# Patient Record
Sex: FEMALE | Race: WHITE | NOT HISPANIC OR LATINO | Employment: OTHER | URBAN - METROPOLITAN AREA
[De-identification: names, ages, dates, MRNs, and addresses within clinical notes are randomized per-mention and may not be internally consistent; named-entity substitution may affect disease eponyms.]

---

## 2017-06-07 ENCOUNTER — APPOINTMENT (EMERGENCY)
Dept: CT IMAGING | Facility: HOSPITAL | Age: 37
End: 2017-06-07
Payer: COMMERCIAL

## 2017-06-07 ENCOUNTER — HOSPITAL ENCOUNTER (EMERGENCY)
Facility: HOSPITAL | Age: 37
Discharge: HOME | End: 2017-06-07
Attending: EMERGENCY MEDICINE | Admitting: EMERGENCY MEDICINE
Payer: COMMERCIAL

## 2017-06-07 VITALS
DIASTOLIC BLOOD PRESSURE: 71 MMHG | HEART RATE: 55 BPM | SYSTOLIC BLOOD PRESSURE: 112 MMHG | TEMPERATURE: 97.8 F | WEIGHT: 143.08 LBS | RESPIRATION RATE: 20 BRPM | OXYGEN SATURATION: 100 %

## 2017-06-07 DIAGNOSIS — K52.9 GASTROENTERITIS: ICD-10-CM

## 2017-06-07 DIAGNOSIS — R10.9 NONSPECIFIC ABDOMINAL PAIN: Primary | ICD-10-CM

## 2017-06-07 LAB
ALBUMIN SERPL BCP-MCNC: 4 G/DL (ref 3.5–5)
ALP SERPL-CCNC: 74 U/L (ref 46–116)
ALT SERPL W P-5'-P-CCNC: 15 U/L (ref 12–78)
ANION GAP SERPL CALCULATED.3IONS-SCNC: 7 MMOL/L (ref 4–13)
AST SERPL W P-5'-P-CCNC: 16 U/L (ref 5–45)
BASOPHILS # BLD AUTO: 0.02 THOUSANDS/ΜL (ref 0–0.1)
BASOPHILS NFR BLD AUTO: 0 % (ref 0–1)
BILIRUB DIRECT SERPL-MCNC: 0.08 MG/DL (ref 0–0.2)
BILIRUB SERPL-MCNC: 0.4 MG/DL (ref 0.2–1)
BILIRUB UR QL STRIP: NEGATIVE
BUN SERPL-MCNC: 10 MG/DL (ref 5–25)
CALCIUM SERPL-MCNC: 8.8 MG/DL (ref 8.3–10.1)
CHLORIDE SERPL-SCNC: 106 MMOL/L (ref 100–108)
CLARITY UR: CLEAR
CO2 SERPL-SCNC: 28 MMOL/L (ref 21–32)
COLOR UR: YELLOW
CREAT SERPL-MCNC: 0.66 MG/DL (ref 0.6–1.3)
EOSINOPHIL # BLD AUTO: 0.05 THOUSAND/ΜL (ref 0–0.61)
EOSINOPHIL NFR BLD AUTO: 1 % (ref 0–6)
ERYTHROCYTE [DISTWIDTH] IN BLOOD BY AUTOMATED COUNT: 12.8 % (ref 11.6–15.1)
GFR SERPL CREATININE-BSD FRML MDRD: >60 ML/MIN/1.73SQ M
GLUCOSE SERPL-MCNC: 99 MG/DL (ref 65–140)
GLUCOSE UR STRIP-MCNC: NEGATIVE MG/DL
HCT VFR BLD AUTO: 41.3 % (ref 34.8–46.1)
HGB BLD-MCNC: 13.9 G/DL (ref 11.5–15.4)
HGB UR QL STRIP.AUTO: NEGATIVE
KETONES UR STRIP-MCNC: NEGATIVE MG/DL
LEUKOCYTE ESTERASE UR QL STRIP: NEGATIVE
LIPASE SERPL-CCNC: 129 U/L (ref 73–393)
LYMPHOCYTES # BLD AUTO: 1.3 THOUSANDS/ΜL (ref 0.6–4.47)
LYMPHOCYTES NFR BLD AUTO: 14 % (ref 14–44)
MCH RBC QN AUTO: 30 PG (ref 26.8–34.3)
MCHC RBC AUTO-ENTMCNC: 33.7 G/DL (ref 31.4–37.4)
MCV RBC AUTO: 89 FL (ref 82–98)
MONOCYTES # BLD AUTO: 0.35 THOUSAND/ΜL (ref 0.17–1.22)
MONOCYTES NFR BLD AUTO: 4 % (ref 4–12)
NEUTROPHILS # BLD AUTO: 7.58 THOUSANDS/ΜL (ref 1.85–7.62)
NEUTS SEG NFR BLD AUTO: 81 % (ref 43–75)
NITRITE UR QL STRIP: NEGATIVE
PH UR STRIP.AUTO: 7.5 [PH] (ref 4.5–8)
PLATELET # BLD AUTO: 286 THOUSANDS/UL (ref 149–390)
PMV BLD AUTO: 9.1 FL (ref 8.9–12.7)
POTASSIUM SERPL-SCNC: 3.8 MMOL/L (ref 3.5–5.3)
PROT SERPL-MCNC: 7.5 G/DL (ref 6.4–8.2)
PROT UR STRIP-MCNC: NEGATIVE MG/DL
RBC # BLD AUTO: 4.63 MILLION/UL (ref 3.81–5.12)
SODIUM SERPL-SCNC: 141 MMOL/L (ref 136–145)
SP GR UR STRIP.AUTO: 1.01 (ref 1–1.03)
UROBILINOGEN UR QL STRIP.AUTO: 0.2 E.U./DL
WBC # BLD AUTO: 9.3 THOUSAND/UL (ref 4.31–10.16)

## 2017-06-07 PROCEDURE — 96375 TX/PRO/DX INJ NEW DRUG ADDON: CPT

## 2017-06-07 PROCEDURE — 99284 EMERGENCY DEPT VISIT MOD MDM: CPT

## 2017-06-07 PROCEDURE — 80076 HEPATIC FUNCTION PANEL: CPT | Performed by: PHYSICIAN ASSISTANT

## 2017-06-07 PROCEDURE — 96361 HYDRATE IV INFUSION ADD-ON: CPT

## 2017-06-07 PROCEDURE — 83690 ASSAY OF LIPASE: CPT | Performed by: PHYSICIAN ASSISTANT

## 2017-06-07 PROCEDURE — 36415 COLL VENOUS BLD VENIPUNCTURE: CPT | Performed by: PHYSICIAN ASSISTANT

## 2017-06-07 PROCEDURE — 80048 BASIC METABOLIC PNL TOTAL CA: CPT | Performed by: PHYSICIAN ASSISTANT

## 2017-06-07 PROCEDURE — A9270 NON-COVERED ITEM OR SERVICE: HCPCS | Performed by: PHYSICIAN ASSISTANT

## 2017-06-07 PROCEDURE — 96374 THER/PROPH/DIAG INJ IV PUSH: CPT

## 2017-06-07 PROCEDURE — 74177 CT ABD & PELVIS W/CONTRAST: CPT

## 2017-06-07 PROCEDURE — 85025 COMPLETE CBC W/AUTO DIFF WBC: CPT | Performed by: PHYSICIAN ASSISTANT

## 2017-06-07 PROCEDURE — 81003 URINALYSIS AUTO W/O SCOPE: CPT | Performed by: PHYSICIAN ASSISTANT

## 2017-06-07 RX ORDER — DICYCLOMINE HCL 20 MG
20 TABLET ORAL EVERY 6 HOURS
Qty: 20 TABLET | Refills: 0 | Status: SHIPPED | OUTPATIENT
Start: 2017-06-07 | End: 2018-08-07 | Stop reason: ALTCHOICE

## 2017-06-07 RX ORDER — DICYCLOMINE HCL 20 MG
20 TABLET ORAL ONCE
Status: COMPLETED | OUTPATIENT
Start: 2017-06-07 | End: 2017-06-07

## 2017-06-07 RX ORDER — MORPHINE SULFATE 2 MG/ML
2 INJECTION, SOLUTION INTRAMUSCULAR; INTRAVENOUS ONCE
Status: COMPLETED | OUTPATIENT
Start: 2017-06-07 | End: 2017-06-07

## 2017-06-07 RX ORDER — ONDANSETRON 2 MG/ML
4 INJECTION INTRAMUSCULAR; INTRAVENOUS ONCE
Status: COMPLETED | OUTPATIENT
Start: 2017-06-07 | End: 2017-06-07

## 2017-06-07 RX ORDER — ONDANSETRON 4 MG/1
4 TABLET, ORALLY DISINTEGRATING ORAL EVERY 8 HOURS PRN
Qty: 15 TABLET | Refills: 0 | Status: SHIPPED | OUTPATIENT
Start: 2017-06-07 | End: 2018-07-13 | Stop reason: ALTCHOICE

## 2017-06-07 RX ORDER — SUCRALFATE ORAL 1 G/10ML
1000 SUSPENSION ORAL ONCE
Status: COMPLETED | OUTPATIENT
Start: 2017-06-07 | End: 2017-06-07

## 2017-06-07 RX ADMIN — SUCRALFATE 1000 MG: 1 SUSPENSION ORAL at 12:53

## 2017-06-07 RX ADMIN — ONDANSETRON 4 MG: 2 INJECTION INTRAMUSCULAR; INTRAVENOUS at 12:24

## 2017-06-07 RX ADMIN — MORPHINE SULFATE 2 MG: 2 INJECTION, SOLUTION INTRAMUSCULAR; INTRAVENOUS at 14:03

## 2017-06-07 RX ADMIN — IOHEXOL 100 ML: 350 INJECTION, SOLUTION INTRAVENOUS at 13:05

## 2017-06-07 RX ADMIN — LIDOCAINE HYDROCHLORIDE 15 ML: 20 SOLUTION ORAL; TOPICAL at 12:54

## 2017-06-07 RX ADMIN — SODIUM CHLORIDE 1000 ML: 0.9 INJECTION, SOLUTION INTRAVENOUS at 12:17

## 2017-06-07 RX ADMIN — DICYCLOMINE HYDROCHLORIDE 20 MG: 20 TABLET ORAL at 12:24

## 2018-01-24 DIAGNOSIS — F41.9 ANXIETY: Primary | ICD-10-CM

## 2018-01-24 RX ORDER — ALBUTEROL SULFATE 90 UG/1
AEROSOL, METERED RESPIRATORY (INHALATION)
COMMUNITY
Start: 2014-04-07 | End: 2019-08-27 | Stop reason: ALTCHOICE

## 2018-01-24 RX ORDER — CITALOPRAM 40 MG/1
TABLET ORAL
COMMUNITY
Start: 2017-03-05 | End: 2018-08-01 | Stop reason: ALTCHOICE

## 2018-01-24 RX ORDER — ALPRAZOLAM 0.5 MG/1
TABLET ORAL
COMMUNITY
Start: 2017-05-08 | End: 2018-01-24 | Stop reason: SDUPTHER

## 2018-01-24 RX ORDER — MELOXICAM 15 MG/1
TABLET ORAL
COMMUNITY
Start: 2015-07-16 | End: 2018-07-13 | Stop reason: ALTCHOICE

## 2018-01-24 RX ORDER — CYCLOBENZAPRINE HCL 10 MG
TABLET ORAL
COMMUNITY
Start: 2017-04-24 | End: 2018-04-02 | Stop reason: SDUPTHER

## 2018-01-24 RX ORDER — MULTIVITAMINS WITH FLUORIDE 0.25 MG
TABLET,CHEWABLE ORAL
COMMUNITY
End: 2018-12-10 | Stop reason: ALTCHOICE

## 2018-01-25 RX ORDER — ALPRAZOLAM 0.5 MG/1
0.5 TABLET ORAL 4 TIMES DAILY PRN
Qty: 120 TABLET | Refills: 1 | OUTPATIENT
Start: 2018-01-25 | End: 2018-02-22 | Stop reason: SDUPTHER

## 2018-02-19 ENCOUNTER — TELEPHONE (OUTPATIENT)
Dept: FAMILY MEDICINE CLINIC | Facility: CLINIC | Age: 38
End: 2018-02-19

## 2018-02-19 NOTE — TELEPHONE ENCOUNTER
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

## 2018-02-19 NOTE — TELEPHONE ENCOUNTER
1) No insurance till march 1 can't afford appt     Sinus pressure and headaches  sudafed + 1 wk works put only last 4 hours     Zyrtec didn't help  marva and allegra D make her heart race  PLEASE GIVE ADVISE  Can she continue taking sudafed daily? And every 4 hours? Pt made appt for 3/6/18 when her ins is effective  2) pt also needs refill on xanax   5mg 4 x daily  Sent to [address]  Pt is seeing thereapist and once she starts ins they are going to try and ween of meds and try something else

## 2018-02-22 DIAGNOSIS — F41.9 ANXIETY: ICD-10-CM

## 2018-02-22 RX ORDER — ALPRAZOLAM 0.5 MG/1
0.5 TABLET ORAL 4 TIMES DAILY PRN
Qty: 120 TABLET | Refills: 0 | OUTPATIENT
Start: 2018-02-22 | End: 2018-03-16 | Stop reason: SDUPTHER

## 2018-03-08 ENCOUNTER — TELEPHONE (OUTPATIENT)
Dept: FAMILY MEDICINE CLINIC | Facility: CLINIC | Age: 38
End: 2018-03-08

## 2018-03-08 NOTE — TELEPHONE ENCOUNTER
SHE DOES NOT HAVE INSURANCE RIGHT NOW AND SHE THINKS SHE BROKE HER LAST TOE  HER NURSE FRIEND TOLD HER TO TAPE IT AND WEAR A BOOT SHE GOT FOR HER       IS THERE ANYTHING ELSE SHE CAN REALLY DO FOR IT?     PLEASE ADVISE

## 2018-03-16 DIAGNOSIS — F41.9 ANXIETY: ICD-10-CM

## 2018-03-16 RX ORDER — ALPRAZOLAM 0.5 MG/1
0.5 TABLET ORAL 4 TIMES DAILY PRN
Qty: 120 TABLET | Refills: 0 | OUTPATIENT
Start: 2018-03-16 | End: 2018-04-03 | Stop reason: SDUPTHER

## 2018-04-02 DIAGNOSIS — M62.838 MUSCLE SPASMS OF NECK: Primary | ICD-10-CM

## 2018-04-03 DIAGNOSIS — F41.9 ANXIETY: ICD-10-CM

## 2018-04-03 RX ORDER — ALPRAZOLAM 0.5 MG/1
0.5 TABLET ORAL 4 TIMES DAILY PRN
Qty: 120 TABLET | Refills: 0 | OUTPATIENT
Start: 2018-04-03 | End: 2018-05-11 | Stop reason: SDUPTHER

## 2018-04-03 RX ORDER — CYCLOBENZAPRINE HCL 10 MG
10 TABLET ORAL
Qty: 30 TABLET | Refills: 0 | Status: SHIPPED | OUTPATIENT
Start: 2018-04-03 | End: 2018-04-29 | Stop reason: SDUPTHER

## 2018-04-17 DIAGNOSIS — Z30.41 ORAL CONTRACEPTIVE USE: Primary | ICD-10-CM

## 2018-04-18 ENCOUNTER — TELEPHONE (OUTPATIENT)
Dept: FAMILY MEDICINE CLINIC | Facility: CLINIC | Age: 38
End: 2018-04-18

## 2018-04-18 NOTE — TELEPHONE ENCOUNTER
Patient called stating she received a call from [employer] stating the medication Menest is coming off the market  Patient is wondering if she should start to slowly come off of this medication since she has been on it for 12 years now  Patient states she will come in for an appointment if she needs to but she is still self pay     Patient can be reached at

## 2018-04-19 DIAGNOSIS — Z79.890 HORMONE REPLACEMENT THERAPY: Primary | ICD-10-CM

## 2018-04-19 NOTE — TELEPHONE ENCOUNTER
Pt would like premarin sent to pharmacy, can pt just start this medication or does she need to do any type of taper from [facility]

## 2018-04-27 ENCOUNTER — TELEPHONE (OUTPATIENT)
Dept: FAMILY MEDICINE CLINIC | Facility: CLINIC | Age: 38
End: 2018-04-27

## 2018-04-29 DIAGNOSIS — M62.838 MUSCLE SPASMS OF NECK: ICD-10-CM

## 2018-04-29 RX ORDER — CYCLOBENZAPRINE HCL 10 MG
10 TABLET ORAL
Qty: 30 TABLET | Refills: 0 | Status: SHIPPED | OUTPATIENT
Start: 2018-04-29 | End: 2018-05-27 | Stop reason: SDUPTHER

## 2018-05-11 DIAGNOSIS — F41.9 ANXIETY: ICD-10-CM

## 2018-05-11 RX ORDER — ALPRAZOLAM 0.5 MG/1
0.5 TABLET ORAL 4 TIMES DAILY PRN
Qty: 120 TABLET | Refills: 0 | Status: SHIPPED | OUTPATIENT
Start: 2018-05-11 | End: 2018-06-06 | Stop reason: SDUPTHER

## 2018-05-27 DIAGNOSIS — M62.838 MUSCLE SPASMS OF NECK: ICD-10-CM

## 2018-05-29 RX ORDER — CYCLOBENZAPRINE HCL 10 MG
10 TABLET ORAL
Qty: 30 TABLET | Refills: 0 | Status: SHIPPED | OUTPATIENT
Start: 2018-05-29 | End: 2018-07-18 | Stop reason: SDUPTHER

## 2018-06-06 DIAGNOSIS — F41.9 ANXIETY: ICD-10-CM

## 2018-06-06 RX ORDER — ALPRAZOLAM 0.5 MG/1
0.5 TABLET ORAL 4 TIMES DAILY PRN
Qty: 120 TABLET | Refills: 0 | Status: SHIPPED | OUTPATIENT
Start: 2018-06-06 | End: 2018-07-13 | Stop reason: SDUPTHER

## 2018-07-13 ENCOUNTER — OFFICE VISIT (OUTPATIENT)
Dept: FAMILY MEDICINE CLINIC | Facility: CLINIC | Age: 38
End: 2018-07-13
Payer: COMMERCIAL

## 2018-07-13 ENCOUNTER — TELEPHONE (OUTPATIENT)
Dept: FAMILY MEDICINE CLINIC | Facility: CLINIC | Age: 38
End: 2018-07-13

## 2018-07-13 ENCOUNTER — HOSPITAL ENCOUNTER (OUTPATIENT)
Dept: ULTRASOUND IMAGING | Facility: HOSPITAL | Age: 38
Discharge: HOME | End: 2018-07-13

## 2018-07-13 VITALS
DIASTOLIC BLOOD PRESSURE: 72 MMHG | WEIGHT: 147 LBS | SYSTOLIC BLOOD PRESSURE: 114 MMHG | HEIGHT: 61 IN | BODY MASS INDEX: 27.75 KG/M2 | HEART RATE: 60 BPM | RESPIRATION RATE: 14 BRPM | TEMPERATURE: 97.6 F

## 2018-07-13 DIAGNOSIS — F41.9 ANXIETY: ICD-10-CM

## 2018-07-13 DIAGNOSIS — T63.301A ALLERGIC REACTION TO SPIDER BITE, ACCIDENTAL OR UNINTENTIONAL, INITIAL ENCOUNTER: ICD-10-CM

## 2018-07-13 DIAGNOSIS — M79.661 PAIN IN RIGHT LOWER LEG: Primary | ICD-10-CM

## 2018-07-13 DIAGNOSIS — M79.661 PAIN IN RIGHT LOWER LEG: ICD-10-CM

## 2018-07-13 PROCEDURE — 93971 EXTREMITY STUDY: CPT

## 2018-07-13 PROCEDURE — 99214 OFFICE O/P EST MOD 30 MIN: CPT | Performed by: NURSE PRACTITIONER

## 2018-07-13 RX ORDER — SULFAMETHOXAZOLE AND TRIMETHOPRIM 800; 160 MG/1; MG/1
1 TABLET ORAL EVERY 12 HOURS SCHEDULED
COMMUNITY
End: 2018-08-01 | Stop reason: ALTCHOICE

## 2018-07-13 RX ORDER — ALPRAZOLAM 0.5 MG/1
0.5 TABLET ORAL 4 TIMES DAILY PRN
Qty: 120 TABLET | Refills: 0 | Status: SHIPPED | OUTPATIENT
Start: 2018-07-13 | End: 2018-08-13 | Stop reason: SDUPTHER

## 2018-07-13 RX ORDER — CEPHALEXIN 250 MG/1
500 CAPSULE ORAL 4 TIMES DAILY
COMMUNITY
End: 2018-08-01 | Stop reason: ALTCHOICE
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
## 2018-07-14 PROCEDURE — 93971 EXTREMITY STUDY: CPT | Performed by: SURGERY

## 2018-07-18 DIAGNOSIS — M62.838 MUSCLE SPASMS OF NECK: ICD-10-CM

## 2018-07-18 RX ORDER — CITALOPRAM 40 MG/1
40 TABLET ORAL DAILY
Qty: 90 TABLET | Refills: 0 | Status: SHIPPED | OUTPATIENT
Start: 2018-07-18 | End: 2018-10-13 | Stop reason: SDUPTHER

## 2018-07-18 RX ORDER — CYCLOBENZAPRINE HCL 10 MG
10 TABLET ORAL
Qty: 90 TABLET | Refills: 0 | Status: SHIPPED | OUTPATIENT
Start: 2018-07-18 | End: 2019-08-27 | Stop reason: ALTCHOICE

## 2018-08-01 ENCOUNTER — HOSPITAL ENCOUNTER (INPATIENT)
Facility: HOSPITAL | Age: 38
LOS: 1 days | Discharge: HOME | DRG: 868 | End: 2018-08-03
Attending: EMERGENCY MEDICINE | Admitting: GENERAL PRACTICE
Payer: COMMERCIAL

## 2018-08-01 ENCOUNTER — TELEPHONE (OUTPATIENT)
Dept: FAMILY MEDICINE CLINIC | Facility: CLINIC | Age: 38
End: 2018-08-01

## 2018-08-01 ENCOUNTER — OFFICE VISIT (OUTPATIENT)
Dept: FAMILY MEDICINE CLINIC | Facility: CLINIC | Age: 38
End: 2018-08-01
Payer: COMMERCIAL

## 2018-08-01 VITALS
WEIGHT: 154.6 LBS | SYSTOLIC BLOOD PRESSURE: 114 MMHG | TEMPERATURE: 98.2 F | RESPIRATION RATE: 14 BRPM | HEIGHT: 61 IN | HEART RATE: 68 BPM | BODY MASS INDEX: 29.19 KG/M2 | DIASTOLIC BLOOD PRESSURE: 64 MMHG

## 2018-08-01 DIAGNOSIS — L53.8 MACULAR ERYTHEMATOUS RASH: Primary | ICD-10-CM

## 2018-08-01 DIAGNOSIS — L03.90 CELLULITIS: Primary | ICD-10-CM

## 2018-08-01 DIAGNOSIS — F41.9 ANXIETY: ICD-10-CM

## 2018-08-01 DIAGNOSIS — R53.81 MALAISE AND FATIGUE: ICD-10-CM
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
## 2018-08-01 DIAGNOSIS — K58.9 IBS (IRRITABLE BOWEL SYNDROME): ICD-10-CM

## 2018-08-01 DIAGNOSIS — R53.83 MALAISE AND FATIGUE: ICD-10-CM

## 2018-08-01 DIAGNOSIS — A69.20 LYME DISEASE: ICD-10-CM

## 2018-08-01 PROBLEM — S70.362A: Status: ACTIVE | Noted: 2018-08-01

## 2018-08-01 PROBLEM — L08.9: Status: ACTIVE | Noted: 2018-08-01

## 2018-08-01 LAB
ALBUMIN SERPL BCP-MCNC: 3.8 G/DL (ref 3.5–5)
ALP SERPL-CCNC: 102 U/L (ref 46–116)
ALT SERPL W P-5'-P-CCNC: 35 U/L (ref 12–78)
ANION GAP SERPL CALCULATED.3IONS-SCNC: 4 MMOL/L (ref 4–13)
AST SERPL W P-5'-P-CCNC: 14 U/L (ref 5–45)
BASOPHILS # BLD AUTO: 0.04 THOUSANDS/ΜL (ref 0–0.1)
BASOPHILS NFR BLD AUTO: 1 % (ref 0–1)
BILIRUB SERPL-MCNC: 0.28 MG/DL (ref 0.2–1)
BILIRUB UR QL STRIP: NEGATIVE
BUN SERPL-MCNC: 10 MG/DL (ref 5–25)
CALCIUM SERPL-MCNC: 9.2 MG/DL (ref 8.3–10.1)
CHLORIDE SERPL-SCNC: 104 MMOL/L (ref 100–108)
CLARITY UR: CLEAR
CO2 SERPL-SCNC: 31 MMOL/L (ref 21–32)
COLOR UR: YELLOW
CREAT SERPL-MCNC: 0.74 MG/DL (ref 0.6–1.3)
EOSINOPHIL # BLD AUTO: 0.12 THOUSAND/ΜL (ref 0–0.61)
EOSINOPHIL NFR BLD AUTO: 2 % (ref 0–6)
ERYTHROCYTE [DISTWIDTH] IN BLOOD BY AUTOMATED COUNT: 13.2 % (ref 11.6–15.1)
EXT PREG TEST URINE: NORMAL
GFR SERPL CREATININE-BSD FRML MDRD: 105 ML/MIN/1.73SQ M
GLUCOSE SERPL-MCNC: 84 MG/DL (ref 65–140)
GLUCOSE UR STRIP-MCNC: NEGATIVE MG/DL
HCT VFR BLD AUTO: 39.7 % (ref 34.8–46.1)
HGB BLD-MCNC: 12.6 G/DL (ref 11.5–15.4)
HGB UR QL STRIP.AUTO: NEGATIVE
IMM GRANULOCYTES # BLD AUTO: 0.03 THOUSAND/UL (ref 0–0.2)
IMM GRANULOCYTES NFR BLD AUTO: 0 % (ref 0–2)
KETONES UR STRIP-MCNC: NEGATIVE MG/DL
LEUKOCYTE ESTERASE UR QL STRIP: NEGATIVE
LIPASE SERPL-CCNC: 188 U/L (ref 73–393)
LYMPHOCYTES # BLD AUTO: 1.59 THOUSANDS/ΜL (ref 0.6–4.47)
LYMPHOCYTES NFR BLD AUTO: 23 % (ref 14–44)
MCH RBC QN AUTO: 29.2 PG (ref 26.8–34.3)
MCHC RBC AUTO-ENTMCNC: 31.7 G/DL (ref 31.4–37.4)
MCV RBC AUTO: 92 FL (ref 82–98)
MONOCYTES # BLD AUTO: 0.55 THOUSAND/ΜL (ref 0.17–1.22)
MONOCYTES NFR BLD AUTO: 8 % (ref 4–12)
NEUTROPHILS # BLD AUTO: 4.56 THOUSANDS/ΜL (ref 1.85–7.62)
NEUTS SEG NFR BLD AUTO: 66 % (ref 43–75)
NITRITE UR QL STRIP: NEGATIVE
NRBC BLD AUTO-RTO: 0 /100 WBCS
PH UR STRIP.AUTO: 7.5 [PH] (ref 4.5–8)
PLATELET # BLD AUTO: 279 THOUSANDS/UL (ref 149–390)
PMV BLD AUTO: 9.4 FL (ref 8.9–12.7)
POTASSIUM SERPL-SCNC: 3.6 MMOL/L (ref 3.5–5.3)
PROT SERPL-MCNC: 7.4 G/DL (ref 6.4–8.2)
PROT UR STRIP-MCNC: NEGATIVE MG/DL
RBC # BLD AUTO: 4.31 MILLION/UL (ref 3.81–5.12)
SODIUM SERPL-SCNC: 139 MMOL/L (ref 136–145)
SP GR UR STRIP.AUTO: 1.01 (ref 1–1.03)
UROBILINOGEN UR QL STRIP.AUTO: 0.2 E.U./DL
WBC # BLD AUTO: 6.89 THOUSAND/UL (ref 4.31–10.16)

## 2018-08-01 PROCEDURE — 96365 THER/PROPH/DIAG IV INF INIT: CPT

## 2018-08-01 PROCEDURE — 86618 LYME DISEASE ANTIBODY: CPT | Performed by: INTERNAL MEDICINE

## 2018-08-01 PROCEDURE — 80053 COMPREHEN METABOLIC PANEL: CPT | Performed by: EMERGENCY MEDICINE

## 2018-08-01 PROCEDURE — 86617 LYME DISEASE ANTIBODY: CPT | Performed by: INTERNAL MEDICINE

## 2018-08-01 PROCEDURE — 87081 CULTURE SCREEN ONLY: CPT | Performed by: INTERNAL MEDICINE

## 2018-08-01 PROCEDURE — 99220 PR INITIAL OBSERVATION CARE/DAY 70 MINUTES: CPT | Performed by: INTERNAL MEDICINE

## 2018-08-01 PROCEDURE — 93005 ELECTROCARDIOGRAM TRACING: CPT

## 2018-08-01 PROCEDURE — 81003 URINALYSIS AUTO W/O SCOPE: CPT

## 2018-08-01 PROCEDURE — 36415 COLL VENOUS BLD VENIPUNCTURE: CPT | Performed by: EMERGENCY MEDICINE

## 2018-08-01 PROCEDURE — 85025 COMPLETE CBC W/AUTO DIFF WBC: CPT | Performed by: EMERGENCY MEDICINE

## 2018-08-01 PROCEDURE — 99284 EMERGENCY DEPT VISIT MOD MDM: CPT

## 2018-08-01 PROCEDURE — 99212 OFFICE O/P EST SF 10 MIN: CPT | Performed by: FAMILY MEDICINE

## 2018-08-01 PROCEDURE — 83690 ASSAY OF LIPASE: CPT | Performed by: EMERGENCY MEDICINE

## 2018-08-01 PROCEDURE — 1111F DSCHRG MED/CURRENT MED MERGE: CPT | Performed by: FAMILY MEDICINE

## 2018-08-01 PROCEDURE — 81025 URINE PREGNANCY TEST: CPT | Performed by: EMERGENCY MEDICINE

## 2018-08-01 RX ORDER — ALBUTEROL SULFATE 90 UG/1
2 AEROSOL, METERED RESPIRATORY (INHALATION) EVERY 6 HOURS PRN
Status: DISCONTINUED | OUTPATIENT
Start: 2018-08-01 | End: 2018-08-03 | Stop reason: HOSPADM

## 2018-08-01 RX ORDER — MAGNESIUM HYDROXIDE/ALUMINUM HYDROXICE/SIMETHICONE 120; 1200; 1200 MG/30ML; MG/30ML; MG/30ML
15 SUSPENSION ORAL EVERY 6 HOURS PRN
Status: DISCONTINUED | OUTPATIENT
Start: 2018-08-01 | End: 2018-08-02

## 2018-08-01 RX ORDER — CLINDAMYCIN PHOSPHATE 600 MG/50ML
600 INJECTION INTRAVENOUS EVERY 8 HOURS
Status: DISCONTINUED | OUTPATIENT
Start: 2018-08-01 | End: 2018-08-02

## 2018-08-01 RX ORDER — CYCLOBENZAPRINE HCL 10 MG
10 TABLET ORAL
Status: DISCONTINUED | OUTPATIENT
Start: 2018-08-01 | End: 2018-08-03 | Stop reason: HOSPADM

## 2018-08-01 RX ORDER — CITALOPRAM 20 MG/1
40 TABLET ORAL DAILY
Status: DISCONTINUED | OUTPATIENT
Start: 2018-08-02 | End: 2018-08-03 | Stop reason: HOSPADM

## 2018-08-01 RX ORDER — DICYCLOMINE HCL 20 MG
20 TABLET ORAL EVERY 6 HOURS
Status: DISCONTINUED | OUTPATIENT
Start: 2018-08-01 | End: 2018-08-03 | Stop reason: HOSPADM

## 2018-08-01 RX ORDER — ONDANSETRON 2 MG/ML
4 INJECTION INTRAMUSCULAR; INTRAVENOUS EVERY 6 HOURS PRN
Status: DISCONTINUED | OUTPATIENT
Start: 2018-08-01 | End: 2018-08-03 | Stop reason: HOSPADM

## 2018-08-01 RX ORDER — DOCUSATE SODIUM 100 MG/1
100 CAPSULE, LIQUID FILLED ORAL 2 TIMES DAILY PRN
Status: DISCONTINUED | OUTPATIENT
Start: 2018-08-01 | End: 2018-08-03 | Stop reason: HOSPADM

## 2018-08-01 RX ORDER — ACETAMINOPHEN 325 MG/1
650 TABLET ORAL EVERY 6 HOURS PRN
Status: DISCONTINUED | OUTPATIENT
Start: 2018-08-01 | End: 2018-08-02

## 2018-08-01 RX ORDER — ALPRAZOLAM 0.5 MG/1
0.5 TABLET ORAL 4 TIMES DAILY PRN
Status: DISCONTINUED | OUTPATIENT
Start: 2018-08-01 | End: 2018-08-03 | Stop reason: HOSPADM

## 2018-08-01 RX ORDER — CLINDAMYCIN PHOSPHATE 600 MG/50ML
600 INJECTION INTRAVENOUS ONCE
Status: COMPLETED | OUTPATIENT
Start: 2018-08-01 | End: 2018-08-02

## 2018-08-01 RX ADMIN — CYCLOBENZAPRINE HYDROCHLORIDE 10 MG: 10 TABLET, FILM COATED ORAL at 22:35

## 2018-08-01 RX ADMIN — ALPRAZOLAM 0.5 MG: 0.5 TABLET ORAL at 22:35

## 2018-08-01 RX ADMIN — ACETAMINOPHEN 650 MG: 325 TABLET, FILM COATED ORAL at 22:41

## 2018-08-01 RX ADMIN — CLINDAMYCIN PHOSPHATE 600 MG: 12 INJECTION, SOLUTION INTRAMUSCULAR; INTRAVENOUS at 20:37

## 2018-08-02 PROBLEM — R53.81 MALAISE AND FATIGUE: Status: ACTIVE | Noted: 2018-08-02

## 2018-08-02 PROBLEM — R53.83 MALAISE AND FATIGUE: Status: ACTIVE | Noted: 2018-08-02

## 2018-08-02 LAB
ANION GAP SERPL CALCULATED.3IONS-SCNC: 5 MMOL/L (ref 4–13)
ATRIAL RATE: 65 BPM
BUN SERPL-MCNC: 12 MG/DL (ref 5–25)
CALCIUM SERPL-MCNC: 8.7 MG/DL (ref 8.3–10.1)
CHLORIDE SERPL-SCNC: 105 MMOL/L (ref 100–108)
CO2 SERPL-SCNC: 29 MMOL/L (ref 21–32)
CREAT SERPL-MCNC: 0.67 MG/DL (ref 0.6–1.3)
CRP SERPL QL: 12 MG/L
ERYTHROCYTE [DISTWIDTH] IN BLOOD BY AUTOMATED COUNT: 13.2 % (ref 11.6–15.1)
ERYTHROCYTE [SEDIMENTATION RATE] IN BLOOD: 21 MM/HOUR (ref 0–20)
GFR SERPL CREATININE-BSD FRML MDRD: 113 ML/MIN/1.73SQ M
GLUCOSE SERPL-MCNC: 98 MG/DL (ref 65–140)
HCT VFR BLD AUTO: 38 % (ref 34.8–46.1)
HGB BLD-MCNC: 12.4 G/DL (ref 11.5–15.4)
MAGNESIUM SERPL-MCNC: 2.3 MG/DL (ref 1.6–2.6)
MCH RBC QN AUTO: 29.9 PG (ref 26.8–34.3)
MCHC RBC AUTO-ENTMCNC: 32.6 G/DL (ref 31.4–37.4)
MCV RBC AUTO: 92 FL (ref 82–98)
P AXIS: 56 DEGREES
PHOSPHATE SERPL-MCNC: 3.5 MG/DL (ref 2.7–4.5)
PLATELET # BLD AUTO: 247 THOUSANDS/UL (ref 149–390)
PMV BLD AUTO: 9.2 FL (ref 8.9–12.7)
POTASSIUM SERPL-SCNC: 3.5 MMOL/L (ref 3.5–5.3)
PR INTERVAL: 136 MS
QRS AXIS: 36 DEGREES
QRSD INTERVAL: 74 MS
QT INTERVAL: 392 MS
QTC INTERVAL: 407 MS
RBC # BLD AUTO: 4.15 MILLION/UL (ref 3.81–5.12)
SODIUM SERPL-SCNC: 139 MMOL/L (ref 136–145)
T WAVE AXIS: 41 DEGREES
VENTRICULAR RATE: 65 BPM
WBC # BLD AUTO: 7 THOUSAND/UL (ref 4.31–10.16)

## 2018-08-02 PROCEDURE — 86666 EHRLICHIA ANTIBODY: CPT | Performed by: PHYSICIAN ASSISTANT

## 2018-08-02 PROCEDURE — 84100 ASSAY OF PHOSPHORUS: CPT | Performed by: INTERNAL MEDICINE

## 2018-08-02 PROCEDURE — 80048 BASIC METABOLIC PNL TOTAL CA: CPT | Performed by: INTERNAL MEDICINE

## 2018-08-02 PROCEDURE — 87798 DETECT AGENT NOS DNA AMP: CPT | Performed by: PHYSICIAN ASSISTANT

## 2018-08-02 PROCEDURE — 93010 ELECTROCARDIOGRAM REPORT: CPT | Performed by: INTERNAL MEDICINE

## 2018-08-02 PROCEDURE — 99232 SBSQ HOSP IP/OBS MODERATE 35: CPT | Performed by: PHYSICIAN ASSISTANT

## 2018-08-02 PROCEDURE — 86140 C-REACTIVE PROTEIN: CPT | Performed by: PHYSICIAN ASSISTANT

## 2018-08-02 PROCEDURE — 85027 COMPLETE CBC AUTOMATED: CPT | Performed by: INTERNAL MEDICINE

## 2018-08-02 PROCEDURE — 85652 RBC SED RATE AUTOMATED: CPT | Performed by: INTERNAL MEDICINE

## 2018-08-02 PROCEDURE — 99254 IP/OBS CNSLTJ NEW/EST MOD 60: CPT | Performed by: INTERNAL MEDICINE

## 2018-08-02 PROCEDURE — 86753 PROTOZOA ANTIBODY NOS: CPT | Performed by: PHYSICIAN ASSISTANT

## 2018-08-02 PROCEDURE — 83735 ASSAY OF MAGNESIUM: CPT | Performed by: INTERNAL MEDICINE

## 2018-08-02 RX ORDER — ACETAMINOPHEN 325 MG/1
650 TABLET ORAL EVERY 6 HOURS PRN
Status: DISCONTINUED | OUTPATIENT
Start: 2018-08-02 | End: 2018-08-03 | Stop reason: HOSPADM

## 2018-08-02 RX ORDER — DOXYCYCLINE HYCLATE 100 MG/1
100 CAPSULE ORAL EVERY 12 HOURS SCHEDULED
Status: DISCONTINUED | OUTPATIENT
Start: 2018-08-02 | End: 2018-08-03 | Stop reason: HOSPADM

## 2018-08-02 RX ADMIN — CLINDAMYCIN PHOSPHATE 600 MG: 12 INJECTION, SOLUTION INTRAMUSCULAR; INTRAVENOUS at 03:33

## 2018-08-02 RX ADMIN — ALPRAZOLAM 0.5 MG: 0.5 TABLET ORAL at 23:30

## 2018-08-02 RX ADMIN — ACETAMINOPHEN 650 MG: 325 TABLET, FILM COATED ORAL at 07:38

## 2018-08-02 RX ADMIN — DICYCLOMINE HYDROCHLORIDE 20 MG: 20 TABLET ORAL at 03:33

## 2018-08-02 RX ADMIN — DICYCLOMINE HYDROCHLORIDE 20 MG: 20 TABLET ORAL at 11:19

## 2018-08-02 RX ADMIN — ACETAMINOPHEN 650 MG: 325 TABLET, FILM COATED ORAL at 23:25

## 2018-08-02 RX ADMIN — DICYCLOMINE HYDROCHLORIDE 20 MG: 20 TABLET ORAL at 15:08

## 2018-08-02 RX ADMIN — DOXYCYCLINE 100 MG: 100 CAPSULE ORAL at 10:05

## 2018-08-02 RX ADMIN — CYCLOBENZAPRINE HYDROCHLORIDE 10 MG: 10 TABLET, FILM COATED ORAL at 21:38

## 2018-08-02 RX ADMIN — ALPRAZOLAM 0.5 MG: 0.5 TABLET ORAL at 10:05

## 2018-08-02 RX ADMIN — ACETAMINOPHEN 650 MG: 325 TABLET, FILM COATED ORAL at 15:08

## 2018-08-02 RX ADMIN — ZINC 1 TABLET: TAB ORAL at 13:04

## 2018-08-02 RX ADMIN — DICYCLOMINE HYDROCHLORIDE 20 MG: 20 TABLET ORAL at 21:38

## 2018-08-02 RX ADMIN — CITALOPRAM HYDROBROMIDE 40 MG: 20 TABLET ORAL at 11:19

## 2018-08-02 RX ADMIN — ALPRAZOLAM 0.5 MG: 0.5 TABLET ORAL at 18:04

## 2018-08-02 RX ADMIN — DOXYCYCLINE 100 MG: 100 CAPSULE ORAL at 21:38
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
## 2018-08-03 VITALS
RESPIRATION RATE: 18 BRPM | TEMPERATURE: 98.1 F | HEIGHT: 61 IN | OXYGEN SATURATION: 96 % | WEIGHT: 151 LBS | SYSTOLIC BLOOD PRESSURE: 99 MMHG | DIASTOLIC BLOOD PRESSURE: 74 MMHG | BODY MASS INDEX: 28.51 KG/M2 | HEART RATE: 60 BPM

## 2018-08-03 LAB
B BURGDOR IGG SER IA-ACNC: 0.43
B BURGDOR IGM SER IA-ACNC: 14.27
MRSA NOSE QL CULT: NORMAL

## 2018-08-03 PROCEDURE — 99239 HOSP IP/OBS DSCHRG MGMT >30: CPT | Performed by: PHYSICIAN ASSISTANT

## 2018-08-03 RX ORDER — ALPRAZOLAM 0.5 MG/1
0.5 TABLET ORAL 3 TIMES DAILY PRN
Qty: 10 TABLET | Refills: 0 | Status: SHIPPED | OUTPATIENT
Start: 2018-08-03 | End: 2018-08-07 | Stop reason: ALTCHOICE

## 2018-08-03 RX ORDER — DOXYCYCLINE HYCLATE 100 MG/1
100 CAPSULE ORAL EVERY 12 HOURS SCHEDULED
Qty: 25 CAPSULE | Refills: 0 | Status: SHIPPED | OUTPATIENT
Start: 2018-08-03 | End: 2018-08-07 | Stop reason: SDUPTHER

## 2018-08-03 RX ORDER — DICYCLOMINE HCL 20 MG
20 TABLET ORAL EVERY 6 HOURS
Qty: 30 TABLET | Refills: 0 | Status: SHIPPED | OUTPATIENT
Start: 2018-08-03 | End: 2018-09-06 | Stop reason: SDUPTHER

## 2018-08-03 RX ORDER — IBUPROFEN 600 MG/1
600 TABLET ORAL EVERY 6 HOURS PRN
Qty: 12 TABLET | Refills: 0 | Status: SHIPPED | OUTPATIENT
Start: 2018-08-03 | End: 2018-12-10 | Stop reason: ALTCHOICE

## 2018-08-03 RX ADMIN — ACETAMINOPHEN 650 MG: 325 TABLET, FILM COATED ORAL at 08:04

## 2018-08-03 RX ADMIN — ZINC 1 TABLET: TAB ORAL at 08:04

## 2018-08-03 RX ADMIN — DOXYCYCLINE 100 MG: 100 CAPSULE ORAL at 08:04

## 2018-08-03 RX ADMIN — CITALOPRAM HYDROBROMIDE 40 MG: 20 TABLET ORAL at 08:04

## 2018-08-03 RX ADMIN — DICYCLOMINE HYDROCHLORIDE 20 MG: 20 TABLET ORAL at 03:41

## 2018-08-03 RX ADMIN — DICYCLOMINE HYDROCHLORIDE 20 MG: 20 TABLET ORAL at 09:38

## 2018-08-05 LAB
B BURGDOR IGG PATRN SER IB-IMP: NEGATIVE
B BURGDOR IGM PATRN SER IB-IMP: POSITIVE
B BURGDOR18KD IGG SER QL IB: ABNORMAL
B BURGDOR23KD IGG SER QL IB: ABNORMAL
B BURGDOR23KD IGM SER QL IB: PRESENT
B BURGDOR28KD IGG SER QL IB: ABNORMAL
B BURGDOR30KD IGG SER QL IB: ABNORMAL
B BURGDOR39KD IGG SER QL IB: ABNORMAL
B BURGDOR39KD IGM SER QL IB: ABNORMAL
B BURGDOR41KD IGG SER QL IB: PRESENT
B BURGDOR41KD IGM SER QL IB: PRESENT
B BURGDOR45KD IGG SER QL IB: ABNORMAL
B BURGDOR58KD IGG SER QL IB: ABNORMAL
B BURGDOR66KD IGG SER QL IB: ABNORMAL
B BURGDOR93KD IGG SER QL IB: ABNORMAL

## 2018-08-06 ENCOUNTER — TRANSITIONAL CARE MANAGEMENT (OUTPATIENT)
Dept: FAMILY MEDICINE CLINIC | Facility: CLINIC | Age: 38
End: 2018-08-06

## 2018-08-06 ENCOUNTER — TELEPHONE (OUTPATIENT)
Dept: FAMILY MEDICINE CLINIC | Facility: CLINIC | Age: 38
End: 2018-08-06

## 2018-08-06 LAB — A PHAGOCYTOPH DNA BLD QL NAA+PROBE: NEGATIVE

## 2018-08-06 NOTE — TELEPHONE ENCOUNTER
Left message on designated phone that MRSA culture negative and will discuss other lab results at appointment tomorrow

## 2018-08-06 NOTE — TELEPHONE ENCOUNTER
Patient had an appointment with Naida Romeo today for a hosp f/u  Patient is really worried about her MRSA lab results  Can we please call the patient back regarding her MRSA results? She did make an appointment with Rajinder Henderson because she truly didn't want to wait until next week to speak to a doctor   Patient can be reached at [phone]

## 2018-08-07 ENCOUNTER — OFFICE VISIT (OUTPATIENT)
Dept: FAMILY MEDICINE CLINIC | Facility: CLINIC | Age: 38
End: 2018-08-07
Payer: COMMERCIAL

## 2018-08-07 VITALS
HEART RATE: 64 BPM | SYSTOLIC BLOOD PRESSURE: 106 MMHG | WEIGHT: 152.6 LBS | TEMPERATURE: 98.5 F | HEIGHT: 61 IN | BODY MASS INDEX: 28.81 KG/M2 | RESPIRATION RATE: 16 BRPM | DIASTOLIC BLOOD PRESSURE: 76 MMHG

## 2018-08-07 DIAGNOSIS — A69.20 LYME DISEASE: ICD-10-CM

## 2018-08-07 DIAGNOSIS — M62.838 MUSCLE SPASMS OF NECK: ICD-10-CM

## 2018-08-07 LAB
B MICROTI IGG TITR SER: NORMAL {TITER}
B MICROTI IGM TITR SER: NORMAL {TITER}

## 2018-08-07 PROCEDURE — 99496 TRANSJ CARE MGMT HIGH F2F 7D: CPT | Performed by: NURSE PRACTITIONER

## 2018-08-07 RX ORDER — DOXYCYCLINE HYCLATE 100 MG/1
100 CAPSULE ORAL EVERY 12 HOURS SCHEDULED
Qty: 28 CAPSULE | Refills: 0 | Status: SHIPPED | OUTPATIENT
Start: 2018-08-07 | End: 2018-08-21

## 2018-08-07 RX ORDER — AMOXICILLIN 500 MG/1
500 TABLET, FILM COATED ORAL 3 TIMES DAILY
Qty: 84 TABLET | Refills: 0 | Status: CANCELLED | OUTPATIENT
Start: 2018-08-07 | End: 2018-09-04

## 2018-08-07 NOTE — LETTER
August 7, 2018     Patient: Estefani Sevilla   YOB: 1980   Date of Visit: 8/7/2018       To Whom it May Concern:    Grant Stout was seen in my clinic on 8/7/2018  She may return to work for half days starting on 08/16/18    If you have any questions or concerns, please don't hesitate to call           Sincerely,          REBECA Rodriguez        CC: No Recipients
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
## 2018-08-11 LAB
E CHAFFEENSIS IGG TITR SER IF: NORMAL {TITER}
E CHAFFEENSIS IGM TITR SER IF: NORMAL {TITER}

## 2018-08-13 DIAGNOSIS — F41.9 ANXIETY: ICD-10-CM

## 2018-08-13 RX ORDER — ALPRAZOLAM 0.5 MG/1
0.5 TABLET ORAL 4 TIMES DAILY PRN
Qty: 120 TABLET | Refills: 0 | Status: SHIPPED | OUTPATIENT
Start: 2018-08-13 | End: 2018-09-11 | Stop reason: SDUPTHER

## 2018-08-14 ENCOUNTER — OFFICE VISIT (OUTPATIENT)
Dept: FAMILY MEDICINE CLINIC | Facility: CLINIC | Age: 38
End: 2018-08-14
Payer: COMMERCIAL

## 2018-08-14 ENCOUNTER — TELEPHONE (OUTPATIENT)
Dept: FAMILY MEDICINE CLINIC | Facility: CLINIC | Age: 38
End: 2018-08-14

## 2018-08-14 VITALS
HEIGHT: 61 IN | WEIGHT: 152.8 LBS | DIASTOLIC BLOOD PRESSURE: 82 MMHG | HEART RATE: 80 BPM | SYSTOLIC BLOOD PRESSURE: 124 MMHG | BODY MASS INDEX: 28.85 KG/M2 | TEMPERATURE: 96.3 F

## 2018-08-14 DIAGNOSIS — J06.9 UPPER RESPIRATORY TRACT INFECTION, UNSPECIFIED TYPE: Primary | ICD-10-CM

## 2018-08-14 DIAGNOSIS — M25.50 ARTHRALGIA, UNSPECIFIED JOINT: ICD-10-CM

## 2018-08-14 DIAGNOSIS — R19.7 DIARRHEA, UNSPECIFIED TYPE: ICD-10-CM

## 2018-08-14 PROCEDURE — 99214 OFFICE O/P EST MOD 30 MIN: CPT | Performed by: FAMILY MEDICINE

## 2018-08-14 RX ORDER — DIPHENOXYLATE HYDROCHLORIDE AND ATROPINE SULFATE 2.5; .025 MG/1; MG/1
1 TABLET ORAL 4 TIMES DAILY PRN
Qty: 40 TABLET | Refills: 2 | Status: SHIPPED | OUTPATIENT
Start: 2018-08-14 | End: 2018-12-10 | Stop reason: ALTCHOICE

## 2018-08-14 RX ORDER — AZITHROMYCIN 250 MG/1
TABLET, FILM COATED ORAL
Qty: 6 TABLET | Refills: 0 | Status: SHIPPED | OUTPATIENT
Start: 2018-08-14 | End: 2018-08-18

## 2018-08-14 RX ORDER — MELOXICAM 15 MG/1
15 TABLET ORAL DAILY
Qty: 30 TABLET | Refills: 3 | Status: SHIPPED | OUTPATIENT
Start: 2018-08-14 | End: 2019-08-27 | Stop reason: ALTCHOICE

## 2018-08-14 NOTE — LETTER
August 14, 2018     Patient: Diana Ruiz   YOB: 1980   Date of Visit: 8/14/2018       To Whom it May Concern:    Yuko Castro is under my professional care  She was seen in my office on 8/14/2018  She may return to work on 8/20/18  She is currently being treated for active Lyme disease and experiencing complications due to this disease process  If you have any questions or concerns, please don't hesitate to call           Sincerely,          Kathleen Zurita DO        CC: No Recipients

## 2018-08-14 NOTE — TELEPHONE ENCOUNTER
She was dx with lymes, she still having weakness, sorethr, body tingles, is this part of the lymes or do you need to see again? Pl adv
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
## 2018-08-28 ENCOUNTER — OFFICE VISIT (OUTPATIENT)
Dept: FAMILY MEDICINE CLINIC | Facility: CLINIC | Age: 38
End: 2018-08-28
Payer: COMMERCIAL

## 2018-08-28 ENCOUNTER — TELEPHONE (OUTPATIENT)
Dept: FAMILY MEDICINE CLINIC | Facility: CLINIC | Age: 38
End: 2018-08-28

## 2018-08-28 VITALS
WEIGHT: 155.8 LBS | TEMPERATURE: 95.6 F | DIASTOLIC BLOOD PRESSURE: 80 MMHG | BODY MASS INDEX: 29.42 KG/M2 | SYSTOLIC BLOOD PRESSURE: 110 MMHG | HEART RATE: 64 BPM | HEIGHT: 61 IN

## 2018-08-28 DIAGNOSIS — R11.0 NAUSEA: Primary | ICD-10-CM

## 2018-08-28 DIAGNOSIS — H66.90 ACUTE OTITIS MEDIA, UNSPECIFIED OTITIS MEDIA TYPE: ICD-10-CM

## 2018-08-28 PROCEDURE — 3008F BODY MASS INDEX DOCD: CPT | Performed by: NURSE PRACTITIONER

## 2018-08-28 PROCEDURE — 99213 OFFICE O/P EST LOW 20 MIN: CPT | Performed by: NURSE PRACTITIONER

## 2018-08-28 RX ORDER — ONDANSETRON 4 MG/1
4 TABLET, FILM COATED ORAL EVERY 8 HOURS PRN
Qty: 30 TABLET | Refills: 0 | Status: SHIPPED | OUTPATIENT
Start: 2018-08-28 | End: 2018-12-10 | Stop reason: ALTCHOICE

## 2018-08-28 RX ORDER — AMOXICILLIN 875 MG/1
875 TABLET, COATED ORAL 2 TIMES DAILY
Qty: 20 TABLET | Refills: 0 | Status: SHIPPED | OUTPATIENT
Start: 2018-08-28 | End: 2018-09-07
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
## 2018-08-28 NOTE — LETTER
August 28, 2018     Patient: Ayaz Jack   YOB: 1980   Date of Visit: 8/28/2018       To Whom it May Concern:    Ngozi Haider was seen in my clinic on 8/28/2018  She may return to work on 08/30/19  If you have any questions or concerns, please don't hesitate to call           Sincerely,          REBECA Murcia        CC: No Recipients

## 2018-08-28 NOTE — TELEPHONE ENCOUNTER
Patient called stating she is still not feeling well  She really does not know what to do at this point  Patient states she still has bad pain in her stomach and is still getting pain in her legs when she walks  Patient stated she only has two doses left of the lyme antibiotic  Would you want to see the patient today or should she start a different medication? Patient will need a doctors excuse if you do not want to see her for today and yesterday  [facility]

## 2018-08-31 ENCOUNTER — TELEPHONE (OUTPATIENT)
Dept: FAMILY MEDICINE CLINIC | Facility: CLINIC | Age: 38
End: 2018-08-31

## 2018-08-31 DIAGNOSIS — H66.90 ACUTE OTITIS MEDIA, UNSPECIFIED OTITIS MEDIA TYPE: Primary | ICD-10-CM

## 2018-08-31 RX ORDER — PREDNISONE 10 MG/1
TABLET ORAL
Qty: 26 TABLET | Refills: 0 | Status: SHIPPED | OUTPATIENT
Start: 2018-08-31 | End: 2018-12-10 | Stop reason: ALTCHOICE

## 2018-08-31 RX ORDER — MECLIZINE HYDROCHLORIDE 25 MG/1
25 TABLET ORAL EVERY 8 HOURS PRN
Qty: 30 TABLET | Refills: 0 | Status: SHIPPED | OUTPATIENT
Start: 2018-08-31 | End: 2018-12-10 | Stop reason: ALTCHOICE

## 2018-08-31 NOTE — TELEPHONE ENCOUNTER
Let pt know I want her to continue with amoxil , and I called in pred and a pill for dizziness to go with antibiotic

## 2018-08-31 NOTE — TELEPHONE ENCOUNTER
Spoke with patient  Patient is aware of recommendations  States she will call if not feeling any better on Tuesday

## 2018-08-31 NOTE — TELEPHONE ENCOUNTER
Patient called stating she was seen this week with Yayo Jaquez and was told she had a double ear infection  Patient has been taking amoxicillin  Patient states she was seen on Tuesday, did not get out of bed on Wednesday, Thursday she tried getting up and going to work and was super dizzy  Today she is super dizzy still  She doesn't know if something else could be called in for her or do you have to see her again? Also patient states she can not remember anything at all  Her memory is really bad  Work excuse is needed       [facility]

## 2018-09-05 NOTE — TELEPHONE ENCOUNTER
Patient does not believe she will be able to make it today, her child is sick   She did schedule an appointment for tomorrow with you @ [phone]

## 2018-09-05 NOTE — TELEPHONE ENCOUNTER
Patient is calling back  , Patient states she is still not feeling well  She believes the prednisone is making her head feel like a hard pressure, she believes she is having major migranes  Patient is just upset with everything and doesn't know what to do  Should she come in to be seen?

## 2018-09-06 ENCOUNTER — TELEPHONE (OUTPATIENT)
Dept: FAMILY MEDICINE CLINIC | Facility: CLINIC | Age: 38
End: 2018-09-06

## 2018-09-06 DIAGNOSIS — K58.2 IRRITABLE BOWEL SYNDROME WITH BOTH CONSTIPATION AND DIARRHEA: ICD-10-CM

## 2018-09-06 DIAGNOSIS — A69.20 LYME DISEASE: Primary | ICD-10-CM

## 2018-09-06 RX ORDER — DOXYCYCLINE HYCLATE 100 MG/1
100 CAPSULE ORAL EVERY 12 HOURS SCHEDULED
Qty: 42 CAPSULE | Refills: 0 | Status: SHIPPED | OUTPATIENT
Start: 2018-09-06 | End: 2018-09-24 | Stop reason: SDUPTHER

## 2018-09-06 RX ORDER — DICYCLOMINE HCL 20 MG
20 TABLET ORAL EVERY 6 HOURS
Qty: 40 TABLET | Refills: 3 | Status: SHIPPED | OUTPATIENT
Start: 2018-09-06 | End: 2018-12-10 | Stop reason: ALTCHOICE

## 2018-09-06 NOTE — TELEPHONE ENCOUNTER
Spoke with pt and she would like the med sent to [address] and will need a note for today returning Mon the 10th

## 2018-09-06 NOTE — TELEPHONE ENCOUNTER
Patient called stating she can not come in today now because she financially can't afford to miss work again  She did reschedule for tomorrow morning but was wondering if Dr Jes Becerra could call in the dicyclomine 20mg again? She believes she needs to take this again, she is having jaw pain again and a sore throat as well  Patient didn't know if you could call something in today or should she just wait until you see her tomorrow?

## 2018-09-06 NOTE — TELEPHONE ENCOUNTER
Pt called and stated that bentyl was sent to pharmacy, she thought you were sending something for lyme disease - doxy

## 2018-09-11 ENCOUNTER — TELEPHONE (OUTPATIENT)
Dept: FAMILY MEDICINE CLINIC | Facility: CLINIC | Age: 38
End: 2018-09-11

## 2018-09-11 DIAGNOSIS — A69.20 LYME BORRELIOSIS: Primary | ICD-10-CM

## 2018-09-11 DIAGNOSIS — F41.9 ANXIETY: ICD-10-CM

## 2018-09-11 RX ORDER — ALPRAZOLAM 0.5 MG/1
0.5 TABLET ORAL 4 TIMES DAILY PRN
Qty: 120 TABLET | Refills: 0 | Status: SHIPPED | OUTPATIENT
Start: 2018-09-11 | End: 2018-10-09 | Stop reason: SDUPTHER

## 2018-09-11 NOTE — TELEPHONE ENCOUNTER
Patient is also asking if we can due a work excuse for the week because she is really not feeling well

## 2018-09-17 ENCOUNTER — TELEPHONE (OUTPATIENT)
Dept: FAMILY MEDICINE CLINIC | Facility: CLINIC | Age: 38
End: 2018-09-17

## 2018-09-17 NOTE — TELEPHONE ENCOUNTER
Has a note to return today, she doesn't feel she can go bk, she still in pain and vertigo,  She has kerry with Infect  Disease on Oct 2,  Can you give note to be off work until then,  Will call later to ck if ready

## 2018-09-24 DIAGNOSIS — A69.20 LYME DISEASE: ICD-10-CM

## 2018-09-24 RX ORDER — DOXYCYCLINE HYCLATE 100 MG/1
100 CAPSULE ORAL EVERY 12 HOURS SCHEDULED
Qty: 42 CAPSULE | Refills: 0 | Status: SHIPPED | OUTPATIENT
Start: 2018-09-24 | End: 2018-10-11 | Stop reason: ALTCHOICE

## 2018-09-26 ENCOUNTER — TELEPHONE (OUTPATIENT)
Dept: FAMILY MEDICINE CLINIC | Facility: CLINIC | Age: 38
End: 2018-09-26

## 2018-09-26 NOTE — TELEPHONE ENCOUNTER
Can you extend work note to 10/15/18 because her appt with infectious disease is not until 10/10 and she has joint pain, laryngitis, vertigo  Her mom will , please call

## 2018-10-09 DIAGNOSIS — F41.9 ANXIETY: ICD-10-CM

## 2018-10-09 RX ORDER — ALPRAZOLAM 0.5 MG/1
0.5 TABLET ORAL 4 TIMES DAILY PRN
Qty: 120 TABLET | Refills: 1 | Status: SHIPPED | OUTPATIENT
Start: 2018-10-09 | End: 2018-12-06 | Stop reason: SDUPTHER

## 2018-10-10 ENCOUNTER — OFFICE VISIT (OUTPATIENT)
Dept: INFECTIOUS DISEASES | Facility: CLINIC | Age: 38
End: 2018-10-10
Payer: COMMERCIAL

## 2018-10-10 VITALS
SYSTOLIC BLOOD PRESSURE: 105 MMHG | RESPIRATION RATE: 16 BRPM | HEIGHT: 61 IN | WEIGHT: 153.8 LBS | BODY MASS INDEX: 29.04 KG/M2 | TEMPERATURE: 97.6 F | DIASTOLIC BLOOD PRESSURE: 78 MMHG | HEART RATE: 70 BPM

## 2018-10-10 DIAGNOSIS — R53.83 MALAISE AND FATIGUE: ICD-10-CM

## 2018-10-10 DIAGNOSIS — M25.50 ARTHRALGIA, UNSPECIFIED JOINT: ICD-10-CM

## 2018-10-10 DIAGNOSIS — R53.81 MALAISE AND FATIGUE: ICD-10-CM

## 2018-10-10 DIAGNOSIS — A69.20 LYME DISEASE: Primary | ICD-10-CM

## 2018-10-10 PROCEDURE — 99214 OFFICE O/P EST MOD 30 MIN: CPT | Performed by: INTERNAL MEDICINE

## 2018-10-10 RX ORDER — CLINDAMYCIN HYDROCHLORIDE 150 MG/1
CAPSULE ORAL
Refills: 0 | COMMUNITY
Start: 2018-09-21 | End: 2018-12-10 | Stop reason: ALTCHOICE

## 2018-10-10 NOTE — PROGRESS NOTES
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 which when study can persist in patients for up to a year    -discussed in detail with the patient about this likely being post-lyme syndrome  -recommended her stop doxycyline and reviewed that at this point antibiotics will not be of any help and can potentially cause more harm  -recommended avoidance of the prolonged sun exposure and sunscreen for a least a month after stopping the doxy as the side effects can present even after stopping the med    -discussed working towards symptoms management with evaluation by ENT and potentially vestibular rehab  Also discussed seeing PT or a chiropractor to improve her mobility and function  -referred the patient to the [address] [place] [address] and also European guidelines for lyme disease  And her treatment would have been adequate for the manifestations she presented with and even if there was though that she has neurologic sequela  2  On going arthralgias and fatigue: 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    -discussed again as above symptom management   -also discussed seeking out psychology/psychiatry given the on going stressors she has with her job as a way to discuss her feelings of ongoing depression  3  Vertigo and ear fullness: may be related to her prior Lyme exposure, unclear    -recommended formal evaluation by ENT and potential trial of therapies for her reported vertigo  Discussed potential for vestibular rehab if she is diagnosed with ongoing primary vertigo  The above was reviewed with the patient, he  and mother       Patient can be seen in follow up as needed  Antibiotics:      Subjective:  Briefly the patient is a 46-year-old female with no significant past medical history he was seen by the ID service on 08/02 for left thigh rash  After evaluation she was found to have multifocal erythema migrans  She was started on doxycycline 100 mg every 12 hr for 14 days  Additional antibiotics were stopped at that time and it was discussed with her in detail about the persistence of symptoms and ultimately that not indicating treatment failure  Since that time the patient has followed up with her primary care physician  Various notes mention ongoing symptoms of arthralgias nausea and vomiting  On chart review of the patient's medication it appears that she has been getting multiple prescriptions for doxycycline since her discharge  She presents at this time she would like an evaluation given that she is having continued symptoms  Her studies in the hospital for Ehrlichia, babesia and anaplasma were all negative  Lyme testing was positive from her admission  He has no other new labs since August      The patient notes that since her event in August she has ongoing vertigo that hinders her from work  She has fatigue where she finds herself unable to get out of bed and take care of her daughter  She also notes having throbbing pains in her legs that worsen by the end of the day  She finds these pains prevent her from even doing simple errands like going to the grocery store  She reports remaining on doxy since august  She had tried a period being off and felt she got worse  She also continues to have poor appetite and diarrhea while on the doxy  She also notes ongoing fullness in her ears  She was treated at one time with amoxicillin for a double ear infection and was given steroids at that time but it did not resolve her symptoms and she felt it made them progressively worse   She reports ongoing depression from her symptoms and that this has caused strain at work that now she is breaking a part from her   Her  is present and reports that this issue existed prior to the lyme disease and august and likely this just exacerbated the situation  She does not believe she has had tick re-exposure  She denies any travel or new food exposure  She denies any history of STIs and was last tested with her last pregnancy  ROS: A complete 12 point ROS is negative other than that noted in the HPI    Followup portions patient history reviewed and updated as: Allergies, current medications, past medical history, past social history, past surgical history, and the problem list    Objective:  Vitals:  Vitals:    10/10/18 1413   BP: 105/78   Pulse: 70   Resp: 16   Temp: 97 6 °F (36 4 °C)   Weight: 69 8 kg (153 lb 12 8 oz)   Height: 5' 1" (1 549 m)       Physical Exam:   General Appearance:  Alert, interactive, nontoxic, no acute distress  Throat: Oropharynx moist without lesions  Lungs:   Clear to auscultation bilaterally; no audible wheezes, rhonchi or rales; respirations unlabored   Heart:  RRR; no murmur, rub or gallop   Abdomen:   Soft, non-tender, non-distended, positive bowel sounds  Extremities: No clubbing, cyanosis or edema   Skin: No new rashes or lesions  No new draining wounds         Labs, Imaging, & Other studies:   All pertinent labs and imaging studies were personally reviewed    Lab Results   Component Value Date     08/02/2018    K 3 5 08/02/2018     08/02/2018    CO2 29 08/02/2018    ANIONGAP 7 11/19/2015    BUN 12 08/02/2018    CREATININE 0 67 08/02/2018    GLUCOSE 90 11/19/2015    CALCIUM 8 7 08/02/2018    AST 14 08/01/2018    ALT 35 08/01/2018    ALKPHOS 102 08/01/2018    PROT 7 5 11/19/2015    BILITOT 0 50 11/19/2015    EGFR 113 08/02/2018     Lab Results   Component Value Date    WBC 7 00 08/02/2018    HGB 12 4 08/02/2018    HCT 38 0 08/02/2018    MCV 92 08/02/2018     08/02/2018   No results found for: Griselda Blue

## 2018-10-13 DIAGNOSIS — M62.838 MUSCLE SPASMS OF NECK: ICD-10-CM

## 2018-10-15 RX ORDER — CITALOPRAM 40 MG/1
TABLET ORAL
Qty: 90 TABLET | Refills: 0 | Status: SHIPPED | OUTPATIENT
Start: 2018-10-15 | End: 2019-01-20 | Stop reason: SDUPTHER

## 2018-11-12 ENCOUNTER — TELEPHONE (OUTPATIENT)
Dept: FAMILY MEDICINE CLINIC | Facility: CLINIC | Age: 38
End: 2018-11-12

## 2018-11-12 NOTE — TELEPHONE ENCOUNTER
Patient called stating about a week ago she started experiencing head and neck shaking  She states like mini seizures  Which last for about a minute each time  They started about a week ago now and it mostly happens around bedtime she has been experiencing it once or two every night

## 2018-11-13 DIAGNOSIS — R40.4 ALTERED CONSCIOUSNESS: Primary | ICD-10-CM

## 2018-12-03 ENCOUNTER — TELEPHONE (OUTPATIENT)
Dept: FAMILY MEDICINE CLINIC | Facility: CLINIC | Age: 38
End: 2018-12-03

## 2018-12-03 NOTE — TELEPHONE ENCOUNTER
Spoke with patient  She could not do the 530 appointment   She made an appointment with Millie Napoles on Wednesday @ 177

## 2018-12-03 NOTE — TELEPHONE ENCOUNTER
Patient called stating she is having extreme anxiety  Her  was discharged from the hospital and she states she feels like her heart is going to pound out of her chest and she feels like someone is crushing her chest all at the same time  She feels the xanax is not working anymore and is wondering if there is something stronger?      [facility]

## 2018-12-03 NOTE — TELEPHONE ENCOUNTER
Offer 215 tomorrow; I am not comfortable prescribing anything stronger wihtout seeing her and I am fully booked today

## 2018-12-03 NOTE — TELEPHONE ENCOUNTER
You can give her the 530 and 545 time slot tues night or offer with Chastity if this time does not suffice

## 2018-12-03 NOTE — TELEPHONE ENCOUNTER
She has tried taking 2 in the am and 2 pm and it is still not cutting  She tried this herself over the weekend since she didn't know what else to do since her anxiety was so bad      She sounded very anxious and panic stricken on the phone while I was speaking with her
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
## 2018-12-03 NOTE — TELEPHONE ENCOUNTER
Have her try 1 1/2 tabs of the xanax and if that doesn't work, she can go up to 2 tabs at a time  If that is not effective, I am going to need to see her in office

## 2018-12-04 ENCOUNTER — TELEPHONE (OUTPATIENT)
Dept: OTHER | Facility: OTHER | Age: 38
End: 2018-12-04

## 2018-12-06 DIAGNOSIS — F41.9 ANXIETY: ICD-10-CM

## 2018-12-06 RX ORDER — ALPRAZOLAM 0.5 MG/1
0.5 TABLET ORAL 4 TIMES DAILY PRN
Qty: 120 TABLET | Refills: 0 | Status: SHIPPED | OUTPATIENT
Start: 2018-12-06 | End: 2018-12-10 | Stop reason: SDUPTHER

## 2018-12-10 ENCOUNTER — OFFICE VISIT (OUTPATIENT)
Dept: FAMILY MEDICINE CLINIC | Facility: CLINIC | Age: 38
End: 2018-12-10
Payer: COMMERCIAL

## 2018-12-10 VITALS
SYSTOLIC BLOOD PRESSURE: 100 MMHG | TEMPERATURE: 97.5 F | HEART RATE: 74 BPM | BODY MASS INDEX: 28.89 KG/M2 | WEIGHT: 153 LBS | HEIGHT: 61 IN | DIASTOLIC BLOOD PRESSURE: 80 MMHG

## 2018-12-10 DIAGNOSIS — J20.9 ACUTE BRONCHITIS, UNSPECIFIED ORGANISM: Primary | ICD-10-CM

## 2018-12-10 DIAGNOSIS — F41.9 ANXIETY: ICD-10-CM

## 2018-12-10 DIAGNOSIS — R51.9 NONINTRACTABLE HEADACHE, UNSPECIFIED CHRONICITY PATTERN, UNSPECIFIED HEADACHE TYPE: ICD-10-CM

## 2018-12-10 PROCEDURE — 3008F BODY MASS INDEX DOCD: CPT | Performed by: FAMILY MEDICINE

## 2018-12-10 PROCEDURE — 99214 OFFICE O/P EST MOD 30 MIN: CPT | Performed by: FAMILY MEDICINE

## 2018-12-10 RX ORDER — KETOROLAC TROMETHAMINE 30 MG/ML
60 INJECTION, SOLUTION INTRAMUSCULAR; INTRAVENOUS ONCE
Status: COMPLETED | OUTPATIENT
Start: 2018-12-10 | End: 2018-12-10

## 2018-12-10 RX ORDER — PREDNISONE 10 MG/1
TABLET ORAL
Qty: 26 TABLET | Refills: 0 | Status: SHIPPED | OUTPATIENT
Start: 2018-12-10 | End: 2019-05-29

## 2018-12-10 RX ORDER — ALPRAZOLAM 0.5 MG/1
0.5 TABLET ORAL 4 TIMES DAILY PRN
Qty: 120 TABLET | Refills: 0 | Status: SHIPPED | OUTPATIENT
Start: 2018-12-10 | End: 2019-03-28 | Stop reason: SDUPTHER

## 2018-12-10 RX ORDER — BROMPHENIRAMINE MALEATE, PSEUDOEPHEDRINE HYDROCHLORIDE, AND DEXTROMETHORPHAN HYDROBROMIDE 2; 30; 10 MG/5ML; MG/5ML; MG/5ML
10 SYRUP ORAL 4 TIMES DAILY PRN
Qty: 180 ML | Refills: 0 | Status: SHIPPED | OUTPATIENT
Start: 2018-12-10 | End: 2019-05-29

## 2018-12-10 RX ORDER — AZITHROMYCIN 250 MG/1
TABLET, FILM COATED ORAL
Qty: 11 TABLET | Refills: 0 | Status: SHIPPED | OUTPATIENT
Start: 2018-12-10 | End: 2018-12-14

## 2018-12-10 RX ADMIN — KETOROLAC TROMETHAMINE 60 MG: 30 INJECTION, SOLUTION INTRAMUSCULAR; INTRAVENOUS at 11:29
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
## 2018-12-10 NOTE — LETTER
December 10, 2018     Patient: Jaylyn Ellis   YOB: 1980   Date of Visit: 12/10/2018       To Whom it May Concern:    Ismael Nash is under my professional care  She was seen in my office on 12/10/2018  She may return to work on 12/13/18  If you have any questions or concerns, please don't hesitate to call           Sincerely,          Joyce Ross DO        CC: No Recipients

## 2018-12-17 ENCOUNTER — TELEPHONE (OUTPATIENT)
Dept: FAMILY MEDICINE CLINIC | Facility: CLINIC | Age: 38
End: 2018-12-17

## 2018-12-17 NOTE — TELEPHONE ENCOUNTER
Insurance will not pay for xanax for another 30 days and her pharmacy most likely will not even fill it if she was willing to pay cash  I can substiute atarax as needed for anxiety , if she wishes  2- why does she need an antibiotic? ?

## 2018-12-17 NOTE — TELEPHONE ENCOUNTER
Patient called with two different questions  1- Patient lost her Xanax bottle, she believes she threw it out in the medication bag  She is wondering if she could get another refill called in or if there is something else she can take in the mean time? 2- Patient states she is still really sick after the round of Prednisone  She states the prednisone actually made some of her lyme disease issues act up so she was wondering if there was another antibiotic that could be called in?      [facility]

## 2018-12-18 ENCOUNTER — HOSPITAL ENCOUNTER (OUTPATIENT)
Dept: NEUROLOGY | Facility: AMBULATORY SURGERY CENTER | Age: 38
Discharge: HOME | End: 2018-12-18
Payer: COMMERCIAL

## 2018-12-18 DIAGNOSIS — R40.4 ALTERED CONSCIOUSNESS: ICD-10-CM

## 2018-12-18 DIAGNOSIS — J20.9 ACUTE BRONCHITIS, UNSPECIFIED ORGANISM: Primary | ICD-10-CM

## 2018-12-18 DIAGNOSIS — F41.9 ANXIETY: ICD-10-CM

## 2018-12-18 PROCEDURE — 95813 EEG EXTND MNTR 61-119 MIN: CPT | Performed by: PSYCHIATRY & NEUROLOGY

## 2018-12-18 PROCEDURE — 95813 EEG EXTND MNTR 61-119 MIN: CPT

## 2018-12-18 RX ORDER — HYDROXYZINE HYDROCHLORIDE 25 MG/1
25 TABLET, FILM COATED ORAL EVERY 6 HOURS PRN
Qty: 120 TABLET | Refills: 0 | Status: SHIPPED | OUTPATIENT
Start: 2018-12-18 | End: 2019-05-29

## 2018-12-18 RX ORDER — LEVOFLOXACIN 500 MG/1
500 TABLET, FILM COATED ORAL EVERY 24 HOURS
Qty: 10 TABLET | Refills: 0 | Status: SHIPPED | OUTPATIENT
Start: 2018-12-18 | End: 2018-12-28

## 2018-12-18 NOTE — TELEPHONE ENCOUNTER
Pt would like the atarax called into the pharmacy    Also, she stated she was diagnosed with bronchitis last week and still not feeling well, complaining of cough, congestion, right and left ear pain, sore throat, chills/sweats  Wants to know if an antibiotic can be called in or does she need to be seen by you?      [facility]

## 2019-01-20 DIAGNOSIS — M62.838 MUSCLE SPASMS OF NECK: ICD-10-CM

## 2019-01-21 RX ORDER — CITALOPRAM 40 MG/1
TABLET ORAL
Qty: 90 TABLET | Refills: 0 | Status: SHIPPED | OUTPATIENT
Start: 2019-01-21 | End: 2019-05-29

## 2019-01-24 ENCOUNTER — TELEPHONE (OUTPATIENT)
Dept: FAMILY MEDICINE CLINIC | Facility: CLINIC | Age: 39
End: 2019-01-24

## 2019-01-24 NOTE — TELEPHONE ENCOUNTER
Patient called stating she has diarrhea, gas, stomach pain, burping, nausea, throw up burps, stomach is on fire, fever @ 99 6  Does she need to be seen or should something be called in?      [facility]

## 2019-01-24 NOTE — TELEPHONE ENCOUNTER
It sounds like she has a GI virus    Have her  over the counter famotidine (pepcid) and take 1 bid for the next 5 days, clear liquids and advance as tolerated

## 2019-03-11 ENCOUNTER — TELEPHONE (OUTPATIENT)
Dept: FAMILY MEDICINE CLINIC | Facility: CLINIC | Age: 39
End: 2019-03-11

## 2019-03-11 DIAGNOSIS — J11.1 INFLUENZA: Primary | ICD-10-CM

## 2019-03-11 RX ORDER — OSELTAMIVIR PHOSPHATE 75 MG/1
75 CAPSULE ORAL DAILY
Qty: 10 CAPSULE | Refills: 0 | Status: SHIPPED | OUTPATIENT
Start: 2019-03-11 | End: 2019-03-21

## 2019-03-11 NOTE — TELEPHONE ENCOUNTER
dtr was dx with the flu,    Pt has sorethr, no fever, can you call tamiflu    [facility]   Will ck with phamr if no cb

## 2019-03-28 ENCOUNTER — TELEPHONE (OUTPATIENT)
Dept: FAMILY MEDICINE CLINIC | Facility: CLINIC | Age: 39
End: 2019-03-28

## 2019-03-28 DIAGNOSIS — F41.9 ANXIETY: ICD-10-CM

## 2019-03-28 DIAGNOSIS — H10.33 ACUTE BACTERIAL CONJUNCTIVITIS OF BOTH EYES: Primary | ICD-10-CM

## 2019-03-28 RX ORDER — ALPRAZOLAM 0.5 MG/1
0.5 TABLET ORAL 4 TIMES DAILY PRN
Qty: 120 TABLET | Refills: 1 | Status: SHIPPED | OUTPATIENT
Start: 2019-03-28 | End: 2019-03-28 | Stop reason: SDUPTHER

## 2019-03-28 RX ORDER — TOBRAMYCIN 3 MG/ML
1 SOLUTION/ DROPS OPHTHALMIC 4 TIMES DAILY
Qty: 5 ML | Refills: 0 | Status: SHIPPED | OUTPATIENT
Start: 2019-03-28 | End: 2019-05-29

## 2019-03-28 RX ORDER — SULFACETAMIDE SODIUM 100 MG/ML
1 SOLUTION/ DROPS OPHTHALMIC 4 TIMES DAILY
Qty: 5 ML | Refills: 0 | Status: SHIPPED | OUTPATIENT
Start: 2019-03-28 | End: 2019-04-07

## 2019-03-28 RX ORDER — ALPRAZOLAM 0.5 MG/1
0.5 TABLET ORAL 4 TIMES DAILY PRN
Qty: 120 TABLET | Refills: 1 | Status: SHIPPED | OUTPATIENT
Start: 2019-03-28 | End: 2019-08-27 | Stop reason: ALTCHOICE

## 2019-03-28 NOTE — TELEPHONE ENCOUNTER
Patient called back stating the medication Tobramycin is on back order   She is asking if something else can be called in?

## 2019-03-28 NOTE — TELEPHONE ENCOUNTER
Her dtr was dx with pink eye    She woke up this morning with red,itchy,goopy eye,  [facility]  Will ck with pharm if no cb

## 2019-03-28 NOTE — TELEPHONE ENCOUNTER
Left message on script line to refill:    Alprazolam  50 4 times daily     [facility]

## 2019-03-29 DIAGNOSIS — J01.90 ACUTE SINUSITIS, RECURRENCE NOT SPECIFIED, UNSPECIFIED LOCATION: Primary | ICD-10-CM

## 2019-03-29 RX ORDER — BROMPHENIRAMINE MALEATE, PSEUDOEPHEDRINE HYDROCHLORIDE, AND DEXTROMETHORPHAN HYDROBROMIDE 2; 30; 10 MG/5ML; MG/5ML; MG/5ML
10 SYRUP ORAL 4 TIMES DAILY PRN
Qty: 180 ML | Refills: 0 | Status: SHIPPED | OUTPATIENT
Start: 2019-03-29 | End: 2019-05-29

## 2019-03-29 RX ORDER — AZITHROMYCIN 250 MG/1
TABLET, FILM COATED ORAL
Qty: 6 TABLET | Refills: 0 | Status: SHIPPED | OUTPATIENT
Start: 2019-03-29 | End: 2019-04-02

## 2019-05-29 ENCOUNTER — APPOINTMENT (OUTPATIENT)
Dept: LAB | Facility: CLINIC | Age: 39
End: 2019-05-29
Payer: COMMERCIAL

## 2019-05-29 ENCOUNTER — OFFICE VISIT (OUTPATIENT)
Dept: FAMILY MEDICINE CLINIC | Facility: CLINIC | Age: 39
End: 2019-05-29
Payer: COMMERCIAL

## 2019-05-29 VITALS
HEIGHT: 61 IN | BODY MASS INDEX: 27.56 KG/M2 | SYSTOLIC BLOOD PRESSURE: 100 MMHG | DIASTOLIC BLOOD PRESSURE: 64 MMHG | OXYGEN SATURATION: 98 % | RESPIRATION RATE: 16 BRPM | TEMPERATURE: 97.4 F | HEART RATE: 65 BPM | WEIGHT: 146 LBS

## 2019-05-29 DIAGNOSIS — A69.20 LYME DISEASE: ICD-10-CM

## 2019-05-29 DIAGNOSIS — M79.10 MYALGIA: ICD-10-CM

## 2019-05-29 DIAGNOSIS — F41.9 ANXIETY: ICD-10-CM

## 2019-05-29 DIAGNOSIS — M25.50 ARTHRALGIA, UNSPECIFIED JOINT: ICD-10-CM

## 2019-05-29 DIAGNOSIS — M54.16 LUMBAR RADICULOPATHY: ICD-10-CM

## 2019-05-29 DIAGNOSIS — A69.20 LYME DISEASE: Primary | ICD-10-CM

## 2019-05-29 DIAGNOSIS — F33.2 SEVERE EPISODE OF RECURRENT MAJOR DEPRESSIVE DISORDER, WITHOUT PSYCHOTIC FEATURES (HCC): ICD-10-CM

## 2019-05-29 DIAGNOSIS — J01.90 ACUTE SINUSITIS, RECURRENCE NOT SPECIFIED, UNSPECIFIED LOCATION: ICD-10-CM

## 2019-05-29 LAB
CK MB SERPL-MCNC: 1.1 % (ref 0–2.5)
CK MB SERPL-MCNC: 1.7 NG/ML (ref 0–5)
CK SERPL-CCNC: 160 U/L (ref 26–192)
CRP SERPL QL: <3 MG/L
ERYTHROCYTE [SEDIMENTATION RATE] IN BLOOD: 7 MM/HOUR (ref 0–20)
LDH SERPL-CCNC: 176 U/L (ref 81–234)

## 2019-05-29 PROCEDURE — 82553 CREATINE MB FRACTION: CPT

## 2019-05-29 PROCEDURE — 36415 COLL VENOUS BLD VENIPUNCTURE: CPT

## 2019-05-29 PROCEDURE — 83615 LACTATE (LD) (LDH) ENZYME: CPT

## 2019-05-29 PROCEDURE — 3008F BODY MASS INDEX DOCD: CPT | Performed by: FAMILY MEDICINE

## 2019-05-29 PROCEDURE — 82550 ASSAY OF CK (CPK): CPT

## 2019-05-29 PROCEDURE — 86140 C-REACTIVE PROTEIN: CPT

## 2019-05-29 PROCEDURE — 86617 LYME DISEASE ANTIBODY: CPT

## 2019-05-29 PROCEDURE — 86618 LYME DISEASE ANTIBODY: CPT

## 2019-05-29 PROCEDURE — 99215 OFFICE O/P EST HI 40 MIN: CPT | Performed by: FAMILY MEDICINE

## 2019-05-29 PROCEDURE — 85652 RBC SED RATE AUTOMATED: CPT

## 2019-05-29 RX ORDER — PREDNISONE 10 MG/1
TABLET ORAL
Qty: 26 TABLET | Refills: 0 | Status: SHIPPED | OUTPATIENT
Start: 2019-05-29 | End: 2019-08-27 | Stop reason: ALTCHOICE

## 2019-05-29 RX ORDER — AMOXICILLIN AND CLAVULANATE POTASSIUM 875; 125 MG/1; MG/1
1 TABLET, FILM COATED ORAL EVERY 12 HOURS SCHEDULED
Qty: 20 TABLET | Refills: 0 | Status: SHIPPED | OUTPATIENT
Start: 2019-05-29 | End: 2019-06-08

## 2019-05-30 LAB
B BURGDOR IGG SER IA-ACNC: 0.43
B BURGDOR IGM SER IA-ACNC: 3.89

## 2019-06-19 ENCOUNTER — TELEPHONE (OUTPATIENT)
Dept: FAMILY MEDICINE CLINIC | Facility: CLINIC | Age: 39
End: 2019-06-19

## 2019-06-19 DIAGNOSIS — J01.90 ACUTE SINUSITIS, RECURRENCE NOT SPECIFIED, UNSPECIFIED LOCATION: Primary | ICD-10-CM

## 2019-06-19 RX ORDER — AZITHROMYCIN 250 MG/1
TABLET, FILM COATED ORAL
Qty: 6 TABLET | Refills: 0 | Status: SHIPPED | OUTPATIENT
Start: 2019-06-19 | End: 2019-06-23

## 2019-06-19 RX ORDER — BROMPHENIRAMINE MALEATE, PSEUDOEPHEDRINE HYDROCHLORIDE, AND DEXTROMETHORPHAN HYDROBROMIDE 2; 30; 10 MG/5ML; MG/5ML; MG/5ML
10 SYRUP ORAL 4 TIMES DAILY PRN
Qty: 180 ML | Refills: 0 | Status: SHIPPED | OUTPATIENT
Start: 2019-06-19 | End: 2019-08-27 | Stop reason: ALTCHOICE

## 2019-08-27 ENCOUNTER — OFFICE VISIT (OUTPATIENT)
Dept: FAMILY MEDICINE CLINIC | Facility: CLINIC | Age: 39
End: 2019-08-27
Payer: COMMERCIAL

## 2019-08-27 VITALS
DIASTOLIC BLOOD PRESSURE: 70 MMHG | SYSTOLIC BLOOD PRESSURE: 112 MMHG | WEIGHT: 137 LBS | TEMPERATURE: 98.4 F | HEIGHT: 61 IN | HEART RATE: 74 BPM | BODY MASS INDEX: 25.86 KG/M2

## 2019-08-27 DIAGNOSIS — F33.9 RECURRENT MAJOR DEPRESSIVE DISORDER, REMISSION STATUS UNSPECIFIED (HCC): ICD-10-CM

## 2019-08-27 DIAGNOSIS — K90.41 GLUTEN INTOLERANCE: Primary | ICD-10-CM

## 2019-08-27 PROCEDURE — 1036F TOBACCO NON-USER: CPT | Performed by: FAMILY MEDICINE

## 2019-08-27 PROCEDURE — 99214 OFFICE O/P EST MOD 30 MIN: CPT | Performed by: FAMILY MEDICINE

## 2019-08-27 PROCEDURE — 3008F BODY MASS INDEX DOCD: CPT | Performed by: FAMILY MEDICINE

## 2019-08-27 RX ORDER — HYOSCYAMINE SULFATE 0.125 MG
0.12 TABLET,DISINTEGRATING ORAL EVERY 4 HOURS PRN
Qty: 40 TABLET | Refills: 2 | Status: SHIPPED | OUTPATIENT
Start: 2019-08-27 | End: 2020-01-07 | Stop reason: ALTCHOICE
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
## 2019-10-04 ENCOUNTER — TELEPHONE (OUTPATIENT)
Dept: OTHER | Facility: OTHER | Age: 39
End: 2019-10-04

## 2020-01-07 ENCOUNTER — OFFICE VISIT (OUTPATIENT)
Dept: FAMILY MEDICINE CLINIC | Facility: CLINIC | Age: 40
End: 2020-01-07
Payer: COMMERCIAL

## 2020-01-07 VITALS
RESPIRATION RATE: 16 BRPM | TEMPERATURE: 98 F | BODY MASS INDEX: 24.05 KG/M2 | DIASTOLIC BLOOD PRESSURE: 74 MMHG | WEIGHT: 127.4 LBS | HEIGHT: 61 IN | SYSTOLIC BLOOD PRESSURE: 120 MMHG | HEART RATE: 72 BPM

## 2020-01-07 DIAGNOSIS — H69.81 EUSTACHIAN TUBE DYSFUNCTION, RIGHT: ICD-10-CM

## 2020-01-07 DIAGNOSIS — H66.001 NON-RECURRENT ACUTE SUPPURATIVE OTITIS MEDIA OF RIGHT EAR WITHOUT SPONTANEOUS RUPTURE OF TYMPANIC MEMBRANE: Primary | ICD-10-CM

## 2020-01-07 PROCEDURE — 99213 OFFICE O/P EST LOW 20 MIN: CPT | Performed by: NURSE PRACTITIONER

## 2020-01-07 RX ORDER — PREDNISONE 10 MG/1
TABLET ORAL
Qty: 26 TABLET | Refills: 0 | Status: SHIPPED | OUTPATIENT
Start: 2020-01-07 | End: 2020-01-23 | Stop reason: ALTCHOICE
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
## 2020-01-07 NOTE — LETTER
January 7, 2020     Patient: Sharita Lawson   YOB: 1980   Date of Visit: 1/7/2020       To Whom it May Concern:    Gwendolyn Wahl is under my professional care and was seen in the office on 1/7/2020 for an acute illness  Due to the patient's illness , she was unable to start working the week of 01/06/2020  Please excuse her absence and she should be able to return to work on 01/12/2020  If you have any questions or concerns, please don't hesitate to call           Sincerely,          REBECA Meng        CC: No Recipients

## 2020-01-22 ENCOUNTER — TELEPHONE (OUTPATIENT)
Dept: FAMILY MEDICINE CLINIC | Facility: CLINIC | Age: 40
End: 2020-01-22

## 2020-01-22 NOTE — TELEPHONE ENCOUNTER
She is asking for a note for work yesterday and today,  She has the stomach virus,  Yesterday vomiting today diarrhea,  fever  Taking tylenol, drinking gatorade, eating toast and crackers,    She will be asking for the same for his daughter which sees Dr Petra Hart

## 2020-01-23 ENCOUNTER — OFFICE VISIT (OUTPATIENT)
Dept: FAMILY MEDICINE CLINIC | Facility: CLINIC | Age: 40
End: 2020-01-23
Payer: COMMERCIAL

## 2020-01-23 VITALS
TEMPERATURE: 98.4 F | DIASTOLIC BLOOD PRESSURE: 78 MMHG | HEIGHT: 61 IN | BODY MASS INDEX: 23.79 KG/M2 | HEART RATE: 72 BPM | WEIGHT: 126 LBS | SYSTOLIC BLOOD PRESSURE: 118 MMHG

## 2020-01-23 DIAGNOSIS — M54.16 LUMBAR RADICULOPATHY: ICD-10-CM

## 2020-01-23 DIAGNOSIS — R19.7 DIARRHEA, UNSPECIFIED TYPE: ICD-10-CM

## 2020-01-23 DIAGNOSIS — J01.90 ACUTE SINUSITIS, RECURRENCE NOT SPECIFIED, UNSPECIFIED LOCATION: Primary | ICD-10-CM

## 2020-01-23 PROCEDURE — 99214 OFFICE O/P EST MOD 30 MIN: CPT | Performed by: FAMILY MEDICINE

## 2020-01-23 RX ORDER — METHOCARBAMOL 500 MG/1
500 TABLET, FILM COATED ORAL
Qty: 10 TABLET | Refills: 0 | Status: SHIPPED | OUTPATIENT
Start: 2020-01-23 | End: 2020-06-08

## 2020-01-23 RX ORDER — AMOXICILLIN AND CLAVULANATE POTASSIUM 875; 125 MG/1; MG/1
1 TABLET, FILM COATED ORAL EVERY 12 HOURS SCHEDULED
Qty: 20 TABLET | Refills: 0 | Status: SHIPPED | OUTPATIENT
Start: 2020-01-23 | End: 2020-02-02

## 2020-01-23 RX ORDER — PREDNISONE 10 MG/1
TABLET ORAL
Qty: 26 TABLET | Refills: 0 | Status: SHIPPED | OUTPATIENT
Start: 2020-01-23 | End: 2020-01-30 | Stop reason: ALTCHOICE

## 2020-01-23 RX ORDER — DIPHENOXYLATE HYDROCHLORIDE AND ATROPINE SULFATE 2.5; .025 MG/1; MG/1
1 TABLET ORAL 4 TIMES DAILY PRN
Qty: 30 TABLET | Refills: 0 | Status: SHIPPED | OUTPATIENT
Start: 2020-01-23 | End: 2020-02-04 | Stop reason: ALTCHOICE
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
## 2020-01-30 ENCOUNTER — HOSPITAL ENCOUNTER (OUTPATIENT)
Dept: RADIOLOGY | Facility: HOSPITAL | Age: 40
Discharge: HOME | End: 2020-01-30
Payer: COMMERCIAL

## 2020-01-30 ENCOUNTER — TELEPHONE (OUTPATIENT)
Dept: FAMILY MEDICINE CLINIC | Facility: CLINIC | Age: 40
End: 2020-01-30

## 2020-01-30 DIAGNOSIS — R05.9 COUGH: Primary | ICD-10-CM

## 2020-01-30 DIAGNOSIS — R05.9 COUGH: ICD-10-CM

## 2020-01-30 DIAGNOSIS — J06.9 UPPER RESPIRATORY TRACT INFECTION, UNSPECIFIED TYPE: Primary | ICD-10-CM

## 2020-01-30 PROCEDURE — 71046 X-RAY EXAM CHEST 2 VIEWS: CPT

## 2020-01-30 RX ORDER — ALBUTEROL SULFATE 90 UG/1
2 AEROSOL, METERED RESPIRATORY (INHALATION) EVERY 6 HOURS PRN
Qty: 1 INHALER | Refills: 5 | Status: SHIPPED | OUTPATIENT
Start: 2020-01-30

## 2020-01-30 RX ORDER — METHYLPREDNISOLONE 4 MG/1
TABLET ORAL
Qty: 21 EACH | Refills: 0 | Status: SHIPPED | OUTPATIENT
Start: 2020-01-30 | End: 2020-02-04 | Stop reason: ALTCHOICE

## 2020-01-30 NOTE — TELEPHONE ENCOUNTER
1- have her go for a chest xray(dx cough)  2- I will rx an inhaler and have her take mucinex DM to thin out secretions and help her to be able to cough phlegm out      3- I will rx a medrol dosepak for her vertigo

## 2020-01-30 NOTE — TELEPHONE ENCOUNTER
She is still on antibiotic, she finished steroid yesterday,  Her symptoms are now in her chest, she has cough but can't get phlegm out, feels cracking in chest, her vertigo is bad  Pl adv

## 2020-01-31 ENCOUNTER — OFFICE VISIT (OUTPATIENT)
Dept: FAMILY MEDICINE CLINIC | Facility: CLINIC | Age: 40
End: 2020-01-31
Payer: COMMERCIAL

## 2020-01-31 VITALS
OXYGEN SATURATION: 100 % | BODY MASS INDEX: 24.28 KG/M2 | SYSTOLIC BLOOD PRESSURE: 118 MMHG | RESPIRATION RATE: 18 BRPM | HEIGHT: 61 IN | HEART RATE: 68 BPM | TEMPERATURE: 98.6 F | DIASTOLIC BLOOD PRESSURE: 72 MMHG | WEIGHT: 128.6 LBS

## 2020-01-31 DIAGNOSIS — J11.1 INFLUENZA: Primary | ICD-10-CM

## 2020-01-31 PROCEDURE — 3008F BODY MASS INDEX DOCD: CPT | Performed by: FAMILY MEDICINE

## 2020-01-31 PROCEDURE — 99213 OFFICE O/P EST LOW 20 MIN: CPT | Performed by: FAMILY MEDICINE

## 2020-01-31 RX ORDER — OSELTAMIVIR PHOSPHATE 75 MG/1
75 CAPSULE ORAL 2 TIMES DAILY
Qty: 10 CAPSULE | Refills: 0 | Status: SHIPPED | OUTPATIENT
Start: 2020-01-31 | End: 2020-02-04 | Stop reason: ALTCHOICE

## 2020-01-31 RX ORDER — GUAIFENESIN AND CODEINE PHOSPHATE 100; 10 MG/5ML; MG/5ML
5 SOLUTION ORAL 4 TIMES DAILY PRN
Qty: 236 ML | Refills: 0 | Status: SHIPPED | OUTPATIENT
Start: 2020-01-31 | End: 2020-02-04 | Stop reason: ALTCHOICE
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
## 2020-02-04 ENCOUNTER — OFFICE VISIT (OUTPATIENT)
Dept: FAMILY MEDICINE CLINIC | Facility: CLINIC | Age: 40
End: 2020-02-04
Payer: COMMERCIAL

## 2020-02-04 VITALS
HEART RATE: 80 BPM | SYSTOLIC BLOOD PRESSURE: 118 MMHG | HEIGHT: 61 IN | TEMPERATURE: 98.4 F | OXYGEN SATURATION: 99 % | DIASTOLIC BLOOD PRESSURE: 78 MMHG | BODY MASS INDEX: 24.17 KG/M2 | WEIGHT: 128 LBS

## 2020-02-04 DIAGNOSIS — J20.8 ACUTE BRONCHITIS DUE TO OTHER SPECIFIED ORGANISMS: Primary | ICD-10-CM

## 2020-02-04 DIAGNOSIS — B37.9 YEAST INFECTION: ICD-10-CM

## 2020-02-04 PROCEDURE — 1036F TOBACCO NON-USER: CPT | Performed by: NURSE PRACTITIONER

## 2020-02-04 PROCEDURE — 99214 OFFICE O/P EST MOD 30 MIN: CPT | Performed by: NURSE PRACTITIONER

## 2020-02-04 PROCEDURE — 3008F BODY MASS INDEX DOCD: CPT | Performed by: NURSE PRACTITIONER

## 2020-02-04 RX ORDER — METHYLPREDNISOLONE 4 MG/1
TABLET ORAL
Qty: 21 EACH | Refills: 0 | Status: SHIPPED | OUTPATIENT
Start: 2020-02-04 | End: 2020-06-08 | Stop reason: ALTCHOICE

## 2020-02-04 RX ORDER — FLUCONAZOLE 150 MG/1
150 TABLET ORAL ONCE
Qty: 1 TABLET | Refills: 0 | Status: SHIPPED | OUTPATIENT
Start: 2020-02-04 | End: 2020-02-04

## 2020-02-04 RX ORDER — AZITHROMYCIN 250 MG/1
TABLET, FILM COATED ORAL
Qty: 6 TABLET | Refills: 0 | Status: SHIPPED | OUTPATIENT
Start: 2020-02-04 | End: 2020-02-08

## 2020-02-04 NOTE — LETTER
February 4, 2020     Patient: Jeimy Zimmer   YOB: 1980   Date of Visit: 2/4/2020       To Whom it May Concern:    Miller Hubbard is under my professional care and was seen in the office on 01/31/2020 and 2/4/2020  Please excuse her from work from 01/30/2020 until 02/10/2020  She may return to work on 02/10/2020  If you have any questions or concerns, please don't hesitate to call           Sincerely,          REBECA Arthur        CC: No Recipients
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
## 2020-02-04 NOTE — TELEPHONE ENCOUNTER
She is on last day of Tamiflu and steroid,  Her tongue feels weird, its whitish, dry looking, she had Hx of thrush,  Her taste buds are off,  When she coughs she has a lot of back and chest pain, it is a little better    Pl advise

## 2020-02-10 ENCOUNTER — TELEPHONE (OUTPATIENT)
Dept: FAMILY MEDICINE CLINIC | Facility: CLINIC | Age: 40
End: 2020-02-10

## 2020-02-10 DIAGNOSIS — J06.9 UPPER RESPIRATORY TRACT INFECTION, UNSPECIFIED TYPE: Primary | ICD-10-CM

## 2020-02-10 DIAGNOSIS — B37.3 VAGINAL YEAST INFECTION: ICD-10-CM

## 2020-02-10 RX ORDER — FLUCONAZOLE 150 MG/1
150 TABLET ORAL ONCE
Qty: 1 TABLET | Refills: 0 | Status: SHIPPED | OUTPATIENT
Start: 2020-02-10 | End: 2020-02-10

## 2020-02-10 RX ORDER — AZITHROMYCIN 250 MG/1
TABLET, FILM COATED ORAL
Qty: 6 TABLET | Refills: 0 | Status: SHIPPED | OUTPATIENT
Start: 2020-02-10 | End: 2020-02-14

## 2020-02-10 NOTE — TELEPHONE ENCOUNTER
Patient called stating she is asking if she could get another round of antibiotics her mucus is finally breaking up and coming up but she still has a rattling in her chest  She is asking if diflucan as well since the antibiotics mess with her stomach      MARIO- Jose Ovalle  Aware to check with pharmacy

## 2020-03-18 ENCOUNTER — TELEPHONE (OUTPATIENT)
Dept: PSYCHIATRY | Facility: CLINIC | Age: 40
End: 2020-03-18

## 2020-03-18 NOTE — TELEPHONE ENCOUNTER
Behavorial Health Outpatient Intake Questions    Referred by: Self. Please advised interviewee that they need to answer all questions truthfully to allow for best care and any misrepresentations of information may affect their ability to be seen at this clinic   => Was this discussed? Yes     Behavorial Health Outpatient Intake History -     Presenting Problem (in patient's words):  Patient has pent up issues/ emotions from childhood. Her parents  when she was 8. Her friend was murdered when she was 17. She has a history or eating disorders. Neurological lymes disease. She is irritable, has anxiety and finds herself yelling during unnecessary situations. She finds herself isolating a lot. She expresses herself as "I'm just really messed up."  Requesting therapy. Has the patient ever seen or is currently seeing a psychiatrist? No   If yes who/when? If seen as outpatient, have they been seen here (and by whom)? If not seen here, which provider(s) did the patient see and for how long? Has the patient ever seen or currently see a therapist? Yes If yes who/when? Has a member of the patient's family been in therapy here? Yes  If yes, with whom? When she was younger. Has the patient been hospitalized for mental health? No   If yes, how long ago was last hospitalization and where was it? Substance Abuse:No concerns of substance abuse are reported. Does the patient have ICM or CTT? No    Is the patient taking injectable psychiatric medications? No    => If yes, patient cannot be seen here. Communications  Are there any developmental disabilities? No    Does the patient have hearing impairment? No       History-    Has the patient served in the [address] [place] [address] [employer]? No    If yes, have you had combat services? No    Was the patient activated into federal active duty as a member of the [employer] or reserve?  No    Legal History-     Does the patient have any history of arrests, care home/nursing home time, or DUIs? No  If Yes-  1) What types of charges? 2) When were they last incarcerated? 3) Are they currently on parole or probation? Minor Child-    Who has custody of the child? N/A    Is there a custody agreement? N/A    If there is a custody agreement remind parent that they must bring a copy to the first appt or they will not be seen. Intake Team, please check with provider before scheduling if flags come up such as:  - complex case  - legal history (other than DUI)  - communication barrier concerns are present  - if, in your judgment, this needs further review    ACCEPTED as a patient No  => Appointment Date: Thursday, May 7th @ 11:30AM w/ Flex Vazquez    Referred Elsewhere? No    Name of Insurance Co: [address] [place] [address] ID# [id number]  [id number] Phone #  If ins is primary or secondary  If patient is a minor, parents information such as Name, D. O.B of guarantor.

## 2020-04-29 ENCOUNTER — TELEPHONE (OUTPATIENT)
Dept: PSYCHIATRY | Facility: CLINIC | Age: 40
End: 2020-04-29

## 2020-05-04 ENCOUNTER — TELEPHONE (OUTPATIENT)
Dept: PSYCHIATRY | Facility: CLINIC | Age: 40
End: 2020-05-04

## 2020-05-06 ENCOUNTER — TELEPHONE (OUTPATIENT)
Dept: FAMILY MEDICINE CLINIC | Facility: CLINIC | Age: 40
End: 2020-05-06

## 2020-05-07 ENCOUNTER — TELEMEDICINE (OUTPATIENT)
Dept: BEHAVIORAL/MENTAL HEALTH CLINIC | Facility: CLINIC | Age: 40
End: 2020-05-07
Payer: COMMERCIAL

## 2020-05-07 DIAGNOSIS — F33.1 MODERATE EPISODE OF RECURRENT MAJOR DEPRESSIVE DISORDER (HCC): Primary | ICD-10-CM

## 2020-05-07 DIAGNOSIS — F41.1 GENERALIZED ANXIETY DISORDER: ICD-10-CM

## 2020-05-07 PROCEDURE — 90832 PSYTX W PT 30 MINUTES: CPT | Performed by: SOCIAL WORKER

## 2020-06-08 ENCOUNTER — OFFICE VISIT (OUTPATIENT)
Dept: FAMILY MEDICINE CLINIC | Facility: CLINIC | Age: 40
End: 2020-06-08
Payer: COMMERCIAL

## 2020-06-08 VITALS
SYSTOLIC BLOOD PRESSURE: 122 MMHG | HEART RATE: 80 BPM | BODY MASS INDEX: 23.79 KG/M2 | TEMPERATURE: 98.5 F | HEIGHT: 61 IN | DIASTOLIC BLOOD PRESSURE: 80 MMHG | WEIGHT: 126 LBS

## 2020-06-08 DIAGNOSIS — M35.3 PMR (POLYMYALGIA RHEUMATICA) (HCC): Primary | ICD-10-CM

## 2020-06-08 PROCEDURE — 1036F TOBACCO NON-USER: CPT | Performed by: FAMILY MEDICINE

## 2020-06-08 PROCEDURE — 99213 OFFICE O/P EST LOW 20 MIN: CPT | Performed by: FAMILY MEDICINE

## 2020-06-08 PROCEDURE — 96372 THER/PROPH/DIAG INJ SC/IM: CPT | Performed by: FAMILY MEDICINE

## 2020-06-08 PROCEDURE — 3008F BODY MASS INDEX DOCD: CPT | Performed by: FAMILY MEDICINE

## 2020-06-08 RX ORDER — PREDNISONE 10 MG/1
TABLET ORAL
Qty: 26 TABLET | Refills: 0 | Status: SHIPPED | OUTPATIENT
Start: 2020-06-08 | End: 2020-07-07 | Stop reason: ALTCHOICE

## 2020-06-08 RX ORDER — ACETAMINOPHEN AND CODEINE PHOSPHATE 300; 30 MG/1; MG/1
1 TABLET ORAL EVERY 8 HOURS PRN
Qty: 30 TABLET | Refills: 0 | Status: SHIPPED | OUTPATIENT
Start: 2020-06-08 | End: 2021-03-11 | Stop reason: ALTCHOICE

## 2020-06-08 RX ORDER — KETOROLAC TROMETHAMINE 30 MG/ML
60 INJECTION, SOLUTION INTRAMUSCULAR; INTRAVENOUS ONCE
Status: COMPLETED | OUTPATIENT
Start: 2020-06-08 | End: 2020-06-08

## 2020-06-08 RX ORDER — METHYLPREDNISOLONE ACETATE 80 MG/ML
80 INJECTION, SUSPENSION INTRA-ARTICULAR; INTRALESIONAL; INTRAMUSCULAR; SOFT TISSUE ONCE
Status: COMPLETED | OUTPATIENT
Start: 2020-06-08 | End: 2020-06-08

## 2020-06-08 RX ORDER — METAXALONE 800 MG/1
800 TABLET ORAL 3 TIMES DAILY
Qty: 30 TABLET | Refills: 0 | Status: SHIPPED | OUTPATIENT
Start: 2020-06-08 | End: 2020-06-16 | Stop reason: SDUPTHER

## 2020-06-08 RX ADMIN — KETOROLAC TROMETHAMINE 60 MG: 30 INJECTION, SOLUTION INTRAMUSCULAR; INTRAVENOUS at 14:20

## 2020-06-08 RX ADMIN — METHYLPREDNISOLONE ACETATE 80 MG: 80 INJECTION, SUSPENSION INTRA-ARTICULAR; INTRALESIONAL; INTRAMUSCULAR; SOFT TISSUE at 14:20

## 2020-06-10 ENCOUNTER — TELEPHONE (OUTPATIENT)
Dept: FAMILY MEDICINE CLINIC | Facility: CLINIC | Age: 40
End: 2020-06-10

## 2020-06-16 ENCOUNTER — OFFICE VISIT (OUTPATIENT)
Dept: FAMILY MEDICINE CLINIC | Facility: CLINIC | Age: 40
End: 2020-06-16
Payer: COMMERCIAL

## 2020-06-16 VITALS
WEIGHT: 126 LBS | BODY MASS INDEX: 23.79 KG/M2 | TEMPERATURE: 98.7 F | SYSTOLIC BLOOD PRESSURE: 122 MMHG | DIASTOLIC BLOOD PRESSURE: 80 MMHG | HEIGHT: 61 IN | HEART RATE: 76 BPM

## 2020-06-16 DIAGNOSIS — R45.851 PASSIVE SUICIDAL IDEATIONS: ICD-10-CM

## 2020-06-16 DIAGNOSIS — F41.1 GENERALIZED ANXIETY DISORDER: ICD-10-CM

## 2020-06-16 DIAGNOSIS — F33.2 SEVERE EPISODE OF RECURRENT MAJOR DEPRESSIVE DISORDER, WITHOUT PSYCHOTIC FEATURES (HCC): Primary | ICD-10-CM

## 2020-06-16 DIAGNOSIS — Z20.828 EXPOSURE TO SARS-ASSOCIATED CORONAVIRUS: ICD-10-CM

## 2020-06-16 DIAGNOSIS — M35.9 AUTOIMMUNE DISEASE (HCC): ICD-10-CM

## 2020-06-16 PROBLEM — S70.362A: Status: RESOLVED | Noted: 2018-08-01 | Resolved: 2020-06-16

## 2020-06-16 PROBLEM — L53.8 MACULAR ERYTHEMATOUS RASH: Status: RESOLVED | Noted: 2018-08-01 | Resolved: 2020-06-16

## 2020-06-16 PROBLEM — L08.9: Status: RESOLVED | Noted: 2018-08-01 | Resolved: 2020-06-16
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
## 2020-06-16 PROBLEM — R53.83 MALAISE AND FATIGUE: Status: RESOLVED | Noted: 2018-08-02 | Resolved: 2020-06-16

## 2020-06-16 PROBLEM — R53.81 MALAISE AND FATIGUE: Status: RESOLVED | Noted: 2018-08-02 | Resolved: 2020-06-16

## 2020-06-16 PROCEDURE — 96372 THER/PROPH/DIAG INJ SC/IM: CPT | Performed by: FAMILY MEDICINE

## 2020-06-16 PROCEDURE — 3008F BODY MASS INDEX DOCD: CPT | Performed by: FAMILY MEDICINE

## 2020-06-16 PROCEDURE — U0003 INFECTIOUS AGENT DETECTION BY NUCLEIC ACID (DNA OR RNA); SEVERE ACUTE RESPIRATORY SYNDROME CORONAVIRUS 2 (SARS-COV-2) (CORONAVIRUS DISEASE [COVID-19]), AMPLIFIED PROBE TECHNIQUE, MAKING USE OF HIGH THROUGHPUT TECHNOLOGIES AS DESCRIBED BY CMS-2020-01-R: HCPCS

## 2020-06-16 PROCEDURE — 1036F TOBACCO NON-USER: CPT | Performed by: FAMILY MEDICINE

## 2020-06-16 PROCEDURE — 99215 OFFICE O/P EST HI 40 MIN: CPT | Performed by: FAMILY MEDICINE

## 2020-06-16 RX ORDER — DULOXETIN HYDROCHLORIDE 20 MG/1
20 CAPSULE, DELAYED RELEASE ORAL DAILY
Qty: 30 CAPSULE | Refills: 0 | Status: SHIPPED | OUTPATIENT
Start: 2020-06-16 | End: 2020-06-24 | Stop reason: SDUPTHER

## 2020-06-16 RX ORDER — METHYLPREDNISOLONE ACETATE 80 MG/ML
80 INJECTION, SUSPENSION INTRA-ARTICULAR; INTRALESIONAL; INTRAMUSCULAR; SOFT TISSUE ONCE
Status: COMPLETED | OUTPATIENT
Start: 2020-06-16 | End: 2020-06-16

## 2020-06-16 RX ORDER — KETOROLAC TROMETHAMINE 30 MG/ML
30 INJECTION, SOLUTION INTRAMUSCULAR; INTRAVENOUS ONCE
Status: COMPLETED | OUTPATIENT
Start: 2020-06-16 | End: 2020-06-16

## 2020-06-16 RX ORDER — BUSPIRONE HYDROCHLORIDE 5 MG/1
5 TABLET ORAL 3 TIMES DAILY
Qty: 90 TABLET | Refills: 0 | Status: SHIPPED | OUTPATIENT
Start: 2020-06-16 | End: 2020-07-08

## 2020-06-16 RX ORDER — METAXALONE 800 MG/1
800 TABLET ORAL 3 TIMES DAILY
Qty: 90 TABLET | Refills: 0 | Status: SHIPPED | OUTPATIENT
Start: 2020-06-16 | End: 2020-09-04

## 2020-06-16 RX ADMIN — KETOROLAC TROMETHAMINE 30 MG: 30 INJECTION, SOLUTION INTRAMUSCULAR; INTRAVENOUS at 12:14

## 2020-06-16 RX ADMIN — METHYLPREDNISOLONE ACETATE 80 MG: 80 INJECTION, SUSPENSION INTRA-ARTICULAR; INTRALESIONAL; INTRAMUSCULAR; SOFT TISSUE at 12:16

## 2020-06-17 ENCOUNTER — DOCUMENTATION (OUTPATIENT)
Dept: PSYCHOLOGY | Facility: CLINIC | Age: 40
End: 2020-06-17

## 2020-06-18 ENCOUNTER — TELEPHONE (OUTPATIENT)
Dept: FAMILY MEDICINE CLINIC | Facility: CLINIC | Age: 40
End: 2020-06-18

## 2020-06-18 DIAGNOSIS — M35.9 AUTOIMMUNE DISEASE (HCC): Primary | ICD-10-CM

## 2020-06-18 LAB — SARS-COV-2 RNA SPEC QL NAA+PROBE: NOT DETECTED

## 2020-06-22 ENCOUNTER — PATIENT MESSAGE (OUTPATIENT)
Dept: FAMILY MEDICINE CLINIC | Facility: CLINIC | Age: 40
End: 2020-06-22

## 2020-06-22 DIAGNOSIS — M35.3 PMR (POLYMYALGIA RHEUMATICA) (HCC): ICD-10-CM

## 2020-06-22 DIAGNOSIS — E55.9 VITAMIN D DEFICIENCY: ICD-10-CM

## 2020-06-22 DIAGNOSIS — E53.8 B12 DEFICIENCY: ICD-10-CM

## 2020-06-22 DIAGNOSIS — F33.2 SEVERE EPISODE OF RECURRENT MAJOR DEPRESSIVE DISORDER, WITHOUT PSYCHOTIC FEATURES (HCC): ICD-10-CM

## 2020-06-22 DIAGNOSIS — E78.5 DYSLIPIDEMIA: ICD-10-CM

## 2020-06-22 DIAGNOSIS — M06.9 RHEUMATOID ARTHRITIS, INVOLVING UNSPECIFIED SITE, UNSPECIFIED RHEUMATOID FACTOR PRESENCE: Primary | ICD-10-CM

## 2020-06-24 ENCOUNTER — APPOINTMENT (OUTPATIENT)
Dept: LAB | Facility: CLINIC | Age: 40
End: 2020-06-24
Payer: COMMERCIAL

## 2020-06-24 DIAGNOSIS — E78.5 DYSLIPIDEMIA: ICD-10-CM

## 2020-06-24 DIAGNOSIS — M06.9 RHEUMATOID ARTHRITIS, INVOLVING UNSPECIFIED SITE, UNSPECIFIED RHEUMATOID FACTOR PRESENCE: ICD-10-CM

## 2020-06-24 DIAGNOSIS — M35.3 PMR (POLYMYALGIA RHEUMATICA) (HCC): ICD-10-CM

## 2020-06-24 LAB
25(OH)D3 SERPL-MCNC: 18.9 NG/ML (ref 30–100)
ALBUMIN SERPL BCP-MCNC: 4.1 G/DL (ref 3.5–5)
ALP SERPL-CCNC: 68 U/L (ref 46–116)
ALT SERPL W P-5'-P-CCNC: 20 U/L (ref 12–78)
ANION GAP SERPL CALCULATED.3IONS-SCNC: 8 MMOL/L (ref 4–13)
AST SERPL W P-5'-P-CCNC: 10 U/L (ref 5–45)
BASOPHILS # BLD AUTO: 0.03 THOUSANDS/ΜL (ref 0–0.1)
BASOPHILS NFR BLD AUTO: 0 % (ref 0–1)
BILIRUB SERPL-MCNC: 0.23 MG/DL (ref 0.2–1)
BUN SERPL-MCNC: 14 MG/DL (ref 5–25)
CALCIUM SERPL-MCNC: 8.6 MG/DL (ref 8.3–10.1)
CHLORIDE SERPL-SCNC: 103 MMOL/L (ref 100–108)
CHOLEST SERPL-MCNC: 185 MG/DL (ref 50–200)
CO2 SERPL-SCNC: 28 MMOL/L (ref 21–32)
CREAT SERPL-MCNC: 0.78 MG/DL (ref 0.6–1.3)
CRP SERPL QL: <3 MG/L
EOSINOPHIL # BLD AUTO: 0.1 THOUSAND/ΜL (ref 0–0.61)
EOSINOPHIL NFR BLD AUTO: 1 % (ref 0–6)
ERYTHROCYTE [DISTWIDTH] IN BLOOD BY AUTOMATED COUNT: 13.4 % (ref 11.6–15.1)
ERYTHROCYTE [SEDIMENTATION RATE] IN BLOOD: 3 MM/HOUR (ref 0–20)
GFR SERPL CREATININE-BSD FRML MDRD: 97 ML/MIN/1.73SQ M
GLUCOSE P FAST SERPL-MCNC: 94 MG/DL (ref 65–99)
HBV CORE AB SER QL: NORMAL
HBV CORE IGM SER QL: NORMAL
HBV SURFACE AG SER QL: NORMAL
HCT VFR BLD AUTO: 42.1 % (ref 34.8–46.1)
HCV AB SER QL: NORMAL
HDLC SERPL-MCNC: 43 MG/DL
HGB BLD-MCNC: 13.5 G/DL (ref 11.5–15.4)
IMM GRANULOCYTES # BLD AUTO: 0.05 THOUSAND/UL (ref 0–0.2)
IMM GRANULOCYTES NFR BLD AUTO: 1 % (ref 0–2)
LDLC SERPL CALC-MCNC: 116 MG/DL (ref 0–100)
LYMPHOCYTES # BLD AUTO: 2.6 THOUSANDS/ΜL (ref 0.6–4.47)
LYMPHOCYTES NFR BLD AUTO: 25 % (ref 14–44)
MCH RBC QN AUTO: 30.7 PG (ref 26.8–34.3)
MCHC RBC AUTO-ENTMCNC: 32.1 G/DL (ref 31.4–37.4)
MCV RBC AUTO: 96 FL (ref 82–98)
MONOCYTES # BLD AUTO: 0.66 THOUSAND/ΜL (ref 0.17–1.22)
MONOCYTES NFR BLD AUTO: 6 % (ref 4–12)
NEUTROPHILS # BLD AUTO: 7.07 THOUSANDS/ΜL (ref 1.85–7.62)
NEUTS SEG NFR BLD AUTO: 67 % (ref 43–75)
NRBC BLD AUTO-RTO: 0 /100 WBCS
PLATELET # BLD AUTO: 358 THOUSANDS/UL (ref 149–390)
PMV BLD AUTO: 9 FL (ref 8.9–12.7)
POTASSIUM SERPL-SCNC: 4.2 MMOL/L (ref 3.5–5.3)
PROT SERPL-MCNC: 7.1 G/DL (ref 6.4–8.2)
RBC # BLD AUTO: 4.4 MILLION/UL (ref 3.81–5.12)
RHEUMATOID FACT SER QL LA: NEGATIVE
RPR SER QL: NORMAL
SODIUM SERPL-SCNC: 139 MMOL/L (ref 136–145)
TRIGL SERPL-MCNC: 128 MG/DL
TSH SERPL DL<=0.05 MIU/L-ACNC: 1.39 UIU/ML (ref 0.36–3.74)
VIT B12 SERPL-MCNC: 205 PG/ML (ref 100–900)
WBC # BLD AUTO: 10.51 THOUSAND/UL (ref 4.31–10.16)

## 2020-06-24 PROCEDURE — 82306 VITAMIN D 25 HYDROXY: CPT

## 2020-06-24 PROCEDURE — 84443 ASSAY THYROID STIM HORMONE: CPT

## 2020-06-24 PROCEDURE — 86617 LYME DISEASE ANTIBODY: CPT

## 2020-06-24 PROCEDURE — 86140 C-REACTIVE PROTEIN: CPT

## 2020-06-24 PROCEDURE — 82607 VITAMIN B-12: CPT

## 2020-06-24 PROCEDURE — 86618 LYME DISEASE ANTIBODY: CPT

## 2020-06-24 PROCEDURE — 86200 CCP ANTIBODY: CPT

## 2020-06-24 PROCEDURE — 87389 HIV-1 AG W/HIV-1&-2 AB AG IA: CPT

## 2020-06-24 PROCEDURE — 80061 LIPID PANEL: CPT

## 2020-06-24 PROCEDURE — 86705 HEP B CORE ANTIBODY IGM: CPT

## 2020-06-24 PROCEDURE — 86430 RHEUMATOID FACTOR TEST QUAL: CPT

## 2020-06-24 PROCEDURE — 86803 HEPATITIS C AB TEST: CPT

## 2020-06-24 PROCEDURE — 86038 ANTINUCLEAR ANTIBODIES: CPT

## 2020-06-24 PROCEDURE — 87340 HEPATITIS B SURFACE AG IA: CPT

## 2020-06-24 PROCEDURE — 86592 SYPHILIS TEST NON-TREP QUAL: CPT

## 2020-06-24 PROCEDURE — 86704 HEP B CORE ANTIBODY TOTAL: CPT

## 2020-06-24 PROCEDURE — 85652 RBC SED RATE AUTOMATED: CPT

## 2020-06-24 PROCEDURE — 85025 COMPLETE CBC W/AUTO DIFF WBC: CPT

## 2020-06-24 PROCEDURE — 36415 COLL VENOUS BLD VENIPUNCTURE: CPT

## 2020-06-24 PROCEDURE — 80053 COMPREHEN METABOLIC PANEL: CPT

## 2020-06-24 RX ORDER — DULOXETIN HYDROCHLORIDE 20 MG/1
CAPSULE, DELAYED RELEASE ORAL
Qty: 35 CAPSULE | Refills: 0 | Status: SHIPPED | OUTPATIENT
Start: 2020-06-24 | End: 2020-07-07 | Stop reason: SDUPTHER

## 2020-06-24 RX ORDER — MELATONIN
1000 DAILY
Qty: 90 TABLET | Refills: 3 | Status: SHIPPED | OUTPATIENT
Start: 2020-06-24 | End: 2020-07-07 | Stop reason: ALTCHOICE

## 2020-06-24 RX ORDER — CYANOCOBALAMIN (VITAMIN B-12) 2000 MCG
2000 TABLET ORAL DAILY
Qty: 90 TABLET | Refills: 3 | Status: SHIPPED | OUTPATIENT
Start: 2020-06-24 | End: 2020-07-07 | Stop reason: ALTCHOICE

## 2020-06-26 LAB
B BURGDOR IGG PATRN SER IB-IMP: NEGATIVE
B BURGDOR IGG+IGM SER-ACNC: 0.99 ISR (ref 0–0.9)
B BURGDOR IGM PATRN SER IB-IMP: NEGATIVE
B BURGDOR18KD IGG SER QL IB: ABNORMAL
B BURGDOR23KD IGG SER QL IB: ABNORMAL
B BURGDOR23KD IGM SER QL IB: PRESENT
B BURGDOR28KD IGG SER QL IB: ABNORMAL
B BURGDOR30KD IGG SER QL IB: ABNORMAL
B BURGDOR39KD IGG SER QL IB: ABNORMAL
B BURGDOR39KD IGM SER QL IB: ABNORMAL
B BURGDOR41KD IGG SER QL IB: PRESENT
B BURGDOR41KD IGM SER QL IB: ABNORMAL
B BURGDOR45KD IGG SER QL IB: ABNORMAL
B BURGDOR58KD IGG SER QL IB: ABNORMAL
B BURGDOR66KD IGG SER QL IB: ABNORMAL
B BURGDOR93KD IGG SER QL IB: ABNORMAL
CCP IGA+IGG SERPL IA-ACNC: 4 UNITS (ref 0–19)
HIV 1+2 AB+HIV1 P24 AG SERPL QL IA: NORMAL
RYE IGE QN: NEGATIVE

## 2020-07-07 ENCOUNTER — OFFICE VISIT (OUTPATIENT)
Dept: FAMILY MEDICINE CLINIC | Facility: CLINIC | Age: 40
End: 2020-07-07
Payer: COMMERCIAL

## 2020-07-07 ENCOUNTER — APPOINTMENT (OUTPATIENT)
Dept: LAB | Facility: CLINIC | Age: 40
End: 2020-07-07
Payer: COMMERCIAL

## 2020-07-07 ENCOUNTER — PATIENT MESSAGE (OUTPATIENT)
Dept: FAMILY MEDICINE CLINIC | Facility: CLINIC | Age: 40
End: 2020-07-07

## 2020-07-07 VITALS
TEMPERATURE: 96.8 F | HEART RATE: 78 BPM | HEIGHT: 61 IN | BODY MASS INDEX: 23.56 KG/M2 | RESPIRATION RATE: 16 BRPM | DIASTOLIC BLOOD PRESSURE: 72 MMHG | SYSTOLIC BLOOD PRESSURE: 116 MMHG | WEIGHT: 124.8 LBS

## 2020-07-07 DIAGNOSIS — R29.818 DISABILITY DUE TO NEUROLOGICAL DISORDER: ICD-10-CM

## 2020-07-07 DIAGNOSIS — F41.1 GENERALIZED ANXIETY DISORDER: ICD-10-CM

## 2020-07-07 DIAGNOSIS — E04.9 ENLARGED THYROID: ICD-10-CM

## 2020-07-07 DIAGNOSIS — F33.2 SEVERE EPISODE OF RECURRENT MAJOR DEPRESSIVE DISORDER, WITHOUT PSYCHOTIC FEATURES (HCC): ICD-10-CM

## 2020-07-07 DIAGNOSIS — E55.9 VITAMIN D DEFICIENCY: ICD-10-CM

## 2020-07-07 DIAGNOSIS — M79.605 PAIN IN BOTH LOWER EXTREMITIES: ICD-10-CM

## 2020-07-07 DIAGNOSIS — R70.0 ESR RAISED: ICD-10-CM

## 2020-07-07 DIAGNOSIS — E53.8 B12 DEFICIENCY: ICD-10-CM

## 2020-07-07 DIAGNOSIS — M79.604 PAIN IN BOTH LOWER EXTREMITIES: ICD-10-CM

## 2020-07-07 DIAGNOSIS — R29.898 WEAKNESS OF BOTH LOWER EXTREMITIES: ICD-10-CM

## 2020-07-07 DIAGNOSIS — R10.31 RLQ ABDOMINAL PAIN: ICD-10-CM

## 2020-07-07 DIAGNOSIS — Z00.00 ANNUAL PHYSICAL EXAM: Primary | ICD-10-CM

## 2020-07-07 PROBLEM — N95.1 MENOPAUSAL STATE: Status: ACTIVE | Noted: 2020-07-07

## 2020-07-07 PROBLEM — G89.4 CHRONIC PAIN DISORDER: Status: ACTIVE | Noted: 2020-07-07

## 2020-07-07 PROBLEM — E73.9 LACTOSE INTOLERANCE: Status: ACTIVE | Noted: 2020-07-07

## 2020-07-07 PROCEDURE — 3008F BODY MASS INDEX DOCD: CPT | Performed by: FAMILY MEDICINE

## 2020-07-07 PROCEDURE — 99395 PREV VISIT EST AGE 18-39: CPT | Performed by: FAMILY MEDICINE

## 2020-07-07 PROCEDURE — 86800 THYROGLOBULIN ANTIBODY: CPT

## 2020-07-07 PROCEDURE — 99214 OFFICE O/P EST MOD 30 MIN: CPT | Performed by: FAMILY MEDICINE

## 2020-07-07 PROCEDURE — 86376 MICROSOMAL ANTIBODY EACH: CPT

## 2020-07-07 PROCEDURE — 36415 COLL VENOUS BLD VENIPUNCTURE: CPT

## 2020-07-07 PROCEDURE — 96372 THER/PROPH/DIAG INJ SC/IM: CPT | Performed by: FAMILY MEDICINE

## 2020-07-07 PROCEDURE — 1036F TOBACCO NON-USER: CPT | Performed by: FAMILY MEDICINE

## 2020-07-07 RX ORDER — METHYLPREDNISOLONE ACETATE 80 MG/ML
80 INJECTION, SUSPENSION INTRA-ARTICULAR; INTRALESIONAL; INTRAMUSCULAR; SOFT TISSUE ONCE
Status: COMPLETED | OUTPATIENT
Start: 2020-07-07 | End: 2020-07-07

## 2020-07-07 RX ORDER — DULOXETIN HYDROCHLORIDE 60 MG/1
60 CAPSULE, DELAYED RELEASE ORAL DAILY
Qty: 90 CAPSULE | Refills: 1 | Status: SHIPPED | OUTPATIENT
Start: 2020-07-07 | End: 2020-10-01

## 2020-07-07 RX ORDER — IBUPROFEN 200 MG
TABLET ORAL EVERY 6 HOURS PRN
COMMUNITY

## 2020-07-07 RX ADMIN — METHYLPREDNISOLONE ACETATE 80 MG: 80 INJECTION, SUSPENSION INTRA-ARTICULAR; INTRALESIONAL; INTRAMUSCULAR; SOFT TISSUE at 09:27

## 2020-07-07 NOTE — ASSESSMENT & PLAN NOTE
Chronic, improving  Continue Cymbalta - she has now titrated up to 60mg HS per my instructions  Strongly encouraged establishing with counselor   - We discussed the benefits of counseling/therapy support  - Offered referrals as appropriate  - Counseled regarding lifestyle changes, sleep hygiene, breathing exercises, utilizing social supports, and CBT/breathing phone applications   Handout was provided on the same   - ED precautions reviewed for suicidal ideation/plan

## 2020-07-07 NOTE — ASSESSMENT & PLAN NOTE
Chronic, improved significantly with Cymbalta  She has only used Buspar 1-2x  Continue as under depression

## 2020-07-07 NOTE — ASSESSMENT & PLAN NOTE
Resume B12 and D one at a time so we can determine which she may have had reaction to  If necessary, can initiate B12 injections

## 2020-07-07 NOTE — ASSESSMENT & PLAN NOTE
Patient with >3 months of chronic pain and fatigue, in the setting of a history of depression, anxiety, endometriosis, IBS, Hx Lyme Disease, and history of eating disordered behavior   Autoimmune studies thus far have been negative; absence of babesiosis/erlichiosis, pending completion of thyroiditis workup  Once workup is complete - if negative - would diagnose with fibromyalgia vs post Lyme disease syndrome
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
## 2020-07-07 NOTE — PROGRESS NOTES
Marcelina Bowling 1980 female MRN: [id number]    Family Medicine Follow-up Visit    Assessment/Plan   Enlarged thyroid  New  Suspect autoimmune thyroiditis, especially given tenderness to palpation and constellation of symptoms  Obtain US and thyroid antibodies    B12 deficiency  Resume B12 and D one at a time so we can determine which she may have had reaction to  If necessary, can initiate B12 injections     Chronic pain disorder  Patient with >3 months of chronic pain and fatigue, in the setting of a history of depression, anxiety, endometriosis, IBS, Hx Lyme Disease, and history of eating disordered behavior   Autoimmune studies thus far have been negative; absence of babesiosis/erlichiosis, pending completion of thyroiditis workup  Once workup is complete - if negative - would diagnose with fibromyalgia vs post Lyme disease syndrome     Severe episode of recurrent major depressive disorder, without psychotic features ([address] )  Chronic, improving  Continue Cymbalta - she has now titrated up to 60mg HS per my instructions  Strongly encouraged establishing with counselor   - We discussed the benefits of counseling/therapy support  - Offered referrals as appropriate  - Counseled regarding lifestyle changes, sleep hygiene, breathing exercises, utilizing social supports, and CBT/breathing phone applications  Handout was provided on the same   - ED precautions reviewed for suicidal ideation/plan    Generalized anxiety disorder  Chronic, improved significantly with Cymbalta  She has only used Buspar 1-2x  Continue as under depression    Maritza Boss was seen today for physical exam     Diagnoses and all orders for this visit:    Severe episode of recurrent major depressive disorder, without psychotic features ([address] )  -     DULoxetine (CYMBALTA) 60 mg delayed release capsule; Take 1 capsule (60 mg total) by mouth daily    Weakness of both lower extremities  -     Ambulatory referral to Physical Therapy;  Future    Pain in both lower extremities  -     methylPREDNISolone acetate (DEPO-MEDROL) injection 80 mg    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  -     Ambulatory referral to social work care management program; Future    Enlarged thyroid  -     Thyroid Antibodies Panel; Future  -     US thyroid; Future    RLQ abdominal pain  -     US appendix; Future    Annual physical exam    B12 deficiency    Vitamin D deficiency    ESR raised  -     Sedimentation rate, automated; Future    Generalized anxiety disorder    Patient interested in applying for disability due to her chronic pain and her difficulty in being able to work  Patient is currently unable to stand for more than a few minutes at a time due to pain; 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)  Referred to social work for assistance    Dolores Alcantar MD  [address]  7/7/2020      Please be aware that this note contains text that was dictated and there may be errors pertaining to "sound-alike" words during the dictation process  SUBJECTIVE    CC: follow up     HPI:  Marcelina Bowling is a 47 y o  female who presented for a follow-up of multiple issues:    She has ongoing pain, mostly in her thighs and hips  She has sensitivity to light touch  She has also tried compression devices that her sister had for her legs, and these seem to help  She feels a lot of her pain has been worse since her total hysterectomy b/l oophorectomy in 2015  She does feel the Cymbalta is helping her mood and her pain  She found when she was taking Vit D and B12 that she was nauseated and having stomach pains  Thus far, her workup has not been significant for autoimmune processes except for the equivocal Lyme titers that she has had  Her mood has improved somewhat  She denies suicidal thoughts  She reports her sleep is better (sleeping 7-8 hrs nightly, restful) and she is not anxious, thus not take Buspar  She is practicing breathing and mindfulness   She still has thoughts of guilt, lack of self-worth, especially as it connects to her inability to work due to current pain  Patient also reports she has hoarseness and trouble swallowing, burning in her throat, fatigue severe enough she needs to nap  Review of Systems   Constitutional: Positive for fatigue  Negative for activity change, chills, fever and unexpected weight change  HENT: Positive for sore throat, trouble swallowing and voice change  Negative for congestion, ear pain, hearing loss, mouth sores and rhinorrhea  Eyes: Negative for visual disturbance  Respiratory: Negative for cough, shortness of breath and wheezing  Cardiovascular: Negative for chest pain and palpitations  Gastrointestinal: Positive for abdominal distention, abdominal pain, constipation and nausea  Negative for blood in stool, diarrhea and vomiting  Genitourinary: Negative for dysuria, flank pain, frequency and menstrual problem  Musculoskeletal: Positive for arthralgias, back pain, myalgias and neck pain  Skin: Negative for rash  Neurological: Positive for weakness  Negative for dizziness and headaches  Psychiatric/Behavioral: Positive for decreased concentration and dysphoric mood  Negative for sleep disturbance and suicidal ideas  The patient is not nervous/anxious  All other systems reviewed and are negative      Historical Information     The following portions of the patient's history were reviewed and updated as appropriate: allergies, current medications, past medical history, past social history, past surgical history and problem list     Medications:   Meds/Allergies     Current Outpatient Medications:     acetaminophen-codeine (TYLENOL #3) 300-30 mg per tablet, Take 1 tablet by mouth every 8 (eight) hours as needed for moderate pain, Disp: 30 tablet, Rfl: 0    albuterol (PROVENTIL HFA,VENTOLIN HFA) 90 mcg/act inhaler, Inhale 2 puffs every 6 (six) hours as needed for wheezing or shortness of breath, Disp: 1 Inhaler, Rfl: 5    DULoxetine (CYMBALTA) 60 mg delayed release capsule, Take 1 capsule (60 mg total) by mouth daily, Disp: 90 capsule, Rfl: 1    ibuprofen (MOTRIN) 200 mg tablet, Take by mouth every 6 (six) hours as needed for mild pain, Disp: , Rfl:     metaxalone (SKELAXIN) 800 mg tablet, Take 1 tablet (800 mg total) by mouth 3 (three) times a day, Disp: 90 tablet, Rfl: 0    busPIRone (BUSPAR) 5 mg tablet, Take 1 tablet (5 mg total) by mouth 3 (three) times a day (Patient not taking: Reported on 7/7/2020), Disp: 90 tablet, Rfl: 0  No current facility-administered medications for this visit  Allergies   Allergen Reactions    Lactose     Oxycodone-Acetaminophen      Other reaction(s): SUICIDAL THOUGHTS  Reaction Date: [id number];     Tramadol Itching       OBJECTIVE    Vitals:   Vitals:    07/07/20 0829   BP: 116/72   Pulse: 78   Resp: 16   Temp: (!) 96 8 °F (36 °C)   Weight: 56 6 kg (124 lb 12 8 oz)   Height: 5' 1" (1 549 m)     Wt Readings from Last 3 Encounters:   07/07/20 56 6 kg (124 lb 12 8 oz)   06/16/20 57 2 kg (126 lb)   06/08/20 57 2 kg (126 lb)     Body mass index is 23 58 kg/m²  BP Readings from Last 3 Encounters:   07/07/20 116/72   06/16/20 122/80   06/08/20 122/80     Pulse Readings from Last 3 Encounters:   07/07/20 78   06/16/20 76   06/08/20 80     No LMP recorded  Patient has had a hysterectomy  Physical Exam:    Physical Exam   Constitutional: She is oriented to person, place, and time  She appears well-developed and well-nourished  She does not appear ill  No distress  HENT:   Head: Normocephalic and atraumatic  Right Ear: Tympanic membrane, external ear and ear canal normal    Left Ear: Tympanic membrane, external ear and ear canal normal    Nose: Nose normal    Mouth/Throat: Uvula is midline, oropharynx is clear and moist and mucous membranes are normal  No oropharyngeal exudate  No tonsillar exudate     Eyes: Conjunctivae and EOM are normal    Neck: Muscular tenderness present  Decreased range of motion present  Thyromegaly (tenderness to palpation) present  Cardiovascular: Normal rate, regular rhythm, normal heart sounds and intact distal pulses  No murmur heard  Pulmonary/Chest: Effort normal and breath sounds normal  No respiratory distress  She has no decreased breath sounds  She has no wheezes  She has no rhonchi  She has no rales  Abdominal: Soft  Bowel sounds are normal  She exhibits no distension  There is generalized tenderness  There is tenderness at McBurney's point  There is no rigidity, no guarding, no CVA tenderness and negative Abdi's sign  Generalized abdominal tenderness, worse in RLQ causing patient to cry  No rigidity, guarding, equivocal rebound tenderness   Musculoskeletal: She exhibits no edema  Lumbar back: She exhibits decreased range of motion, pain and spasm  Soft palpation of soft tissues at multiple sites demonstrates significant pain in the abscence of swelling, redness, or over injury  Lymphadenopathy:     She has no cervical adenopathy  Neurological: She is alert and oriented to person, place, and time  She displays atrophy  She displays no tremor  A sensory deficit (hyperalgesia throughout bilateral lower extremities) is present  No cranial nerve deficit  Gait (antalgic gait) abnormal  GCS eye subscore is 4  GCS verbal subscore is 5  GCS motor subscore is 6  Slow sit to stand  Antalgic gait  Weakness may be secondary to hyperalgesia vs true weakness 4/5 lower extremities   Skin: Skin is warm and dry  Capillary refill takes less than 2 seconds  Psychiatric: Her mood appears anxious  She exhibits a depressed mood  She expresses no suicidal ideation  Patient no longer having suicidal thoughts  tearful   Nursing note and vitals reviewed  Labs: I have personally reviewed all pertinent results  Imaging:  I have personally reviewed all pertinent results

## 2020-07-07 NOTE — ASSESSMENT & PLAN NOTE
New  Suspect autoimmune thyroiditis, especially given tenderness to palpation and constellation of symptoms  Obtain US and thyroid antibodies

## 2020-07-07 NOTE — PATIENT INSTRUCTIONS
You will call and schedule your thyroid ultrasound, your appendix ultrasound  You will go for thyroid blood work which does not need to be fasting  You will go for a back x-ray which you do not need an appointment for  You will receive a phone call from Tati Berman, my  to help you with disability paperwork  You will trial taking 1 of your vitamins for 1 week and then add the other 1 as long as you do not have any side effects  You will start physical therapy  If you are unable to complete this for 4 weeks you will call me and let me know so that we can meet sooner

## 2020-07-08 ENCOUNTER — PATIENT OUTREACH (OUTPATIENT)
Dept: FAMILY MEDICINE CLINIC | Facility: CLINIC | Age: 40
End: 2020-07-08

## 2020-07-08 DIAGNOSIS — F33.2 SEVERE EPISODE OF RECURRENT MAJOR DEPRESSIVE DISORDER, WITHOUT PSYCHOTIC FEATURES (HCC): ICD-10-CM

## 2020-07-08 LAB — THYROGLOB AB SERPL-ACNC: <1 IU/ML (ref 0–0.9)

## 2020-07-08 RX ORDER — BUSPIRONE HYDROCHLORIDE 5 MG/1
TABLET ORAL
Qty: 90 TABLET | Refills: 3 | Status: SHIPPED | OUTPATIENT
Start: 2020-07-08 | End: 2020-10-29 | Stop reason: SDUPTHER

## 2020-07-09 ENCOUNTER — TELEPHONE (OUTPATIENT)
Dept: FAMILY MEDICINE CLINIC | Facility: CLINIC | Age: 40
End: 2020-07-09

## 2020-07-09 DIAGNOSIS — N95.1 MENOPAUSAL VASOMOTOR SYNDROME: Primary | ICD-10-CM

## 2020-07-09 LAB — THYROPEROXIDASE AB SERPL-ACNC: <9 IU/ML (ref 0–34)

## 2020-07-09 RX ORDER — ESTRADIOL 0.03 MG/D
1 FILM, EXTENDED RELEASE TRANSDERMAL 2 TIMES WEEKLY
Qty: 8 PATCH | Refills: 1 | Status: SHIPPED | OUTPATIENT
Start: 2020-07-09 | End: 2020-09-04

## 2020-07-09 NOTE — TELEPHONE ENCOUNTER
Called patient to discuss vasomotor symptoms  She had been on estrogens following her total hyster w/ bl oophorectomy in 2015, but discontinued after 1 5yrs due to GI upset  She has no personal history of breast cancer and no 1st degree relatives with breast cancer  She does not require progesterones as she no longer has a uterus  She is willing to try MHT again and prefers patch vs pill estrogen  Sent lowest available dosing, 0 025mg/24hrs estradiol  She will call with any concerns  She also thinks she has gluten sensitivity, asking about testing  She has been gluten-free for 1 year and at this time I don't think anything would be gained from testing  All questions answered

## 2020-07-10 ENCOUNTER — PATIENT OUTREACH (OUTPATIENT)
Dept: FAMILY MEDICINE CLINIC | Facility: CLINIC | Age: 40
End: 2020-07-10

## 2020-07-10 NOTE — PROGRESS NOTES
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  Patient chose to close the salon for both her physical and mental health at the time  Patient has attempted to work since then but was unable to do so as she continues to experience chronic pain issues, which prohibits her from successfully performing at work  Patient stated it is very frustrating to have these pain issues at her age; her family is very supportive of her  Patient is being seen by Dr Yadira Blackman at [address] and is very happy with all of her help/support thus far in focusing on her chronic pain  Patient shared that was not ready to apply for SSD in the past but is agreeable to apply at this time  OPCAMARJIT SW discussed different options on how to complete the SSD application  Both ZACHARY FRANK and patient agreed the most efficient way to complete this application would be online  Patient agreeable to [facility] MONIKA sending email to [email or web address] with a link for the SSD Application and the Application Checklist   Patient to review and will call OPCM SW with any questions  Patient denied any additional needs at this time  OPCAMARJIT SW to follow-up with patient in 1-2 weeks to check status of application process  OPCAMARJIT SW to relay this information to [facility] [employer] as well, if additional assistance is needed regarding patient's chronic pain management

## 2020-07-13 ENCOUNTER — PATIENT MESSAGE (OUTPATIENT)
Dept: FAMILY MEDICINE CLINIC | Facility: CLINIC | Age: 40
End: 2020-07-13

## 2020-07-13 DIAGNOSIS — R42 VERTIGO: Primary | ICD-10-CM

## 2020-07-13 RX ORDER — MECLIZINE HCL 12.5 MG/1
12.5 TABLET ORAL 3 TIMES DAILY PRN
Qty: 30 TABLET | Refills: 0 | Status: SHIPPED | OUTPATIENT
Start: 2020-07-13

## 2020-07-15 ENCOUNTER — HOSPITAL ENCOUNTER (OUTPATIENT)
Dept: ULTRASOUND IMAGING | Facility: HOSPITAL | Age: 40
Discharge: HOME | End: 2020-07-15
Payer: COMMERCIAL

## 2020-07-15 ENCOUNTER — HOSPITAL ENCOUNTER (OUTPATIENT)
Dept: RADIOLOGY | Facility: HOSPITAL | Age: 40
Discharge: HOME | End: 2020-07-15
Payer: COMMERCIAL

## 2020-07-15 DIAGNOSIS — M54.16 LUMBAR RADICULOPATHY: ICD-10-CM

## 2020-07-15 DIAGNOSIS — E04.9 ENLARGED THYROID: ICD-10-CM

## 2020-07-15 DIAGNOSIS — R10.31 RLQ ABDOMINAL PAIN: ICD-10-CM

## 2020-07-15 PROCEDURE — 76536 US EXAM OF HEAD AND NECK: CPT

## 2020-07-15 PROCEDURE — 72110 X-RAY EXAM L-2 SPINE 4/>VWS: CPT

## 2020-07-15 PROCEDURE — 76705 ECHO EXAM OF ABDOMEN: CPT

## 2020-07-17 ENCOUNTER — TELEPHONE (OUTPATIENT)
Dept: FAMILY MEDICINE CLINIC | Facility: CLINIC | Age: 40
End: 2020-07-17

## 2020-07-17 ENCOUNTER — PATIENT OUTREACH (OUTPATIENT)
Dept: FAMILY MEDICINE CLINIC | Facility: CLINIC | Age: 40
End: 2020-07-17

## 2020-07-17 DIAGNOSIS — M54.50 LUMBAR BACK PAIN: Primary | ICD-10-CM

## 2020-07-17 NOTE — PROGRESS NOTES
OPCM SW attempted to outreach patient to ensure she received email with SSD application and checklist information  Voicemail left and awaiting return call  OPCM SW to attempt additional outreach in 1 week

## 2020-07-17 NOTE — TELEPHONE ENCOUNTER
----- Message from Magdaleno Carlos DO sent at 7/16/2020 12:44 PM EDT -----  Mild degenerative changes throughout lumbar spine   Suggest PT

## 2020-07-21 ENCOUNTER — PATIENT OUTREACH (OUTPATIENT)
Dept: FAMILY MEDICINE CLINIC | Facility: CLINIC | Age: 40
End: 2020-07-21

## 2020-07-21 NOTE — PROGRESS NOTES
OPCM SW attempted 2nd outreach to patient to ensure she received SSD application and checklist via email  Patient confirmed she did receive the email and information but has not had time to complete it yet  Patient is hoping to look the checklist over and begin the application process soon  Patient stated she would call OPCM SW with any questions  OCPM SW will be available if any questions arise or if she needs assistance with the application process

## 2020-07-27 ENCOUNTER — PATIENT MESSAGE (OUTPATIENT)
Dept: FAMILY MEDICINE CLINIC | Facility: CLINIC | Age: 40
End: 2020-07-27

## 2020-07-27 DIAGNOSIS — M54.50 LUMBAR BACK PAIN: Primary | ICD-10-CM

## 2020-07-27 DIAGNOSIS — R42 VERTIGO: Primary | ICD-10-CM

## 2020-07-27 RX ORDER — PREDNISONE 10 MG/1
TABLET ORAL
Qty: 20 TABLET | Refills: 0 | Status: SHIPPED | OUTPATIENT
Start: 2020-07-27 | End: 2020-09-04

## 2020-08-17 PROBLEM — G43.109 MIGRAINE WITH AURA AND WITHOUT STATUS MIGRAINOSUS, NOT INTRACTABLE: Chronic | Status: ACTIVE | Noted: 2020-08-17

## 2020-08-17 PROBLEM — G57.93 NEUROPATHY INVOLVING BOTH LOWER EXTREMITIES: Chronic | Status: ACTIVE | Noted: 2020-08-17

## 2020-08-18 ENCOUNTER — EVALUATION (OUTPATIENT)
Dept: PHYSICAL THERAPY | Facility: CLINIC | Age: 40
End: 2020-08-18
Payer: COMMERCIAL

## 2020-08-18 DIAGNOSIS — R29.898 WEAKNESS OF BOTH LOWER EXTREMITIES: ICD-10-CM

## 2020-08-18 DIAGNOSIS — G89.29 CHRONIC BILATERAL LOW BACK PAIN WITH BILATERAL SCIATICA: Primary | ICD-10-CM

## 2020-08-18 DIAGNOSIS — M54.41 CHRONIC BILATERAL LOW BACK PAIN WITH BILATERAL SCIATICA: Primary | ICD-10-CM

## 2020-08-18 DIAGNOSIS — M54.42 CHRONIC BILATERAL LOW BACK PAIN WITH BILATERAL SCIATICA: Primary | ICD-10-CM

## 2020-08-18 PROCEDURE — 97162 PT EVAL MOD COMPLEX 30 MIN: CPT | Performed by: PHYSICAL THERAPIST

## 2020-08-18 PROCEDURE — 97110 THERAPEUTIC EXERCISES: CPT | Performed by: PHYSICAL THERAPIST

## 2020-08-18 NOTE — PROGRESS NOTES
PT Evaluation     Today's date: 2020  Patient name: Tobi Joaquin  : 1980  MRN: [id number]  Referring provider: Antonio Navas*  Dx:   Encounter Diagnosis     ICD-10-CM    1  Chronic bilateral low back pain with bilateral sciatica  M54 42     M54 41     G89 29    2  Weakness of both lower extremities  R29 [id number]        Start Time: 0900  Stop Time: 1000  Total time in clinic (min): 60 minutes    Assessment  Assessment details: Pt is a  47 y o female who presents with increased low back pain, increased b/l LE pain, decreased strength, decreased LE ROM, gait deviations, and activity intolerance secondary to symptoms  These impairments limit the patient from participating in work related activities, decreased ability to participate in the community and decreased ability to complete stairs  Pt has PMH of lyme's disease  Pt was highly sensitive to light touch and passive joint motion causing increases in SPR and radicular symptoms  Pt was educated about findings and on POC for PT moving forward  I believe this patient is a good candidate for and will benefit from skilled physical therapy for LE ROM exercises, LE strengthening exercises, functional strengthening exercises, gait training and mechanics training to improve symptoms and assist the patient in improving QOL  Pt is also experiencing vertigo that varies in time lasting and exacerbating actions  Further evaluation of vertigo symptoms will be completed      Positive Prognostic Indicators: desire to improve    Negative Prognostic Indicators: chronic pathology  Impairments: abnormal gait, abnormal muscle tone, abnormal or restricted ROM, abnormal movement, activity intolerance, impaired balance, impaired physical strength, lacks appropriate home exercise program, pain with function and weight-bearing intolerance    Symptom irritability: highUnderstanding of Dx/Px/POC: good   Prognosis: good    Goals  STGs: 4 weeks  1) pt will have a SPR decrease of 2 units at rest  2) pt will have improved L knee extension strength of 1/2 muscle grade  3) pt will have improved foto score of 10 points    LTGs: 8 weeks  1) pt will be independent with HEP by D/C  2) pt will be independent with symptom management by D/C  3) pt will have improved gait mechanics and improved tolerance to walking >50ft in order to improve activity tolerance by DC  Plan  Patient would benefit from: skilled physical therapy  Planned modality interventions: cryotherapy and thermotherapy: hydrocollator packs  Planned therapy interventions: joint mobilization, manual therapy, neuromuscular re-education, patient education, strengthening, stretching, therapeutic exercise, therapeutic activities, home exercise program, gait training and balance/weight bearing training  Frequency: 2x week  Duration in weeks: 6  Plan of Care beginning date: 8/18/2020  Plan of Care expiration date: 9/29/2020  Treatment plan discussed with: patient        Subjective Evaluation    History of Present Illness  Mechanism of injury: DOO: 2018, lyme was fine, and then spell got really sick in December, this is when vertigo happened, June 6th was when legs went out  Vertigo has been going on since December  JOCELYN: insidious       Subjective Comments: pt was diagnosed with lyme in 2018 June 6th, her legs went out from underneath her  She states that since then her legs have gotten better, but continues to ave increased difficulty  Pt states that she has increased N&T in her b/l feet  If she sits for prolonged time, she will have shooting pain  She notes that her leg will lock on her  She also notes that her arms will also lock on her  She has not driven since June due to locking  Pt does not like to sit so she is always moving  She states that she works as a   Always uses ice or heat at night when she lays down  She uses compression garments     Pt states that she wants to move faster but her body rhianna     Vertigo:  Pt states that when she looks up, she feels her vision is getting wavy  She states that she cannot get OOB some days because it is too bad  Everyday she has a "version of it"  Mild medium or severe  Bad days, medication will not help  She uses motion bands of wrist for help  Spinning lasts a couple minutes, but she feels off all day  She notes weather may cause symptoms  Pt states that she feels lightheaded and spinning  If she looks up, its a light headed ness  If she wakes up with it, its a spinning all day  Denies changes to vision, smell and sound  Sometimes she sees double to triple vision  She has light sensitivity  Pain   Rest: 6-7/10   Best: 5/10   Worst: 11/10    Relieving Factors: uses ice and heat    Exacerbating Factors: push her self, walking    Sleeping: goes back and forth  Sometimes she sleeps sometimes she doesn't  Pain will wake her up in the middle of the night  Home Set-up: pt takes one step at a time, going up is better than going down due to pain and legs do not move as fast     ADLs: independent    Work/Hobbies: , has been stepping back to take care of family    Increased pain with cutting hair    Previous Treatment: n/a, Cymbalta, muscle relaxer,   states that she takes 2 tylenol and a motrin or Advil to equal a pain killer  She does this every day    Goals:  Driving, dancing, and work            Objective     Palpation     Additional Palpation Details  Increased TTP throughout   TTP throughout     Passive Range of Motion   Left Hip   Flexion: 100 degrees with pain  Abduction: 45 degrees with pain  External rotation (90/90): 45 degrees with pain  Internal rotation (90/90): 45 degrees with pain    Right Hip   Flexion: 100 degrees with pain  Abduction: 45 degrees with pain  External rotation (90/90): 45 degrees with pain  Internal rotation (90/90): 45 degrees with pain    Strength/Myotome Testing     Left Hip   Planes of Motion   Flexion: 3  Abduction: 3  Adduction: 3    Right Hip   Planes of Motion   Flexion: 3  Abduction: 3  Adduction: 3    Left Knee   Flexion: 3  Extension: 3    Right Knee   Flexion: 3  Extension: 3    Left Ankle/Foot   Dorsiflexion: 3  Plantar flexion: 3    Right Ankle/Foot   Dorsiflexion: 3  Plantar flexion: 3    Additional Strength Details  No over pressure applied secondary to increase in symptoms with AROM    Ambulation     Ambulation: Level Surfaces   Ambulation without assistive device: independent    Observational Gait   Decreased walking speed, stride length, left step length and right step length  Base of support: decreased    Additional Observational Gait Details  Pt ambulates with head level and very cautiously  Functional Assessment        Comments  STS: completed with excessive use of UEs    Increased low back pain and LE pain with transfer             Precautions: Hx migraine, MDD, Hx auto immune disease      Manuals 8/18                                                                Neuro Re-Ed             PPT 5"x10            TAB+ ball squeeze 5"x10            TAB+ supine clamshell 5"x10 rtb            TAB marching                                                    Ther Ex             bike             Side stepping             TG squats             Heel slides             LAQ             SAQ             HR                          Ther Activity                                       Gait Training                                       Modalities

## 2020-08-21 ENCOUNTER — APPOINTMENT (OUTPATIENT)
Dept: PHYSICAL THERAPY | Facility: CLINIC | Age: 40
End: 2020-08-21
Payer: COMMERCIAL

## 2020-08-25 ENCOUNTER — APPOINTMENT (OUTPATIENT)
Dept: PHYSICAL THERAPY | Facility: CLINIC | Age: 40
End: 2020-08-25
Payer: COMMERCIAL

## 2020-08-27 ENCOUNTER — APPOINTMENT (OUTPATIENT)
Dept: PHYSICAL THERAPY | Facility: CLINIC | Age: 40
End: 2020-08-27
Payer: COMMERCIAL

## 2020-09-03 ENCOUNTER — TELEPHONE (OUTPATIENT)
Dept: FAMILY MEDICINE CLINIC | Facility: CLINIC | Age: 40
End: 2020-09-03

## 2020-09-03 NOTE — TELEPHONE ENCOUNTER
Patient wants a virtual visit for her appoint tomorrow   She states she has no      I explain that we only do virtual visits for sick patients   Please advise

## 2020-09-04 ENCOUNTER — TELEMEDICINE (OUTPATIENT)
Dept: FAMILY MEDICINE CLINIC | Facility: CLINIC | Age: 40
End: 2020-09-04
Payer: COMMERCIAL

## 2020-09-04 DIAGNOSIS — F33.2 SEVERE EPISODE OF RECURRENT MAJOR DEPRESSIVE DISORDER, WITHOUT PSYCHOTIC FEATURES (HCC): ICD-10-CM

## 2020-09-04 DIAGNOSIS — M54.50 LUMBAR BACK PAIN: Primary | ICD-10-CM

## 2020-09-04 PROCEDURE — 99214 OFFICE O/P EST MOD 30 MIN: CPT | Performed by: FAMILY MEDICINE

## 2020-09-04 RX ORDER — BACLOFEN 10 MG/1
10 TABLET ORAL 3 TIMES DAILY PRN
Qty: 30 TABLET | Refills: 1 | Status: SHIPPED | OUTPATIENT
Start: 2020-09-04 | End: 2020-10-01 | Stop reason: SDUPTHER

## 2020-09-04 RX ORDER — CYCLOBENZAPRINE HCL 10 MG
10 TABLET ORAL
Qty: 30 TABLET | Refills: 1 | Status: SHIPPED | OUTPATIENT
Start: 2020-09-04 | End: 2020-10-01 | Stop reason: SDUPTHER
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
## 2020-09-04 NOTE — ASSESSMENT & PLAN NOTE
Chronic, worsening  Continue Cymbalta 60mg QD  Increase Buspar - currently she's using PRN  Take it 5mg TID scheduled  May take 2 tablets together for panic attack  Call next week with update
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
## 2020-09-22 ENCOUNTER — OFFICE VISIT (OUTPATIENT)
Dept: RHEUMATOLOGY | Facility: CLINIC | Age: 40
End: 2020-09-22
Payer: COMMERCIAL

## 2020-09-22 ENCOUNTER — LAB (OUTPATIENT)
Dept: LAB | Facility: CLINIC | Age: 40
End: 2020-09-22
Payer: COMMERCIAL

## 2020-09-22 VITALS
WEIGHT: 135 LBS | HEIGHT: 61 IN | SYSTOLIC BLOOD PRESSURE: 110 MMHG | TEMPERATURE: 99.5 F | HEART RATE: 74 BPM | BODY MASS INDEX: 25.49 KG/M2 | DIASTOLIC BLOOD PRESSURE: 64 MMHG

## 2020-09-22 DIAGNOSIS — R53.1 GENERAL WEAKNESS: ICD-10-CM

## 2020-09-22 DIAGNOSIS — F32.A ANXIETY AND DEPRESSION: ICD-10-CM

## 2020-09-22 DIAGNOSIS — R70.0 ESR RAISED: ICD-10-CM

## 2020-09-22 DIAGNOSIS — R20.2 PARESTHESIAS: ICD-10-CM

## 2020-09-22 DIAGNOSIS — F41.9 ANXIETY AND DEPRESSION: ICD-10-CM

## 2020-09-22 DIAGNOSIS — M79.7 FIBROMYALGIA: ICD-10-CM

## 2020-09-22 DIAGNOSIS — E55.9 VITAMIN D DEFICIENCY: ICD-10-CM

## 2020-09-22 DIAGNOSIS — M79.7 FIBROMYALGIA: Primary | ICD-10-CM

## 2020-09-22 DIAGNOSIS — M47.816 OSTEOARTHRITIS OF LUMBAR SPINE, UNSPECIFIED SPINAL OSTEOARTHRITIS COMPLICATION STATUS: ICD-10-CM

## 2020-09-22 LAB
CK MB SERPL-MCNC: 1 % (ref 0–2.5)
CK MB SERPL-MCNC: 1.7 NG/ML (ref 0–5)
CK SERPL-CCNC: 163 U/L (ref 26–192)
ERYTHROCYTE [SEDIMENTATION RATE] IN BLOOD: 5 MM/HOUR (ref 0–19)

## 2020-09-22 PROCEDURE — 82550 ASSAY OF CK (CPK): CPT

## 2020-09-22 PROCEDURE — 99245 OFF/OP CONSLTJ NEW/EST HI 55: CPT | Performed by: INTERNAL MEDICINE

## 2020-09-22 PROCEDURE — 36415 COLL VENOUS BLD VENIPUNCTURE: CPT

## 2020-09-22 PROCEDURE — 85652 RBC SED RATE AUTOMATED: CPT

## 2020-09-22 PROCEDURE — 86255 FLUORESCENT ANTIBODY SCREEN: CPT

## 2020-09-22 PROCEDURE — 86235 NUCLEAR ANTIGEN ANTIBODY: CPT

## 2020-09-22 PROCEDURE — 82553 CREATINE MB FRACTION: CPT

## 2020-09-22 PROCEDURE — 82784 ASSAY IGA/IGD/IGG/IGM EACH: CPT

## 2020-09-22 PROCEDURE — 83516 IMMUNOASSAY NONANTIBODY: CPT

## 2020-09-22 NOTE — PROGRESS NOTES
Pt was evaluated for low back pain on 8/18/2020  Since then, pt had increased difficulty attending OP PT appointments secondary to  and Covid-19  Virtual appointment was made for 9/15/2020 and confirmed with Pt  Pt also did not attend virtual appointment  Phone call follow up was made with no answer or call back  At this time, this patient will be DC from PT    If PT services are indicated, this patient should receive script from MD

## 2020-09-23 LAB
ENA SS-A AB SER-ACNC: <0.2 AI (ref 0–0.9)
ENA SS-B AB SER-ACNC: <0.2 AI (ref 0–0.9)
ENDOMYSIUM IGA SER QL: NEGATIVE
GLIADIN PEPTIDE IGA SER-ACNC: 3 UNITS (ref 0–19)
GLIADIN PEPTIDE IGG SER-ACNC: 3 UNITS (ref 0–19)
IGA SERPL-MCNC: 124 MG/DL (ref 87–352)
TTG IGA SER-ACNC: <2 U/ML (ref 0–3)
TTG IGG SER-ACNC: <2 U/ML (ref 0–5)

## 2020-09-24 ENCOUNTER — TELEPHONE (OUTPATIENT)
Dept: FAMILY MEDICINE CLINIC | Facility: CLINIC | Age: 40
End: 2020-09-24

## 2020-09-24 NOTE — TELEPHONE ENCOUNTER
----- Message from Zeina Marks MD sent at 9/24/2020 10:52 AM EDT -----  Regarding: FW: Visit Follow-Up Question  Contact: [phone]  Pleas call patient for appointment to discuss her pain      ----- Message -----  From: Jim Villalobos MA  Sent: 9/24/2020  10:17 AM EDT  To: Zeina Marks MD  Subject: FW: Visit Follow-Up Question                       ----- Message -----  From: Karen Correa  Sent: 9/24/2020  10:15 AM EDT  To: Bryan Oakes [address] [address] [facility]  Subject: Visit Follow-Up Question                         Hello  I think I was diagnosed yesterday with fibromyalgia  Also she blood tested me for celiac but I dont eat gluten because when i do  I get super sick to my stomach, dizzy and my body actually hurts more  Lately im dealing with worse pain again  I was told to follow up with you, should I make an appointment to come? My life is hectic because my kids are Virtual 730am till 3pm daily  Sorry for all my questions     Thank you  Ted Rivas

## 2020-10-01 ENCOUNTER — OFFICE VISIT (OUTPATIENT)
Dept: FAMILY MEDICINE CLINIC | Facility: CLINIC | Age: 40
End: 2020-10-01
Payer: COMMERCIAL

## 2020-10-01 VITALS
SYSTOLIC BLOOD PRESSURE: 116 MMHG | BODY MASS INDEX: 26.06 KG/M2 | HEIGHT: 61 IN | WEIGHT: 138 LBS | DIASTOLIC BLOOD PRESSURE: 72 MMHG | HEART RATE: 76 BPM | TEMPERATURE: 98.5 F

## 2020-10-01 DIAGNOSIS — M79.7 FIBROMYALGIA: ICD-10-CM

## 2020-10-01 DIAGNOSIS — Z23 IMMUNIZATION DUE: Primary | ICD-10-CM

## 2020-10-01 DIAGNOSIS — K58.2 IRRITABLE BOWEL SYNDROME WITH BOTH CONSTIPATION AND DIARRHEA: ICD-10-CM

## 2020-10-01 DIAGNOSIS — M54.50 LUMBAR BACK PAIN: ICD-10-CM

## 2020-10-01 PROCEDURE — 99214 OFFICE O/P EST MOD 30 MIN: CPT | Performed by: FAMILY MEDICINE

## 2020-10-01 PROCEDURE — 90686 IIV4 VACC NO PRSV 0.5 ML IM: CPT

## 2020-10-01 PROCEDURE — 90471 IMMUNIZATION ADMIN: CPT

## 2020-10-01 PROCEDURE — 1036F TOBACCO NON-USER: CPT | Performed by: FAMILY MEDICINE

## 2020-10-01 RX ORDER — CYCLOBENZAPRINE HCL 10 MG
10 TABLET ORAL
Qty: 30 TABLET | Refills: 1 | Status: SHIPPED | OUTPATIENT
Start: 2020-10-01 | End: 2020-10-05 | Stop reason: SDUPTHER

## 2020-10-01 RX ORDER — BACLOFEN 10 MG/1
10 TABLET ORAL 3 TIMES DAILY PRN
Qty: 30 TABLET | Refills: 1 | Status: SHIPPED | OUTPATIENT
Start: 2020-10-01 | End: 2020-12-03 | Stop reason: SDUPTHER

## 2020-10-01 RX ORDER — HYOSCYAMINE SULFATE 0.125 MG
0.12 TABLET ORAL EVERY 4 HOURS PRN
Qty: 30 TABLET | Refills: 0 | Status: SHIPPED | OUTPATIENT
Start: 2020-10-01

## 2020-10-01 RX ORDER — CYCLOSPORINE 0.5 MG/ML
EMULSION OPHTHALMIC
COMMUNITY
Start: 2020-09-29

## 2020-10-02 ENCOUNTER — TELEPHONE (OUTPATIENT)
Dept: FAMILY MEDICINE CLINIC | Facility: CLINIC | Age: 40
End: 2020-10-02

## 2020-10-02 ENCOUNTER — PATIENT MESSAGE (OUTPATIENT)
Dept: FAMILY MEDICINE CLINIC | Facility: CLINIC | Age: 40
End: 2020-10-02

## 2020-10-02 DIAGNOSIS — J01.10 ACUTE NON-RECURRENT FRONTAL SINUSITIS: Primary | ICD-10-CM

## 2020-10-02 DIAGNOSIS — H66.90 ACUTE OTITIS MEDIA, UNSPECIFIED OTITIS MEDIA TYPE: Primary | ICD-10-CM

## 2020-10-02 DIAGNOSIS — M54.50 LUMBAR BACK PAIN: ICD-10-CM

## 2020-10-02 RX ORDER — AZITHROMYCIN 250 MG/1
TABLET, FILM COATED ORAL
Qty: 6 TABLET | Refills: 0 | Status: SHIPPED | OUTPATIENT
Start: 2020-10-02 | End: 2020-10-06

## 2020-10-04 PROBLEM — M79.7 FIBROMYALGIA: Status: ACTIVE | Noted: 2020-10-04

## 2020-10-04 PROBLEM — M35.9 AUTOIMMUNE DISEASE (HCC): Status: RESOLVED | Noted: 2020-06-16 | Resolved: 2020-10-04

## 2020-10-05 RX ORDER — AMOXICILLIN AND CLAVULANATE POTASSIUM 875; 125 MG/1; MG/1
1 TABLET, FILM COATED ORAL EVERY 12 HOURS SCHEDULED
Qty: 14 TABLET | Refills: 0 | Status: SHIPPED | OUTPATIENT
Start: 2020-10-05 | End: 2020-10-12

## 2020-10-05 RX ORDER — CYCLOBENZAPRINE HCL 10 MG
10 TABLET ORAL
Qty: 30 TABLET | Refills: 3 | Status: SHIPPED | OUTPATIENT
Start: 2020-10-05 | End: 2021-04-08 | Stop reason: SDUPTHER

## 2020-10-12 ENCOUNTER — DOCUMENTATION (OUTPATIENT)
Dept: BEHAVIORAL/MENTAL HEALTH CLINIC | Facility: CLINIC | Age: 40
End: 2020-10-12

## 2020-10-12 ENCOUNTER — TELEPHONE (OUTPATIENT)
Dept: FAMILY MEDICINE CLINIC | Facility: CLINIC | Age: 40
End: 2020-10-12

## 2020-10-12 DIAGNOSIS — H66.90 ACUTE OTITIS MEDIA, UNSPECIFIED OTITIS MEDIA TYPE: Primary | ICD-10-CM

## 2020-10-12 DIAGNOSIS — F33.2 SEVERE EPISODE OF RECURRENT MAJOR DEPRESSIVE DISORDER, WITHOUT PSYCHOTIC FEATURES (HCC): Primary | ICD-10-CM

## 2020-10-12 DIAGNOSIS — F41.1 GENERALIZED ANXIETY DISORDER: ICD-10-CM

## 2020-10-12 RX ORDER — DOXYCYCLINE HYCLATE 100 MG/1
100 CAPSULE ORAL EVERY 12 HOURS SCHEDULED
Qty: 14 CAPSULE | Refills: 0 | Status: SHIPPED | OUTPATIENT
Start: 2020-10-12 | End: 2020-10-19

## 2020-10-13 ENCOUNTER — PATIENT MESSAGE (OUTPATIENT)
Dept: FAMILY MEDICINE CLINIC | Facility: CLINIC | Age: 40
End: 2020-10-13

## 2020-10-29 ENCOUNTER — OFFICE VISIT (OUTPATIENT)
Dept: FAMILY MEDICINE CLINIC | Facility: CLINIC | Age: 40
End: 2020-10-29
Payer: COMMERCIAL

## 2020-10-29 VITALS
RESPIRATION RATE: 16 BRPM | SYSTOLIC BLOOD PRESSURE: 118 MMHG | TEMPERATURE: 98.4 F | HEIGHT: 61 IN | BODY MASS INDEX: 25.79 KG/M2 | DIASTOLIC BLOOD PRESSURE: 74 MMHG | WEIGHT: 136.6 LBS | HEART RATE: 76 BPM

## 2020-10-29 DIAGNOSIS — M79.7 FIBROMYALGIA: ICD-10-CM

## 2020-10-29 DIAGNOSIS — F33.2 SEVERE EPISODE OF RECURRENT MAJOR DEPRESSIVE DISORDER, WITHOUT PSYCHOTIC FEATURES (HCC): Primary | ICD-10-CM

## 2020-10-29 PROCEDURE — 3725F SCREEN DEPRESSION PERFORMED: CPT | Performed by: FAMILY MEDICINE

## 2020-10-29 PROCEDURE — 3008F BODY MASS INDEX DOCD: CPT | Performed by: FAMILY MEDICINE

## 2020-10-29 PROCEDURE — 99215 OFFICE O/P EST HI 40 MIN: CPT | Performed by: FAMILY MEDICINE

## 2020-10-29 RX ORDER — BUSPIRONE HYDROCHLORIDE 10 MG/1
5 TABLET ORAL 3 TIMES DAILY
Qty: 90 TABLET | Refills: 2 | Status: SHIPPED | OUTPATIENT
Start: 2020-10-29 | End: 2020-10-29

## 2020-10-29 RX ORDER — BUPROPION HYDROCHLORIDE 150 MG/1
150 TABLET ORAL EVERY MORNING
Qty: 30 TABLET | Refills: 5 | Status: SHIPPED | OUTPATIENT
Start: 2020-10-29 | End: 2021-03-11 | Stop reason: SDUPTHER

## 2020-10-29 RX ORDER — BUSPIRONE HYDROCHLORIDE 10 MG/1
10 TABLET ORAL 3 TIMES DAILY
Qty: 90 TABLET | Refills: 2 | Status: SHIPPED | OUTPATIENT
Start: 2020-10-29 | End: 2020-11-30

## 2020-11-06 ENCOUNTER — PATIENT MESSAGE (OUTPATIENT)
Dept: FAMILY MEDICINE CLINIC | Facility: CLINIC | Age: 40
End: 2020-11-06

## 2020-11-06 DIAGNOSIS — M79.7 FIBROMYALGIA: ICD-10-CM

## 2020-11-30 DIAGNOSIS — F33.2 SEVERE EPISODE OF RECURRENT MAJOR DEPRESSIVE DISORDER, WITHOUT PSYCHOTIC FEATURES (HCC): ICD-10-CM

## 2020-11-30 RX ORDER — BUSPIRONE HYDROCHLORIDE 10 MG/1
TABLET ORAL
Qty: 90 TABLET | Refills: 2 | Status: SHIPPED | OUTPATIENT
Start: 2020-11-30 | End: 2021-02-23

## 2020-12-01 ENCOUNTER — TELEPHONE (OUTPATIENT)
Dept: FAMILY MEDICINE CLINIC | Facility: CLINIC | Age: 40
End: 2020-12-01

## 2020-12-01 DIAGNOSIS — J01.10 ACUTE NON-RECURRENT FRONTAL SINUSITIS: Primary | ICD-10-CM

## 2020-12-01 RX ORDER — AMOXICILLIN AND CLAVULANATE POTASSIUM 875; 125 MG/1; MG/1
1 TABLET, FILM COATED ORAL EVERY 12 HOURS SCHEDULED
Qty: 14 TABLET | Refills: 0 | Status: SHIPPED | OUTPATIENT
Start: 2020-12-01 | End: 2020-12-08

## 2020-12-03 ENCOUNTER — TELEMEDICINE (OUTPATIENT)
Dept: FAMILY MEDICINE CLINIC | Facility: CLINIC | Age: 40
End: 2020-12-03
Payer: COMMERCIAL

## 2020-12-03 DIAGNOSIS — F33.2 SEVERE EPISODE OF RECURRENT MAJOR DEPRESSIVE DISORDER, WITHOUT PSYCHOTIC FEATURES (HCC): Primary | ICD-10-CM

## 2020-12-03 DIAGNOSIS — J01.00 ACUTE NON-RECURRENT MAXILLARY SINUSITIS: ICD-10-CM

## 2020-12-03 DIAGNOSIS — M79.7 FIBROMYALGIA: ICD-10-CM

## 2020-12-03 DIAGNOSIS — M54.50 LUMBAR BACK PAIN: ICD-10-CM

## 2020-12-03 PROCEDURE — 99213 OFFICE O/P EST LOW 20 MIN: CPT | Performed by: FAMILY MEDICINE

## 2020-12-03 RX ORDER — PREDNISONE 20 MG/1
40 TABLET ORAL DAILY
Qty: 10 TABLET | Refills: 0 | Status: SHIPPED | OUTPATIENT
Start: 2020-12-03 | End: 2020-12-08

## 2020-12-03 RX ORDER — BACLOFEN 10 MG/1
10 TABLET ORAL 3 TIMES DAILY PRN
Qty: 90 TABLET | Refills: 1 | Status: SHIPPED | OUTPATIENT
Start: 2020-12-03

## 2021-02-23 ENCOUNTER — TELEPHONE (OUTPATIENT)
Dept: OTHER | Facility: OTHER | Age: 41
End: 2021-02-23

## 2021-02-23 DIAGNOSIS — F33.2 SEVERE EPISODE OF RECURRENT MAJOR DEPRESSIVE DISORDER, WITHOUT PSYCHOTIC FEATURES (HCC): ICD-10-CM

## 2021-02-23 RX ORDER — BUSPIRONE HYDROCHLORIDE 10 MG/1
TABLET ORAL
Qty: 270 TABLET | Refills: 1 | Status: SHIPPED | OUTPATIENT
Start: 2021-02-23 | End: 2021-04-13 | Stop reason: SDUPTHER

## 2021-02-23 NOTE — TELEPHONE ENCOUNTER
C [x] 2/23/21 8:00 AM 30 Arron Mancilla MD [[id number]] [facility] [place] Que[facility] [[id number]] [facility]     Please call patient back to reschedule

## 2021-03-10 DIAGNOSIS — Z23 ENCOUNTER FOR IMMUNIZATION: ICD-10-CM

## 2021-03-11 ENCOUNTER — OFFICE VISIT (OUTPATIENT)
Dept: FAMILY MEDICINE CLINIC | Facility: CLINIC | Age: 41
End: 2021-03-11
Payer: COMMERCIAL

## 2021-03-11 VITALS
WEIGHT: 137.2 LBS | SYSTOLIC BLOOD PRESSURE: 120 MMHG | DIASTOLIC BLOOD PRESSURE: 90 MMHG | BODY MASS INDEX: 25.9 KG/M2 | TEMPERATURE: 97.8 F | HEIGHT: 61 IN | HEART RATE: 104 BPM | OXYGEN SATURATION: 100 %

## 2021-03-11 DIAGNOSIS — H60.543 DERMATITIS OF EAR CANAL, BILATERAL: ICD-10-CM

## 2021-03-11 DIAGNOSIS — L98.9 SKIN LESION: ICD-10-CM

## 2021-03-11 DIAGNOSIS — I73.00 RAYNAUD'S DISEASE WITHOUT GANGRENE: ICD-10-CM

## 2021-03-11 DIAGNOSIS — F33.2 SEVERE EPISODE OF RECURRENT MAJOR DEPRESSIVE DISORDER, WITHOUT PSYCHOTIC FEATURES (HCC): Primary | ICD-10-CM

## 2021-03-11 PROCEDURE — 3008F BODY MASS INDEX DOCD: CPT | Performed by: PHYSICIAN ASSISTANT

## 2021-03-11 PROCEDURE — 99214 OFFICE O/P EST MOD 30 MIN: CPT | Performed by: FAMILY MEDICINE

## 2021-03-11 PROCEDURE — 3725F SCREEN DEPRESSION PERFORMED: CPT | Performed by: FAMILY MEDICINE

## 2021-03-11 RX ORDER — BUPROPION HYDROCHLORIDE 150 MG/1
150 TABLET ORAL EVERY MORNING
Qty: 90 TABLET | Refills: 1 | Status: SHIPPED | OUTPATIENT
Start: 2021-03-11 | End: 2021-03-17 | Stop reason: SDUPTHER

## 2021-03-11 RX ORDER — NEOMYCIN SULFATE, POLYMYXIN B SULFATE AND HYDROCORTISONE 10; 3.5; 1 MG/ML; MG/ML; [USP'U]/ML
4 SUSPENSION/ DROPS AURICULAR (OTIC) 2 TIMES DAILY
Qty: 10 ML | Refills: 0 | Status: SHIPPED | OUTPATIENT
Start: 2021-03-11 | End: 2021-08-03

## 2021-03-11 NOTE — ASSESSMENT & PLAN NOTE
Severe, with current SI without plan or intent  Feels safe at home  Referred for IOP  Continue current medications: Savella 50mg BID, Wellbutrin 150mg QD, and Buspar 10mg TID  - We discussed the benefits of counseling/therapy support  - Offered referrals as appropriate  - Counseled regarding lifestyle changes, sleep hygiene, breathing exercises, utilizing social supports, and CBT/breathing phone applications   Handout was provided on the same   - ED precautions reviewed for suicidal ideation/plan

## 2021-03-11 NOTE — PROGRESS NOTES
Brynn Sanchez Meischeid 1980 female MRN: [id number]    Family Medicine Follow-up Visit    Assessment/Plan   Skin lesion  Below right eye - bothersome and in line of sight, would like removed  Referred to plastic surgery     Dermatitis of ear canal, bilateral  Trial of corticosteroid drops BID x 5  days    Severe episode of recurrent major depressive disorder, without psychotic features (HCC)  Severe, with current SI without plan or intent  Feels safe at home  Referred for IOP  Continue current medications: Savella 50mg BID, Wellbutrin 150mg QD, and Buspar 10mg TID  - We discussed the benefits of counseling/therapy support  - Offered referrals as appropriate  - Counseled regarding lifestyle changes, sleep hygiene, breathing exercises, utilizing social supports, and CBT/breathing phone applications  Handout was provided on the same   - ED precautions reviewed for suicidal ideation/plan    Raynaud's disease without gangrene  Discussed PPx medications  As spring is approaching will defer at this time    Tristen Gonzalez was seen today for follow-up and earache  Diagnoses and all orders for this visit:    Severe episode of recurrent major depressive disorder, without psychotic features ([address] )  -     buPROPion (WELLBUTRIN XL) 150 mg 24 hr tablet; Take 1 tablet (150 mg total) by mouth every morning    Skin lesion  -     Ambulatory referral to Plastic Surgery; Future    Raynaud's disease without gangrene    Dermatitis of ear canal, bilateral  -     neomycin-polymyxin-hydrocortisone (CORTISPORIN) 0 35%-10,000 units/mL-1% otic suspension; Administer 4 drops into both ears 2 (two) times a day for 5 days        Maximo Dotson MD  [address]  3/11/2021      Please be aware that this note contains text that was dictated and there may be errors pertaining to "sound-alike" words during the dictation process  SUBJECTIVE    CC: Follow-up (wants suicidal treatment, COVID vaccine? ? possible Raynaud syndrome) and Earache    HPI:  Anny Adair is a 47 y o  female who presented for a follow-up of chronic conditions  Depression - getting worse  Now kids are back to in-person school, she wants to start IOP  She is continue to self-harm through cutting  She does have SI but denies plan or intent and states her kids and family support are positive factors  She feels safe to be at home  unfortunately they had to put down the family dog last week  Color change - her mom and grandma have Raynaud's; this winter she started noted color change in her hands and 2nd digit of right toe with cold temperatures, goes white and gets painful, then gets red  Sometimes her toe gets blue  Ears hurt all the time, sometimes hears water in them  Denies discharge, change to hearing ,tinnitus  She has a skin lump below her right eye that she'll sometimes see while working  Sometimes itches  PHQ-9 Depression Screening    PHQ-9:   Frequency of the following problems over the past two weeks:      Little interest or pleasure in doing things: 1 - several days  Feeling down, depressed, or hopeless: 3 - nearly every day  Trouble falling or staying asleep, or sleeping too much: 3 - nearly every day  Feeling tired or having little energy: 3 - nearly every day  Poor appetite or overeatin - more than half the days  Feeling bad about yourself - or that you are a failure or have let yourself or your family down: 3 - nearly every day  Trouble concentrating on things, such as reading the newspaper or watching television: 2 - more than half the days  Moving or speaking so slowly that other people could have noticed   Or the opposite - being so fidgety or restless that you have been moving around a lot more than usual: 1 - several days  Thoughts that you would be better off dead, or of hurting yourself in some way: 2 - more than half the days  PHQ-2 Score: 4  PHQ-9 Score: 20       FREDI-7 Flowsheet Screening      Most Recent Value   Over the last two weeks, how often have you been bothered by the following problems? Feeling nervous, anxious, or on edge  3   Not being able to stop or control worrying  3   Worrying too much about different things  3   Trouble relaxing   3   Being so restless that it's hard to sit still  2   Becoming easily annoyed or irritable   2   Feeling afraid as if something awful might happen  1   How difficult have these problems made it for you to do your work, take care of things at home, or get along with other people? Very difficult   FREDI Score   17          Review of Systems   Constitutional: Positive for activity change, appetite change and fatigue  Negative for fever and unexpected weight change  HENT: Negative for congestion and trouble swallowing  Eyes: Negative for visual disturbance  Respiratory: Negative for chest tightness and shortness of breath  Cardiovascular: Negative for palpitations  Gastrointestinal: Negative for diarrhea and nausea  Genitourinary: Negative for difficulty urinating  Skin: Positive for color change (fingers and 2nd digit of right foot)  Negative for rash  Neurological: Negative for weakness, light-headedness and headaches  Psychiatric/Behavioral: Positive for decreased concentration, dysphoric mood, self-injury and sleep disturbance  Negative for suicidal ideas  The patient is nervous/anxious  All other systems reviewed and are negative      Historical Information     The following portions of the patient's history were reviewed and updated as appropriate: allergies, current medications, past medical history, past social history and problem list     Medications:   Meds/Allergies     Current Outpatient Medications:     albuterol (PROVENTIL HFA,VENTOLIN HFA) 90 mcg/act inhaler, Inhale 2 puffs every 6 (six) hours as needed for wheezing or shortness of breath, Disp: 1 Inhaler, Rfl: 5    baclofen 10 mg tablet, Take 1 tablet (10 mg total) by mouth 3 (three) times a day as needed for muscle spasms, Disp: 90 tablet, Rfl: 1    buPROPion (WELLBUTRIN XL) 150 mg 24 hr tablet, Take 1 tablet (150 mg total) by mouth every morning, Disp: 90 tablet, Rfl: 1    busPIRone (BUSPAR) 10 mg tablet, TAKE 1 TABLET BY MOUTH THREE TIMES A DAY, Disp: 270 tablet, Rfl: 1    cyclobenzaprine (FLEXERIL) 10 mg tablet, Take 1 tablet (10 mg total) by mouth daily at bedtime, Disp: 30 tablet, Rfl: 3    hyoscyamine (ANASPAZ,LEVSIN) 0 125 MG tablet, Take 1 tablet (0 125 mg total) by mouth every 4 (four) hours as needed for cramping, Disp: 30 tablet, Rfl: 0    ibuprofen (MOTRIN) 200 mg tablet, Take by mouth every 6 (six) hours as needed for mild pain, Disp: , Rfl:     meclizine (ANTIVERT) 12 5 MG tablet, Take 1 tablet (12 5 mg total) by mouth 3 (three) times a day as needed for dizziness, Disp: 30 tablet, Rfl: 0    Milnacipran HCl 50 MG TABS, Take 1 tablet by mouth 2 (two) times a day, Disp: 180 tablet, Rfl: 1    Restasis 0 05 % ophthalmic emulsion, , Disp: , Rfl:     neomycin-polymyxin-hydrocortisone (CORTISPORIN) 0 35%-10,000 units/mL-1% otic suspension, Administer 4 drops into both ears 2 (two) times a day for 5 days, Disp: 10 mL, Rfl: 0  Allergies   Allergen Reactions    Lactose     Oxycodone-Acetaminophen      Other reaction(s): SUICIDAL THOUGHTS  Reaction Date: 27Dec2015;     Tramadol Itching       OBJECTIVE    Vitals:   Vitals:    03/11/21 0924   BP: 120/90   Pulse: 104   Temp: 97 8 °F (36 6 °C)   SpO2: 100%   Weight: 62 2 kg (137 lb 3 2 oz)   Height: 5' 1" (1 549 m)     Wt Readings from Last 3 Encounters:   03/11/21 62 2 kg (137 lb 3 2 oz)   10/29/20 62 kg (136 lb 9 6 oz)   10/01/20 62 6 kg (138 lb)     Body mass index is 25 92 kg/m²  BP Readings from Last 3 Encounters:   03/11/21 120/90   10/29/20 118/74   10/01/20 116/72     Pulse Readings from Last 3 Encounters:   03/11/21 104   10/29/20 76   10/01/20 76     No LMP recorded  Patient has had a hysterectomy      Physical Exam:    Physical Exam  Vitals signs and nursing note reviewed  Constitutional:       General: She is not in acute distress  Appearance: Normal appearance  She is well-developed  She is not ill-appearing or diaphoretic  HENT:      Head: Normocephalic and atraumatic  Right Ear: External ear normal  No middle ear effusion  Left Ear: External ear normal   No middle ear effusion  Ears:      Comments: Flaking and erythema to bilateral ear canal     Nose: Nose normal    Eyes:      General: Lids are normal       Conjunctiva/sclera: Conjunctivae normal    Neck:      Vascular: No JVD  Trachea: No tracheal deviation  Pulmonary:      Effort: No accessory muscle usage or respiratory distress  Skin:     Findings: No rash  Neurological:      Mental Status: She is alert  Labs: I have personally reviewed all pertinent results  Imaging:  I have personally reviewed all pertinent results  BMI Counseling: Body mass index is 25 92 kg/m²  The BMI is above normal  Nutrition recommendations include 3-5 servings of fruits/vegetables daily  Depression Screening Follow-up Plan: Patient's depression screening was positive with a PHQ-2 score of 4   Their PHQ-9 score was 20  Patient assessed for underlying major depression  They have no active suicidal ideations  Brief counseling provided and recommend additional follow-up/re-evaluation next office visit

## 2021-03-11 NOTE — PATIENT INSTRUCTIONS
Raynaud Disease   WHAT YOU NEED TO KNOW:   Raynaud disease is a disorder that affects blood circulation, usually in the hands and feet  This disorder causes the arteries (blood vessels) that carry blood to your fingers, toes, ears, or nose to tighten  This is often triggered by cold or emotional stress  The decrease in blood flow causes lack of oxygen and changes in skin color  Over time, ulcers or gangrene (tissue death) may develop if frequent or severe attacks are not prevented  Raynaud disease can be primary or secondary  Primary means it has no clear cause  Secondary means it has a cause and may be related to another medical condition you have  DISCHARGE INSTRUCTIONS:   Follow up with your healthcare provider as directed:  Write down your questions so you remember to ask them during your visits  Skin care:   · Avoid putting too much pressure on your fingertips, such as typing or playing the piano  This kind of pressure may cause your blood vessels to narrow and trigger an attack  · Check your feet and hands daily for numb areas, thinning or thickening skin, black spots, cracks, brittle nails, or ulcers  · Keep your skin clean and dry to prevent an infection  Use lotion that contains lanolin on your hands and feet to keep the skin from drying or cracking  Prevent a Raynaud disease attack:   · Avoid cold temperatures when possible:  Wear gloves, scarves, or other winter garments during the winter months or before you go into cold rooms  · Limit alcohol and caffeine:  Men should limit alcohol to 2 drinks a day  Women should limit alcohol to 1 drink a day  A drink is 12 ounces of beer, 5 ounces of wine, or 1½ ounces of liquor  Try drinking decaffeinated coffee, tea, or soda  Ask your healthcare provider for more information about alcohol and caffeine  · Use caution with medicines:  Talk to your healthcare provider before you use medicines that may trigger an attack   These include certain medicines used for treating high blood pressure, headaches, cancer, or colds  · Exercise regularly: This prevents narrowing of the blood vessels and increases blood flow in your body  · Learn to manage stress:  Stress may trigger an attack  Try new ways to relax, such as deep breathing, meditation, or biofeedback  Biofeedback is a way to control how your body reacts to stress or pain  · Stop smoking:  If you smoke, it is never too late to quit  Smoking causes blood vessels to narrow and may trigger an attack  Ask your healthcare provider for information if you need help quitting  What to do during a Raynaud disease attack:   · Get inside to warm yourself  · Wiggle your fingers or toes, or swing your arms around to increase circulation  Massage the affected parts  · Place your hands under your armpits or run warm water over the affected area  Do not  place the affected part in direct contact with hot water or a hot water bottle  The affected parts could be injured if they are not able to feel that the water is hot  · Get yourself out of stressful situations if possible  Deep breathing, meditation, or biofeedback may help decrease stress  Contact your healthcare provider if:   · You have new symptoms since your last appointment  · Your symptoms prevent you from doing your daily activities  · You need help to quit smoking  · You have questions or concerns about your condition or care  Return to the emergency department if:   · You have many attacks even if you prevent cold, stress, or other triggers  · You have pain in your fingers or toes that does not go away or gets worse  · You have sores or ulcers on the tips of your fingers or toes that do not heal     · You have black spots on your fingers or toes  · Your hands or feet remain cold or discolored even after you warm them      © Copyright [address] Information is for End User's use only and may not be sold, redistributed or otherwise used for commercial purposes  All illustrations and images included in CareNotes® are the copyrighted property of [employer]  or Betty Pierre  The above information is an  only  It is not intended as medical advice for individual conditions or treatments  Talk to your doctor, nurse or pharmacist before following any medical regimen to see if it is safe and effective for you

## 2021-03-16 ENCOUNTER — DOCUMENTATION (OUTPATIENT)
Dept: PSYCHOLOGY | Facility: CLINIC | Age: 41
End: 2021-03-16

## 2021-03-17 ENCOUNTER — OFFICE VISIT (OUTPATIENT)
Dept: PSYCHOLOGY | Facility: CLINIC | Age: 41
End: 2021-03-17
Payer: COMMERCIAL

## 2021-03-17 ENCOUNTER — TELEMEDICINE (OUTPATIENT)
Dept: PSYCHIATRY | Facility: CLINIC | Age: 41
End: 2021-03-17
Payer: COMMERCIAL

## 2021-03-17 DIAGNOSIS — F33.2 SEVERE EPISODE OF RECURRENT MAJOR DEPRESSIVE DISORDER, WITHOUT PSYCHOTIC FEATURES (HCC): Primary | ICD-10-CM

## 2021-03-17 DIAGNOSIS — F41.1 GENERALIZED ANXIETY DISORDER: ICD-10-CM

## 2021-03-17 PROCEDURE — 90792 PSYCH DIAG EVAL W/MED SRVCS: CPT | Performed by: PSYCHIATRY & NEUROLOGY

## 2021-03-17 PROCEDURE — 90791 PSYCH DIAGNOSTIC EVALUATION: CPT

## 2021-03-17 PROCEDURE — S0201 PARTIAL HOSPITALIZATION SERV: HCPCS

## 2021-03-17 RX ORDER — BUPROPION HYDROCHLORIDE 300 MG/1
300 TABLET ORAL EVERY MORNING
Qty: 30 TABLET | Refills: 2 | Status: SHIPPED | OUTPATIENT
Start: 2021-03-17 | End: 2021-05-03 | Stop reason: SDUPTHER

## 2021-03-17 NOTE — PSYCH
Subjective:     Patient ID: Anny Giovany is a 47 y o  female  [facility] Clinical Progress Notes      Specialized Services Documentation  Therapist must complete separate progress note for each specific clinical activity in which the individual participated during the day  This was not shared due to this is a psychotherapy note  GROUP PSYCHOTHERAPY ([id number]) Group was facilitated virtually in a private office using HIPAA Compliant and Approved Microsoft Teams  Krystyna Silva consented to the use of tele-video modality of treatment and was virtually present for group psychotherapy today  The group engaged in discussion about emotions  The handout provided a list of emotions along with common signs and behaviors that can help to identify them  The group was prompted to share three different emotions from the list of anxiety and three behaviors they exhibit when experiencing each  Marlaseamus Silva seemed very engaged throughout the group session and was willing to participate without prompting  Krystyna Silva stated that some related emotions they experience when anxious are feeling jittery, dread, and irritability  Marlaseamus Silva is encouraged to make progress towards goals and objectives through group participation and will continue to attend psychotherapy group    Tx plan objective: 1 1, 1 2   Therapist: Milan Lizama MA
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
## 2021-03-17 NOTE — PSYCH
This note was not shared with the patient due to this is a psychotherapy note    Subjective:     Patient ID: Ervin Kinsey is a 47 y o  female  [facility] Clinical Progress Notes      Specialized Services Documentation  Therapist must complete separate progress note for each specific clinical activity in which the individual participated during the day  This meeting was facilitated virtually in a private office using [employer] and Approved [employer] Teams  Ervin Kinsey consented to the use of tele-video modality of treatment  Other: This initial case management evaluation was facilitated virtually in a private office using HIPAA Compliant and Approved [employer] Teams  Ervin Kinsey consented to the use of tele-video modality of treatment  Reviewed program and given program and Critical access hospital crisis phone numbers  Due to COVID-19 and session being held virtually, Ervin Kinsey verbally consented to releases and health care coordination form  PCP notified of admission and health care coordination form completed  Completed initial psycho-social evaluation and initial treatment goals discussed  Case Management Note    CARLOS Umana    Current suicide risk : [address] denies SI, plan or intent    Medications changes/added/denied? Yes     Treatment session number: 0-intake only    Individual Case Management Visit provided today?  No-intake only    [facility] follow up physician's orders: n/a

## 2021-03-17 NOTE — PSYCH
Assessment/Plan:     Severe episode of recurrent major depressive disorder, without psychotic features    Generalized anxiety disorder    Subjective:     Patient ID: Sharita Lawson is a 47 y o  female  [facility] Treatment Plan     AREAS OF NEED: Depression as evidenced by; excessive tearfulness, frequent crying spells, lack of motivation, hopelessness,     Date Initiated: 03/17/2021    Strengths: Supportive mother and , loves her children     LONG TERM GOAL:     Date Initiated: 03/17/2021    1 0  To decrease depressive symptoms by implementing effective coping skills, from baseline 8/10, to 5/10    Target Date: 04/28/2021    Completion Date:         SHORT TERM OBJECTIVES:     Date Initiated: 03/17/2021    1 1 I will identify 3 positive affirmations I can begin to recite to increase my own confidence and utilize them as I complete at least 1 task daily  Revision Date:     Target Date: 03/26/2021    Completion Date:       Date Initiated: 03/17/2021    1 2 I will identify 3 things I need to do to take care of MYSELF on a daily basis and begin to do them    Revision Date:     Target Date: 03/26/2021    Completion Date:       Date Initiated: 03/17/2021    1 3 I will take medications as prescribed and share questions and concerns if arise  Revision Date:    Target Date: 03/26/2021    Completion Date:        Date Initiated: 03/17/2021    1 4 I will identify 3 ways my supports can assist in my recovery and agree to staff/support contact as indicated       Revision Date:    Target Date: 03/26/2021    Completion Date:             7 DAY REVISION:    Date Initiated:  Revision Date:   Target Date:   Completion Date:        PSYCHIATRY:  Date Initiated: 03/17/2021     Medication management and education    Revision Date:    The person(s) responsible for carrying out the plan is Dr Praveena Arnett MD      NURSING:   Date Initiated: 03/17/2021    1 1,1 2,1 4 RN will provide daily wellness group five days weekly to educate Ngoc Reagan on S/S of her diagnosis and medications used in treatment  Revision Date:        The person(s) responsible for carrying out the plan is Jose Jason RN      PSYCHOLOGY:   Date Initiated: 03/17/2021    1 1,1 2,1 4 Provide psychotherapy group five times per week to allow opportunity for Ngoc Reagan to explore stressors and ways of coping    Revision Date:     The person(s) responsible for carrying out the plan is DINO Shaw and Kulwinder Oquendo      ALLIED THERAPY:   Date Initiated: 03/17/2021    Revision Date:     1 1,1 2 Engage Ngoc Reagan in AT group five times weekly to encourage development and use of wellness tools to decrease symptoms and promote recovery through meaningful activity  The person(s) responsible for carrying out the plan is HERLINDA Flores      CASE MANAGEMENT:   Date Initiated: 03/17/2021    Revision Date:          1 0 Engage in individual case management 3-4 times weekly, to discuss and prepare for aftercare               services, discuss progress and other community resources  UR contact as necessary  The person(s) responsible for carrying out the plan is DINO Shaw      TREATMENT REVIEW/COMMENTS:     DISCHARGE CRITERIA: Identify 3 signs of progress and complete relapse prevention plan  DISCHARGE PLAN: OP therapy    Estimated Length of Stay: 10 treatment days           Diagnosis and Treatment Plan explained to Sydnie Kiana relates understanding diagnosis and is agreeable to Treatment Plan           CLIENT COMMENTS / Please share your thoughts, feelings, need and/or experiences regarding your treatment plan: _____________________________________________________________________________________________________________________________________________________________________________________________________________________________________________________________________________________________________________________ Date/Time: ______________     Patient Signature: _________________________________     Libby Layne and Reviewed while online during TEAMS initial case management evaluation - Mago Grady  agreeable 03/17/2021 at 1030     DUE TO COVID-19, CURRENTLY ALL INTERVENTIONS ARE BEING OFFERED VIRTUALLY    Date/Time: ______________      Signature: _________________________________        Date/Time: ______________
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
## 2021-03-18 ENCOUNTER — OFFICE VISIT (OUTPATIENT)
Dept: PSYCHOLOGY | Facility: CLINIC | Age: 41
End: 2021-03-18
Payer: COMMERCIAL

## 2021-03-18 DIAGNOSIS — F41.1 GENERALIZED ANXIETY DISORDER: ICD-10-CM

## 2021-03-18 DIAGNOSIS — F33.2 SEVERE EPISODE OF RECURRENT MAJOR DEPRESSIVE DISORDER, WITHOUT PSYCHOTIC FEATURES (HCC): Primary | ICD-10-CM

## 2021-03-18 PROCEDURE — S0201 PARTIAL HOSPITALIZATION SERV: HCPCS

## 2021-03-18 PROCEDURE — G0410 GRP PSYCH PARTIAL HOSP 45-50: HCPCS

## 2021-03-18 PROCEDURE — G0177 OPPS/PHP; TRAIN & EDUC SERV: HCPCS

## 2021-03-18 NOTE — PSYCH
This note was not shared with the patient due to this is a psychotherapy note    Subjective:     Patient ID: Graciela Miles is a 47 y o  female  [facility] Clinical Progress Notes      Specialized Services Documentation  Therapist must complete separate progress note for each specific clinical activity in which the individual participated during the day  Group Psychotherapy [id number]    This group was facilitated virtually in a private office using [employer] and Approved [employer] Teams  Graciela Miles consented to the use of tele-video modality of treatment  Psychotherapy group focused on cognitive distortions and tools to challenge negative thoughts of oneself, others, and the world  First, psychoeducation was provided on the cognitive model and different types of cognitive distortions  Through discussion, Graciela Miles was able to gain increased awareness of how her thoughts effect ones feelings and behaviors  Graciela Miles was introduced to tools to begin challenging negative thoughts  Tools discussed include: thought record log, socratic questioning, decatastrophizing, and putting thoughts on trial  Graciela Miles shared her cognitive distortions and made the connections to her childhood  She shared the specific distortions and was receptive to feedback from peers  Positive progress toward goals  Graciela Miles is encouraged to make progress toward goals and objectives through participation and will continue to attend psychotherapy group        Tx Plan Objective: 1 1,1 2, Therapist:  CARLOS Elise      Education Therapy   [id number] Graciela Miles actively shared in morning assessment and goal review  Presented as Receptive related to readiness to learn  Graciela Miles did not complete goal from last treatment day identifying gaining n/a-first day of program  did not present with any barriers to learning       Mariajose Johnson 6430 engaged throughout the treatment day  Was engaged in learning related to [employer]  Staff utilized Verbal and A/V teaching methods  [address] shared area of learning and set a goal for outside of program to not become escalated and angry towards her family members by using deep breaths  Tx Plan Objective: 1 1,1 2, Therapist:  CARLOS Elise    Other: This writer met with [address] for Tomasz Rodriguez  She reported having a great first day, and that she is feeling less anxious about the group setting and feels that it is a positive environment that she looks forward to continue  [address] confirmed that her daughter is negative for COVID, and that she will be able to continue with the program without absences due to having to care for her children during program time  This writer contacted insurance provider, informed no pre-certification is needed, spoke to [employer]  Inquiry code [id number]  Case Management Note    CARLOS Elise    Current suicide risk : [address] denies SI, plan or intent    Medications changes/added/denied? No    Treatment session number: 1    Individual Case Management Visit provided today?  Yes     [facility] follow up physician's orders: n/a

## 2021-03-18 NOTE — PSYCH
This note was not shared with the patient due to this is a psychotherapy note    Subjective:     Patient ID: Monserrat Mcknight is a 47 y o  female  [facility] Clinical Progress Notes      Specialized Services Documentation  Therapist must complete separate progress note for each specific clinical activity in which the individual participated during the day  This group was facilitated virtually in a private office using [employer] and Approved [employer] Teams  Monserrat Mcknight consented to the use of tele-video modality of treatment  Group Psychotherapy     ([id number])    Monserrat Mcknight actively participated in psychoeducational group on taking responsibility for physical health and well being  Group members were paired up and encouraged to explore different categories of physical health which included:  weight, flexibility, skin, strength, eyes, nutrition, dental, foot care, and preventative care  They then discussed ideas on how to take responsibility for each physical aspect  Monserrat Mcknight was quiet, unless prompted  Some slow progress toward goal noted  Continue psychotherapy to educate on the importance of making physical health a priority          Tx Plan Objective: 1 3, 1 4 Therapist:  Venkata Fonseca RN
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
## 2021-03-19 ENCOUNTER — OFFICE VISIT (OUTPATIENT)
Dept: PSYCHOLOGY | Facility: CLINIC | Age: 41
End: 2021-03-19
Payer: COMMERCIAL

## 2021-03-19 DIAGNOSIS — F33.2 SEVERE EPISODE OF RECURRENT MAJOR DEPRESSIVE DISORDER, WITHOUT PSYCHOTIC FEATURES (HCC): Primary | ICD-10-CM

## 2021-03-19 DIAGNOSIS — F41.1 GENERALIZED ANXIETY DISORDER: ICD-10-CM

## 2021-03-19 PROCEDURE — S0201 PARTIAL HOSPITALIZATION SERV: HCPCS

## 2021-03-19 PROCEDURE — G0177 OPPS/PHP; TRAIN & EDUC SERV: HCPCS

## 2021-03-19 PROCEDURE — G0410 GRP PSYCH PARTIAL HOSP 45-50: HCPCS
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
## 2021-03-19 NOTE — PSYCH
Subjective:     Patient ID: Jalen Weldon is a 47 y o  female  [facility] Clinical Progress Notes      Specialized Services Documentation  Therapist must complete separate progress note for each specific clinical activity in which the individual participated during the day  This was not shared due to this is a psychotherapy note  GROUP PSYCHOTHERAPY ([id number]) Group was facilitated virtually in a private office using HIPAA Compliant and Approved [employer] Teams  Kaylie Moreno consented to the use of tele-video modality of treatment and was virtually present for group psychotherapy today  The group engaged in a discussion about strengths exploration  Members were asked to answer the following question:  1  Imagine a time you felt you were at your best  Describe what you were doing, and what about that situation made you feel confident  Compare this to a time when you felt uneasy, or a time you were not confident  What are the differences? Kaylie Moreno seemed very engaged throughout the group session and became tearful at times  She was willing to participate without prompting  Kaylie Moreno stated that a time they felt they were at their best was having her children  Kaylie Moreno is encouraged to make progress towards goals and objectives through group participation and will continue to attend psychotherapy group       Tx plan objective: 1 1, 1 2   Therapist: Flavia Maguire MA

## 2021-03-19 NOTE — PSYCH
This note was not shared with the patient due to this is a psychotherapy note    Subjective:     Patient ID: Ngoc Reagan is a 47 y o  female  [facility] Clinical Progress Notes      Specialized Services Documentation  Therapist must complete separate progress note for each specific clinical activity in which the individual participated during the day  Education Therapy   [id number] Ngoc Reagan actively shared in morning assessment and goal review  Presented as Receptive related to readiness to learn  Ngoc Reagan did complete goal from last treatment day identifying gaining  emotional wellness  did not present with any barriers to learning  Mariajose Johnson 1397 engaged throughout the treatment day  Was engaged in learning related to [employer]  Staff utilized Verbal, Written, A/V, and Demonstration teaching methods  Ngoc Reagan shared area of learning and set a goal for outside of program to get ready for son's bday        Tx Plan Objective: 1 4, Therapist:  Jose Jason RN
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
## 2021-03-19 NOTE — PSYCH
This note was not shared with the patient due to this is a psychotherapy note    Subjective:     Patient ID: Graciela Miles is a 47 y o  female  [facility] Clinical Progress Notes      Specialized Services Documentation  Therapist must complete separate progress note for each specific clinical activity in which the individual participated during the day  Group Psychotherapy Life Skills Group (10:25-11:10) Chaya Barrera actively engaged in group focused on the labels which others put on us and the labels we put on ourselves which was facilitated virtually in a private office using HIPAA Compliant and Approved [employer] Teams  Sylvia Bonilla consented to the use of tele-video modality of treatment and was virtually present for group psychotherapy today  During the exercise group members discussed the impact these labels have on their lives  The group explored which labels they want to get rid of and which they would like to believe more  Client stated that the negative label she would want to give up is  feeling bad about myself   The positive label she would like to believe is  I am strong and can become comfortable in my own skin We discussed how to get rid of the negative labels and how their lives would change if they stopped believing them  Sylvia Bonilla continues to make progress towards goals through verbal participation in group; to accomplish long term goals continue to utilize skills learned in programming  Continue with psychotherapy to educate and encourage use of wellness tools    Tx Plan Objective: 1 1,1 2 Therapist:  CARLOS Pickett

## 2021-03-22 ENCOUNTER — OFFICE VISIT (OUTPATIENT)
Dept: PSYCHOLOGY | Facility: CLINIC | Age: 41
End: 2021-03-22
Payer: COMMERCIAL

## 2021-03-22 DIAGNOSIS — F33.2 SEVERE EPISODE OF RECURRENT MAJOR DEPRESSIVE DISORDER, WITHOUT PSYCHOTIC FEATURES (HCC): Primary | ICD-10-CM

## 2021-03-22 PROCEDURE — S0201 PARTIAL HOSPITALIZATION SERV: HCPCS

## 2021-03-22 PROCEDURE — G0177 OPPS/PHP; TRAIN & EDUC SERV: HCPCS

## 2021-03-22 PROCEDURE — G0410 GRP PSYCH PARTIAL HOSP 45-50: HCPCS
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
## 2021-03-22 NOTE — PSYCH
This note was not shared with the patient due to this is a psychotherapy note    Subjective:     Patient ID: Ervin Kinsey is a 47 y o  female  [facility] Clinical Progress Notes      Specialized Services Documentation  Therapist must complete separate progress note for each specific clinical activity in which the individual participated during the day  This group was facilitated virtually in a private office using [employer] and Approved [employer] Teams  Ervin Kinsey consented to the use of tele-video modality of treatment  Group Psychotherapy     ([id number])  Ervin Kinsey actively participated in psychoeducational group nutrition to improve physical and mental wellbeing  Topics included appropriate serving sizes for all food groups as well as benefits to eating for health  Reviewed vitamins and nutrients most beneficial to mental health  Individual group members talked about areas that are hard for them and why they eat how they do  Group then discussed ideas on how to improve on their nutrition  Good progress toward goal noted  Continue psychoeducation to educate on the importance of making nutrition a priority      Tx Plan Objective: 1 3 Therapist:  Marlene Roy RN
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
## 2021-03-22 NOTE — PSYCH
Subjective:     Patient ID: Ruth Gunter is a 47 y o  female  [facility] Clinical Progress Notes      Specialized Services Documentation  Therapist must complete separate progress note for each specific clinical activity in which the individual participated during the day  This was not shared due to this is a psychotherapy note  GROUP PSYCHOTHERAPY ([id number]) Group was facilitated virtually in a private office using HIPAA Compliant and Approved [employer] Teams  Qi Wei consented to the use of tele-video modality of treatment and was virtually present for group psychotherapy today  The group engaged in education about the stages of change  Each member shared a personal example of a habit they struggle with and then stated which stage of change they are currently in  Members identified themselves in one of the following categories: pre-contemplation, contemplation, preparation, action, and maintenance  Qi Wei seemed very engaged throughout the group session and was willing to participate without prompting  Qi Wei stated that starting the partial program was the decision she struggled with  They identified with the preparation and action stages  Qi Wei is encouraged to make progress towards goals and objectives through group participation and will continue to attend psychotherapy group  Tx plan objective: 1 1, 1 2   Therapist: Darya Fernandes MA    Education Therapy   [id number] Ruth Gunter actively shared in morning assessment and goal review  Presented as Receptive related to readiness to learn  Ruth Gunter did complete goal from last treatment day identifying gaining the ability to make healthier choices  did not present with any barriers to learning  Mariajose Johnson 9251 engaged throughout the treatment day  Was engaged in learning related to Aftercare and Wellness Tools  Staff utilized Verbal and A/V teaching methods    Ruth Gunter shared area of learning and set a goal for outside of program to keep calm        Tx Plan Objective: 1 4, Therapist:  Chas Arguello MA

## 2021-03-22 NOTE — PSYCH
Subjective:     Patient ID: Tyshawn Lim is a 47 y o  female  [facility] Clinical Progress Notes      Specialized Services Documentation  Therapist must complete separate progress note for each specific clinical activity in which the individual participated during the day  This was not shared due to this is a psychotherapy note  GROUP PSYCHOTHERAPY ([id number]) Group was facilitated virtually in a private office using HIPAA Compliant and Approved [employer] Teams  Lazara Garrison consented to the use of tele-video modality of treatment and was virtually present for group psychotherapy today  The group engaged in psychoeducation about triggers  Members were asked to name one situation that triggered them recently  We used a handout that encourages members to engage in self-awareness by practicing the following self-reflection questions:  1  Why did this trigger you? 2  What was your reaction to your trigger? 3  What do you need to remind yourself to stay in control when confronted by your trigger? Lazara Garrison seemed very engaged throughout the group session and became tearful at times  She was willing to participate with minimal prompting and provide feedback to peers  Lazara Garrison stated that they were recently triggered by feeling as though she is not listened to or heard  Lazara Garrison is encouraged to make progress towards goals and objectives through group participation and will continue to attend psychotherapy group       Tx plan objective: 1 1, 1 2   Therapist: Azeem Brock MA

## 2021-03-23 ENCOUNTER — OFFICE VISIT (OUTPATIENT)
Dept: PSYCHOLOGY | Facility: CLINIC | Age: 41
End: 2021-03-23
Payer: COMMERCIAL

## 2021-03-23 ENCOUNTER — TELEMEDICINE (OUTPATIENT)
Dept: PSYCHIATRY | Facility: CLINIC | Age: 41
End: 2021-03-23
Payer: COMMERCIAL

## 2021-03-23 DIAGNOSIS — F33.2 SEVERE EPISODE OF RECURRENT MAJOR DEPRESSIVE DISORDER, WITHOUT PSYCHOTIC FEATURES (HCC): Primary | ICD-10-CM

## 2021-03-23 DIAGNOSIS — F41.1 GENERALIZED ANXIETY DISORDER: ICD-10-CM

## 2021-03-23 PROCEDURE — G0177 OPPS/PHP; TRAIN & EDUC SERV: HCPCS

## 2021-03-23 PROCEDURE — 99212 OFFICE O/P EST SF 10 MIN: CPT | Performed by: PHYSICIAN ASSISTANT

## 2021-03-23 PROCEDURE — S0201 PARTIAL HOSPITALIZATION SERV: HCPCS

## 2021-03-23 PROCEDURE — G0410 GRP PSYCH PARTIAL HOSP 45-50: HCPCS
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
## 2021-03-23 NOTE — PSYCH
This note was not shared with the patient due to this is a psychotherapy note    Subjective:     Patient ID: Ruth Gunter is a 47 y o  female  [facility] Clinical Progress Notes      Specialized Services Documentation  Therapist must complete separate progress note for each specific clinical activity in which the individual participated during the day  Group Psychotherapy Life Skills Group (10:25-11:10) Ruth Gunter actively engaged in group focused on understanding emotions and how they effect behavior which was facilitated virtually in a private office using HIPAA Compliant and Approved [employer] Teams  Qi Culpy consented to the use of tele-video modality of treatment and was virtually present for group psychotherapy today  During group we discussed what emotional intelligence is and how our emotions affect our behavior  Clients shared about an emotion that they struggle to accept or express  Clients described a situation that made them feel that way and explored what contributed to that feeling  Some feelings that were discussed were love, anger, anxiety, sadness, shame, and guilt  During exercise, Qi Wei identified that a coping behavior that could have helped the situation was talking and trying to practice forgiveness  Qi Wei continues to make progress towards goals through verbal participation in group; to accomplish long term goals continue to utilize skills learned in programming  Continue with psychotherapy to educate and encourage use of wellness tools    Tx Plan Objective: 1 1,1 2 Therapist:  CARLOS Andrade

## 2021-03-23 NOTE — PSYCH
This note was not shared with the patient due to this is a psychotherapy note    Subjective:     Patient ID: Cruz Hook is a 47 y o  female  [facility] Clinical Progress Notes      Specialized Services Documentation  Therapist must complete separate progress note for each specific clinical activity in which the individual participated during the day  Other: Today was not a scheduled CM meeting  Additionally, Cruz Hook did not request a meeting  Case Management Note    CARLOS Agosto    Current suicide risk : Unable to assess    n/a    Medications changes/added/denied? No    Treatment session number: 4    Individual Case Management Visit provided today?  No    [facility] follow up physician's orders: n/a

## 2021-03-23 NOTE — PSYCH
This note was not shared with the patient due to this is a psychotherapy note  Subjective:     Patient ID: Cristopher Ybarra is a 47 y o  female  [facility] Clinical Progress Notes      Specialized Services Documentation  Therapist must complete separate progress note for each specific clinical activity in which the individual participated during the day  Group Psychotherapy 9:30am-10:15am     This group was facilitated virtually in a private office using [employer] and Approved [employer] Teams  [place] [address] Meischeid  consented to the use of tele-video modality of treatment      Chaya Martinez participated in a group focused on strengths  Group began with a check in and worksheets shared that included a list of strengths that could be identified and exploration of how are strengths apply to personal fulfillment, relationships and professional settings  Mari Hernandez was engaged and participated in the group discussion, identifying strengths and how she is able to uses them   Mari Hernandez will continue to participate in group therapy and work towards her treatment plan goals       Tx Plan Objective: 1 2, Therapist:  Jerry Olivarez, NGOC, LSW
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
## 2021-03-24 ENCOUNTER — OFFICE VISIT (OUTPATIENT)
Dept: PSYCHOLOGY | Facility: CLINIC | Age: 41
End: 2021-03-24
Payer: COMMERCIAL

## 2021-03-24 DIAGNOSIS — F41.1 GENERALIZED ANXIETY DISORDER: ICD-10-CM

## 2021-03-24 DIAGNOSIS — F33.2 SEVERE EPISODE OF RECURRENT MAJOR DEPRESSIVE DISORDER, WITHOUT PSYCHOTIC FEATURES (HCC): Primary | ICD-10-CM

## 2021-03-24 PROCEDURE — G0177 OPPS/PHP; TRAIN & EDUC SERV: HCPCS

## 2021-03-24 PROCEDURE — S0201 PARTIAL HOSPITALIZATION SERV: HCPCS

## 2021-03-24 PROCEDURE — G0410 GRP PSYCH PARTIAL HOSP 45-50: HCPCS

## 2021-03-24 NOTE — PSYCH
This note was not shared with the patient due to this is a psychotherapy note     Subjective:     Patient ID: Autumn Danielle is a 47 y o  female  [facility] Clinical Progress Notes      Specialized Services Documentation  Therapist must complete separate progress note for each specific clinical activity in which the individual participated during the day  Group Psychotherapy Life Skills Group (11:30-12:15) Autumn Danielle actively engaged in group focused on creative stress reduction techniques which was facilitated virtually in a private office using HIPAA Compliant and Approved [employer] Teams  Kindra Chow consented to the use of tele-video modality of treatment and was virtually present for group psychotherapy today  Each individual in group created their own meditation scene and provided as much detail as possible while walking the group through that scene  Another activity that the group did was turning their scribbles into an image and they explored the message behind their image  The third exercise that the group did was drawing their breath to help mindfully connect to their body  The group members disclosed feelings that came up for them  Client stated that the exercise they would consider implementing is creating and thinking about her own meditation scene  Kindra Chow continues to make progress towards goals through verbal participation in group; to accomplish long term goals continue to utilize skills learned in programming  Continue with psychotherapy to educate and encourage use of wellness tools  Tx Plan Objective: 1 1,1 2 Therapist:  CARLOS Rahman

## 2021-03-24 NOTE — PSYCH
This note was not shared with the patient due to this is a psychotherapy note   Subjective:     Patient ID: Iraida Bolanos is a 47 y o  female  [facility] Clinical Progress Notes      Specialized Services Documentation  Therapist must complete separate progress note for each specific clinical activity in which the individual participated during the day  GROUP PSYCHOTHERAPY ([id number]) Group was facilitated virtually in a private office using HIPAA Compliant and Approved [employer] Teams  Iraida Bolanos consented to the use of tele-video modality of treatment and was virtually present for group psychotherapy today  The group members received a safe and non-judgmental space to discuss current stressors and received feedback from the group  The group discussions involved grief and loss, core beliefs and boundary setting  Kimberly Macias demonstrates insight through verbal participation in group  Continue with psychotherapy     TX Plan Objectives: 1 1, 1 2, 1 4   Therapist: Pat Noe MA, [place]
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
## 2021-03-25 ENCOUNTER — OFFICE VISIT (OUTPATIENT)
Dept: PSYCHOLOGY | Facility: CLINIC | Age: 41
End: 2021-03-25
Payer: COMMERCIAL

## 2021-03-25 DIAGNOSIS — F41.1 GENERALIZED ANXIETY DISORDER: ICD-10-CM

## 2021-03-25 DIAGNOSIS — F33.2 SEVERE EPISODE OF RECURRENT MAJOR DEPRESSIVE DISORDER, WITHOUT PSYCHOTIC FEATURES (HCC): Primary | ICD-10-CM

## 2021-03-25 PROCEDURE — G0410 GRP PSYCH PARTIAL HOSP 45-50: HCPCS

## 2021-03-25 PROCEDURE — G0177 OPPS/PHP; TRAIN & EDUC SERV: HCPCS

## 2021-03-25 PROCEDURE — S0201 PARTIAL HOSPITALIZATION SERV: HCPCS

## 2021-03-25 NOTE — PSYCH
Subjective:     Patient ID: Ngoc Reagan is a 47 y o  female  [facility] Clinical Progress Notes      Specialized Services Documentation  Therapist must complete separate progress note for each specific clinical activity in which the individual participated during the day  This was not shared due to this is a psychotherapy note  GROUP PSYCHOTHERAPY ([id number]) Group was facilitated virtually in a private office using HIPAA Compliant and Approved [employer] Teams  Danis Smith consented to the use of tele-video modality of treatment and was virtually present for group psychotherapy today  The group received education about the cycle of anger  The group was then asked the following questions:  - Name two triggers that make you angry quickly  - What are your emotional responses? - What are your physical responses? Danis Smith seemed very engaged throughout the group session and was tearful at times  Danis Smith stated that one situation that triggers their anger is thinking about golf since it brings up negative memories  Danis Smith is encouraged to make progress towards goals and objectives through group participation and will continue to attend psychotherapy group  Tx plan objective: 1 1, 1 2   Therapist: Neno Abebe MA    Education Therapy   [id number] Ngoc Reagan actively shared in morning assessment and goal review  Presented as Receptive related to readiness to learn  Ngoc Reagan did complete goal from last treatment day identifying gaining advocacy  did not present with any barriers to learning  Mariajose Johnson [id number] engaged throughout the treatment day  Was engaged in learning related to Aftercare and Wellness Tools  Staff utilized Verbal and A/V teaching methods  Ngoc Reagan shared area of learning and set a goal for outside of program to practice identifying her levels of anger        Tx Plan Objective: 1 4, Therapist:  Neno Abebe MA
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
## 2021-03-25 NOTE — PSYCH
Subjective:     Patient ID: Jalen Weldon is a 47 y o  female  [facility] Clinical Progress Notes      Specialized Services Documentation  Therapist must complete separate progress note for each specific clinical activity in which the individual participated during the day  This group was facilitated virtually in a private office using [employer] and Approved [employer] Teams  Jalen Weldon consented to the use of tele-video modality of treatment  This note was not shared with the patient due to this is a psychotherapy note      Group Psychotherapy     ([id number]) Jalen Weldon  attentively listened to [facility] share his life story as he co-led this session  Group encouraged power of learning about self, accepting illness and personal responsibility in recovery  Community resources reviewed in addition to personal resources like the affirmations  Progress toward goals noted    Continue psychotherapy     Tx Plan Objective: 1 4 Therapist:  Karson Wiseman RN

## 2021-03-25 NOTE — PSYCH
This note was not shared with the patient due to this is a psychotherapy note    Subjective:     Patient ID: Mago Grady is a 47 y o  female  [facility] Clinical Progress Notes      Specialized Services Documentation  Therapist must complete separate progress note for each specific clinical activity in which the individual participated during the day  Group Psychotherapy [id number]    This group was facilitated virtually in a private office using [employer] and Approved [employer] Teams  Mago Grady consented to the use of tele-video modality of treatment  Psychotherapy group focused on setting and enforcing healthy boundaries  Emotional and physical boundaries were reviewed and the group explored why boundary violation is a common issue  Mago Grady did not share during group, however, she provided appropriate feedback to her peers  explored ways to set personal boundaries  Personal bill of rights reviewed  Some progress toward goals  Continue psychotherapy group to explore stressors and ways of coping   Tx Plan Objective: 1 1, 1 4 Therapist:  CARLOS Lau      Other: Today is not a scheduled CM meeting day  Additionally, Mago Grady did not request a meeting  Next scheduled CM meeting 03/26/2021 at 1300 to review and update treatment plan  Case Management Note    CARLOS Lau    Current suicide risk : Unable to assess    n/a    Medications changes/added/denied? No    Treatment session number: 6    Individual Case Management Visit provided today?  No    [facility] follow up physician's orders: n/a

## 2021-03-26 ENCOUNTER — DOCUMENTATION (OUTPATIENT)
Dept: PSYCHOLOGY | Facility: CLINIC | Age: 41
End: 2021-03-26

## 2021-03-26 ENCOUNTER — OFFICE VISIT (OUTPATIENT)
Dept: PSYCHOLOGY | Facility: CLINIC | Age: 41
End: 2021-03-26
Payer: COMMERCIAL

## 2021-03-26 DIAGNOSIS — F33.2 SEVERE EPISODE OF RECURRENT MAJOR DEPRESSIVE DISORDER, WITHOUT PSYCHOTIC FEATURES (HCC): Primary | ICD-10-CM

## 2021-03-26 DIAGNOSIS — F41.1 GENERALIZED ANXIETY DISORDER: ICD-10-CM

## 2021-03-26 PROCEDURE — G0410 GRP PSYCH PARTIAL HOSP 45-50: HCPCS

## 2021-03-26 PROCEDURE — S0201 PARTIAL HOSPITALIZATION SERV: HCPCS

## 2021-03-26 PROCEDURE — G0177 OPPS/PHP; TRAIN & EDUC SERV: HCPCS

## 2021-03-26 NOTE — PSYCH
This note was not shared with the patient due to this is a psychotherapy note    Subjective:     Patient ID: Autumn Danielle is a 47 y o  female  [facility] Clinical Progress Notes      Specialized Services Documentation  Therapist must complete separate progress note for each specific clinical activity in which the individual participated during the day  Other: ([id number]) Reviewed while online during TEAMS case management session - Kindra Barrera agreeable to updates and 1 5 goal -3/26/2021 at 1530     Case Management Note    CARLOS Wilkinson    Current suicide risk : [address] denies SI, plan or intent    Medications changes/added/denied? No    Treatment session number: 7    Individual Case Management Visit provided today?  Yes     [facility] follow up physician's orders: n/a
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
## 2021-03-26 NOTE — PSYCH
This note was not shared with the patient due to this is a psychotherapy note    Subjective:     Patient ID: Florence Mahmood is a 47 y o  female  [facility] Clinical Progress Notes      Specialized Services Documentation  Therapist must complete separate progress note for each specific clinical activity in which the individual participated during the day  Group Psychotherapy     ([id number]) Florence Mahmood actively shared in psychotherapy group which focused on Weekly Wellness Assessment  Theyengaged in self-rate and discussion  Group members individually went through the assessment and rated themselves based on the past week  Group members shared assessment results  Group discussed the six domains of wellness; physical, emotional, cognitive, vocational, social, and spiritual  Group discussed strengths, challenges, barriers, and ways to increase each domain in and open forum and developed goals  Good progress towards goal observed and shared  Continue psychotherapy to further encourage self-reflection on strengths and challenges with personal wellness  Tx Plan Objective:  1 1,1 2,1 3,1 4 Therapist:  Charlie Cline RN;      Education Therapy   [id number] Florence Mahmood actively shared in morning assessment and goal review  Presented as Receptive related to readiness to learn  Florence Mahmood did complete goal from last treatment day identifying gaining  emotional wellness  did not present with any barriers to learning  Mariajose Johnson 1397 engaged throughout the treatment day  Was engaged in learning related to [employer]  Staff utilized Verbal, Written, A/V, and Demonstration teaching methods  Florence aMhmood shared area of learning and set a goal for outside of program to recognize anxiety before it turns into anger        Tx Plan Objective: 1 4, Therapist:  Charlie Cline RN

## 2021-03-26 NOTE — PSYCH
This note was not shared with the patient due to this is a psychotherapy note    Subjective:     Patient ID: Ngoc Reagan is a 47 y o  female  [facility] Clinical Progress Notes      Specialized Services Documentation  Therapist must complete separate progress note for each specific clinical activity in which the individual participated during the day  Group Psychotherapy Life Skills Group (10:25-11:10) Danis Sarah Barrera actively engaged in group focused on recognizing accomplishments which was facilitated virtually in a private office using HIPAA Compliant and Approved [employer] Teams  Danis Smith consented to the use of tele-video modality of treatment and was virtually present for group psychotherapy today  During group we discussed the accomplishments that they achieved today  We explored the reasons why it is so difficult to acknowledge those accomplishments  Group members shared what their proudest accomplishment is  Client stated that she has many proud accomplishments they are getting ,having kids, and owning a home but her proudest accomplishment was attending this program   We discussed why it is important to recognize the accomplishments no matter how big or small they seem  Danis Smith continues to make progress towards goals through verbal participation in group; to accomplish long term goals continue to utilize skills learned in programming  Continue with psychotherapy to educate and encourage use of wellness tools   Tx Plan Objective: 1 1,1 2 Therapist:  CARLOS Carr

## 2021-03-26 NOTE — PSYCH
Assessment/Plan:      Severe episode of recurrent major depressive disorder, without psychotic features     Generalized anxiety disorder     Subjective:      Patient ID: Monserrat Mcknight is a 47 y o  female      [facility] Treatment Plan     AREAS OF NEED: Depression as evidenced by; excessive tearfulness, frequent crying spells, lack of motivation, hopelessness,      Date Initiated: 03/17/2021     Strengths: Supportive mother and , loves her children        LONG TERM GOAL:      Date Initiated: 03/17/2021     1 0  To decrease depressive symptoms by implementing effective coping skills, from baseline 8/10, to 5/10     Target Date: 04/28/2021     Completion Date:            SHORT TERM OBJECTIVES:      Date Initiated: 03/17/2021     1 1 I will identify 3 positive affirmations I can begin to recite to increase my own confidence and utilize them as I complete at least 1 task daily       Revision Date: 03/26/2021     Target Date: 03/26/2021     Completion Date:         Date Initiated: 03/17/2021     1 2 I will identify 3 things I need to do to take care of MYSELF on a daily basis and begin to do them     Revision Date: 03/26/2021     Target Date: 03/26/2021     Completion Date:         Date Initiated: 03/17/2021     1 3 I will take medications as prescribed and share questions and concerns if arise       Revision Date: 03/26/2021     Target Date: 03/26/2021     Completion Date:         Date Initiated: 03/17/2021     1 4 I will identify 3 ways my supports can assist in my recovery and agree to staff/support contact as indicated       Revision Date: 03/26/2021     Target Date: 03/26/2021     Completion Date:                7 DAY REVISION:     Date Initiated: 03/26/2021    1 5 I will identify 3 skills I am learning in program to manage my anxiety in the moment and consistently use to support my ongoing wellness    Revision Date:     Target Date: 04/05/2021    Completion Date:           PSYCHIATRY:  Date Initiated: 03/17/2021     Medication management and education     Revision Date: 03/26/2021     The person(s) responsible for carrying out the plan is Dr Nhi Easley MD        NURSING:   Date Initiated: 03/17/2021     1 1,1 2,1 4 RN will provide daily wellness group five days weekly to [address] [place] [address] on S/S of her diagnosis and medications used in treatment  1 1,1 2,1 3,1 4,1 5 RN will continue to provide daily wellness group five days weekly to educate Nikita Carmona on her S/S of his diagnosis and medications used in treatment       Revision Date: 03/26/2021         The person(s) responsible for carrying out the plan is Calista Vásquez RN        PSYCHOLOGY:   Date Initiated: 03/17/2021     1 1,1 2,1 4 Provide psychotherapy group five times per week to allow opportunity for Chaya Grimmnathanjennifer to explore stressors and ways of coping  1 1,1 2,1 4,Continue to provide psychotherapy group three times per week to allow opportunity for Nikita Carmona and encourage sharing of stressors, skills and positive change      Revision Date: 03/26/2021     The person(s) responsible for carrying out the plan is DINO Polanco and Braulio Garcia        ALLIED THERAPY:   Date Initiated: 03/17/2021     Revision Date:      1 1,1 2 Engage Chaya Barrera in AT group five times weekly to encourage development and use of wellness tools to decrease symptoms and promote recovery through meaningful activity      The person(s) responsible for carrying out the plan is HERLINDA Aguila        CASE MANAGEMENT:   Date Initiated: 03/17/2021     Revision Date: 03/26/2021         1 0 Continue to engage in individual case management 3-4 times weekly, to discuss and        prepare for aftercare services, discuss progress and other community resources   UR contact    as necessary      The person(s) responsible for carrying out the plan is DINO Polanco        TREATMENT REVIEW/COMMENTS:      DISCHARGE CRITERIA: Identify 3 signs of progress and complete relapse prevention plan       DISCHARGE PLAN: OP therapy     Estimated Length of Stay: 10 treatment days               Diagnosis and Treatment Plan explained to Mikhail Flores relates understanding diagnosis and is agreeable to Treatment Plan             CLIENT COMMENTS / Please share your thoughts, feelings, need and/or experiences regarding your treatment plan: _____________________________________________________________________________________________________________________________________________________________________________________________________________________________________________________________________________________________________________________ Date/Time: ______________      Patient Signature: _________________________________

## 2021-03-29 ENCOUNTER — TELEMEDICINE (OUTPATIENT)
Dept: PSYCHIATRY | Facility: CLINIC | Age: 41
End: 2021-03-29
Payer: COMMERCIAL

## 2021-03-29 ENCOUNTER — OFFICE VISIT (OUTPATIENT)
Dept: PSYCHOLOGY | Facility: CLINIC | Age: 41
End: 2021-03-29
Payer: COMMERCIAL

## 2021-03-29 DIAGNOSIS — F33.2 SEVERE EPISODE OF RECURRENT MAJOR DEPRESSIVE DISORDER, WITHOUT PSYCHOTIC FEATURES (HCC): Primary | ICD-10-CM

## 2021-03-29 DIAGNOSIS — F41.1 GENERALIZED ANXIETY DISORDER: ICD-10-CM

## 2021-03-29 PROCEDURE — 99212 OFFICE O/P EST SF 10 MIN: CPT | Performed by: PHYSICIAN ASSISTANT

## 2021-03-29 PROCEDURE — S0201 PARTIAL HOSPITALIZATION SERV: HCPCS

## 2021-03-29 PROCEDURE — G0410 GRP PSYCH PARTIAL HOSP 45-50: HCPCS

## 2021-03-29 PROCEDURE — G0177 OPPS/PHP; TRAIN & EDUC SERV: HCPCS

## 2021-03-29 PROCEDURE — 1036F TOBACCO NON-USER: CPT | Performed by: PHYSICIAN ASSISTANT

## 2021-03-29 PROCEDURE — G0176 OPPS/PHP;ACTIVITY THERAPY: HCPCS

## 2021-03-29 NOTE — PSYCH
This note was not shared with the patient due to this is a psychotherapy note    Subjective:     Patient ID: Iraida Bolanos is a 47 y o  female  [facility] Clinical Progress Notes      Specialized Services Documentation  Therapist must complete separate progress note for each specific clinical activity in which the individual participated during the day  Group Psychotherapy     ([id number]) Iraida Bolanos present in nurse education group this morning which focused on relapse prevention  Group shared personal symptoms/warning signs of relapse, what they tend to do when these symptoms/signs are present, what they plan to do in the future, and how a support can help  They then discussed the importance of sharing these symptoms/warning signs with a current support  Group members then discussed ways they can prevent relapse  Members were sent a blank relapse prevention plan  Kimberly Macias shared ways she can detect when something isn't right and who she can go to for help  Good progress toward goal noted  Continue nurse education group to educate on the importance of recognizing signs of relapse, developing a plan, and seeking support in effort to prevent a crisis from occurring             Tx Plan Objective:  1 1,1 2,1 3,1 4 Therapist:  Cheryle Donahue RN;
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
## 2021-03-29 NOTE — PSYCH
This note was not shared with the patient due to this is a psychotherapy note       Subjective:     Patient ID: Darin Ovalles is a 47 y o  female  [facility] Clinical Progress Notes      Specialized Services Documentation  Therapist must complete separate progress note for each specific clinical activity in which the individual participated during the day  Allied Therapy   This group was facilitated virtually in a private office using [employer] and Approved [employer] Teams  Darin Ovalles consented to the use of tele-video modality of treatment  [id number]  Darin Ovalles  actively shared in [facility] group focused on mindfulness strategies  Lyndsay Maxwell engaged in [address] and breathing techniques to review the what skills of observe, describe and participate  Group introduced to and practiced the [facility] skills non-judgmental, one-mindfully, and effectiveness through various tasks  She identified she could practice mindfulness while eating dinner this evening  Group discussed ways to apply to slowing down to make more effective choices  Some positive effort noted toward treatment goal   Continue AT to encourage the development and practice of mindfulness strategies to alleviate symptoms and support wellness        Tx Plan Objective: 1 1, Therapist:  Marylen Brinks MT-BC

## 2021-03-29 NOTE — PSYCH
This note was not shared with the patient due to this is a psychotherapy note    Subjective:     Patient ID: Blas King is a 47 y o  female  [facility] Clinical Progress Notes      Specialized Services Documentation  Therapist must complete separate progress note for each specific clinical activity in which the individual participated during the day  Other: Today was not a scheduled CM meeting day  Additionally, Blas King did not request a meeting  Next CM meeting 03/30/2021 at 1500  Case Management Note    CARLOS Sutton    Current suicide risk : Unable to assess    n/a    Medications changes/added/denied? Yes- As per Ernestina Alva PA-C: Planned medication and treatment changes: Cont present meds wellbutrin xl 300 mg/d and buspar 10 mg tid       All current active medications have been reviewed  Continue treatment with group therapy and partial program       Treatment session number: 8    Individual Case Management Visit provided today?  No    [facility] follow up physician's orders: n/a

## 2021-03-29 NOTE — PSYCH
Subjective:     Patient ID: Ruth Gunter is a 47 y o  female  [facility] Clinical Progress Notes      Specialized Services Documentation  Therapist must complete separate progress note for each specific clinical activity in which the individual participated during the day  This was not shared due to this is a psychotherapy note  GROUP PSYCHOTHERAPY ([id number]) Group was facilitated virtually in a private office using HIPAA Compliant and Approved Microsoft Teams  Qi Wei consented to the use of tele-video modality of treatment and was virtually present for group psychotherapy today  The group engaged in psychoeducation about how to challenge their worries  The group focused on answering the following questions:  1  What is something you are worried about? 2  Will the outcome of this situation have a long-term effect on me? Yes or no?  - If yes, in what way? - If no, then why am I worried? 3  Do I have control over the outcome? Yes or No  - If yes, what can I do? Am I doing it? If so, is it helping reducing my anxiety?  - If not, does it pay to worry? Qi Wei seemed very engaged throughout the group session and was willing to participate and provide feedback to peers  Qi Wei stated that they are currently worried about finances and purchasing a new car  Qi Wei is encouraged to make progress towards goals and objectives through group participation and will continue to attend psychotherapy group  Tx plan objective: 1 1, 1 2   Therapist: Darya Fernandes MA    Education Therapy   [id number] Ruth Gunter actively shared in morning assessment and goal review  Presented as Receptive related to readiness to learn  Ruth Gunter did complete goal from last treatment day identifying gaining a peaceful environment  did not present with any barriers to learning  Mariajose Johnson [id number] engaged throughout the treatment day  Was engaged in learning related to Illness, Aftercare and Wellness Tools   Staff utilized Verbal and A/V teaching methods  [address] shared area of learning and set a goal for outside of program to not let her anxiety interfere with her working        Tx Plan Objective: 1 4, Therapist:  Jayden Carbajal MA

## 2021-03-30 ENCOUNTER — OFFICE VISIT (OUTPATIENT)
Dept: PSYCHOLOGY | Facility: CLINIC | Age: 41
End: 2021-03-30
Payer: COMMERCIAL

## 2021-03-30 DIAGNOSIS — F41.1 GENERALIZED ANXIETY DISORDER: ICD-10-CM

## 2021-03-30 DIAGNOSIS — F33.2 SEVERE EPISODE OF RECURRENT MAJOR DEPRESSIVE DISORDER, WITHOUT PSYCHOTIC FEATURES (HCC): Primary | ICD-10-CM

## 2021-03-30 PROCEDURE — G0410 GRP PSYCH PARTIAL HOSP 45-50: HCPCS

## 2021-03-30 PROCEDURE — S0201 PARTIAL HOSPITALIZATION SERV: HCPCS

## 2021-03-30 PROCEDURE — G0177 OPPS/PHP; TRAIN & EDUC SERV: HCPCS

## 2021-03-30 NOTE — PSYCH
This note was not shared with the patient due to this is a psychotherapy note    Subjective:     Patient ID: Nikita Carmona is a 47 y o  female  [facility] Clinical Progress Notes      Specialized Services Documentation  Therapist must complete separate progress note for each specific clinical activity in which the individual participated during the day  Group Psychotherapy [id number]    This group was facilitated virtually in a private office using [employer] and Approved Microsoft Teams  Nikita Carmona consented to the use of tele-video modality of treatment  Psychotherapy group focused on reframing unhealthy thinking habits  The group began with a check in    Group members watched the [address], The Secret of Becoming Mentally Strong  Discussion took place where group members were encouraged to reflect on what negative mental habits are holding him/her back, what unhelpful beliefs are keeping him/her from being as mentally strong as he/she can be, and what is one small step he/she can begin to take today  Nikita Carmona shared that she struggles with comparing herself to others, and often feels that her life is unfair  She shared how she will be able to apply what she learned and implement how she can re-frame her thinking and to be more grateful for what she has  Positive progress toward goals  Nikita Carmona is encouraged to continue to make progress towards goals and objectives through group participation and will continue to attend psychotherapy group  Tx Plan Objective: 1 1,1 5, Therapist:  CARLOS Polanco    Education Therapy   [id number] Nikita Carmona actively shared in morning assessment and goal review  Presented as Receptive related to readiness to learn  Nikita Carmona did complete goal from last treatment day identifying gaining time spent with her dog  did not present with any barriers to learning       Mariajose Carreno engaged throughout the treatment day  Was engaged in learning related to [employer]  Staff utilized Verbal and A/V teaching methods  [address] shared area of learning and set a goal for outside of program to contact previous clients to schedule appointments   Tx Plan Objective: 1 1,1 2, Therapist:  CARLOS Latham    Other: [id number] This writer met with Margaret Mora for Tomasz Rodriguez  Discussed aftercare providers  [address] a few provides that she found and that she plans to contact them by next meeting on Thursday 04/01/2021  This writer will also obtained provider information to begin contacting as well to inquire about aftercare services  [address] denies SI, HI, and psychosis  Case Management Note    CARLOS Latham    Current suicide risk : [address] denies SI, plan or intent    Medications changes/added/denied? No    Treatment session number: 9    Individual Case Management Visit provided today?  Yes     [facility] follow up physician's orders: n/a
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
## 2021-03-30 NOTE — PSYCH
This note was not shared with the patient due to this is a psychotherapy note    Subjective:     Patient ID: Monserrat Mcknight is a 47 y o  female  [facility] Clinical Progress Notes      Specialized Services Documentation  Therapist must complete separate progress note for each specific clinical activity in which the individual participated during the day  Group Psychotherapy     ([id number]) Monserrat Mcknight present in educational group about coping skills  Group first discussed adaptive vs maladaptive skills and active vs avoidant skills  Group discussed the benefit of positively coping with stressors for mental, emotional and physical well being  Together, the group then formulated a list of A to Z coping skills, which the writer sent at the end of the group  Monserrat Mcknight participated throughout  Good progress towards goal observed  Continue psychoeducational groups to teach the benefits of adaptive coping skills         Tx Plan Objective:  1 1,1 2,1 3,1 4 Therapist:  Venkata Fonseca RN;

## 2021-03-30 NOTE — PSYCH
Subjective:     Patient ID: Iraida Bolanos is a 47 y o  female  [facility] Clinical Progress Notes      Specialized Services Documentation  Therapist must complete separate progress note for each specific clinical activity in which the individual participated during the day  This was not shared due to this is a psychotherapy note  Group Psychotherapy ([id number]) Group was facilitated virtually in a private office using HIPAA Compliant and Approved [employer] Teams  Kimberly Macias consented to the use of tele-video modality of treatment and was virtually present for group psychotherapy today  The group was educated on the different types of communication, which include passive, aggressive, and assertive  The group shared one example of how they have engaged in either passive or aggressive communication and then discussed one way to reframe the example into an assertive statement  Kimberly Macias seemed fairly engaged throughout the group session and was willing to participate without prompting  Chayas example was: "I'd always rather help others than help myself, so I'm generally passive, but I have noticed that I am aggressive towards my    Kimberly Macias is encouraged to make progress towards goals and objectives through group participation and will continue to attend psychotherapy group      Tx plan objective: 1 1, 1 2   Therapist: Marilyn Doss MA

## 2021-03-31 ENCOUNTER — OFFICE VISIT (OUTPATIENT)
Dept: PSYCHOLOGY | Facility: CLINIC | Age: 41
End: 2021-03-31
Payer: COMMERCIAL

## 2021-03-31 DIAGNOSIS — F41.1 GENERALIZED ANXIETY DISORDER: ICD-10-CM

## 2021-03-31 DIAGNOSIS — F33.2 SEVERE EPISODE OF RECURRENT MAJOR DEPRESSIVE DISORDER, WITHOUT PSYCHOTIC FEATURES (HCC): Primary | ICD-10-CM

## 2021-03-31 PROCEDURE — S0201 PARTIAL HOSPITALIZATION SERV: HCPCS

## 2021-03-31 PROCEDURE — G0410 GRP PSYCH PARTIAL HOSP 45-50: HCPCS

## 2021-03-31 PROCEDURE — G0177 OPPS/PHP; TRAIN & EDUC SERV: HCPCS

## 2021-03-31 NOTE — PSYCH
This note was not shared with the patient due to this is a psychotherapy note    Subjective:     Patient ID: Glendy Mercedes is a 47 y o  female  [facility] Clinical Progress Notes      Specialized Services Documentation  Therapist must complete separate progress note for each specific clinical activity in which the individual participated during the day  Group Psychotherapy [id number]    This group was facilitated virtually in a private office using [employer] and Approved Microsoft Teams  Glendy Mercedes consented to the use of tele-video modality of treatment  Psychotherapy group focused on building self esteem  Glendy Mercedes was educated on the correlation between low self esteem and anxiety, depression, poor relationships, and addiction  Different wellness tools to improve self esteem were reviewed includin) managing your inner critic  2) focusing on what is going well for you  3) aiming for effort rather than perfection  4) viewing mistakes as learning opportunities  5) editing  thoughts that get you to feel inferior  6) reminding yourself that everyone excels at different things  7) trying new things and giving yourself credit  8) recognizing what you can change and what you cant  9) setting goals  10)  taking pride in your opinions and ideas  11)  accepting compliments  12)  making a contribution  Glendy Mercedes was provided a list of positive affirmations to begin incorporating into her day to day routine  Through group discussion, she shared that one way she can begin improving her self esteem is by utilizing positive affirmations daily by reciting them in the morning before she starts her day, and at night  Positive progress toward goals  Glendy Mercedes is encouraged to continue to make progress towards goals and objectives through group participation and will continue to attend psychotherapy group          Tx Plan Objective: 1 1,1 2,1 5, Therapist:  Genesis Perrin CARLOS Mullins    Other: Today is not a scheduled CM meeting day  Additionally, Evelyn Mesa did not request a CM meeting  Next CM meeting scheduled for 04/01/2021 at 1245  Case Management Note    CARLOS Onofre    Current suicide risk : Unable to assess    n/a    Medications changes/added/denied? No    Treatment session number: 10    Individual Case Management Visit provided today?  No    [facility] follow up physician's orders: n/a

## 2021-03-31 NOTE — PSYCH
Subjective:     Patient ID: Cristopher Ybarra is a 47 y o  female  [facility] Clinical Progress Notes      Specialized Services Documentation  Therapist must complete separate progress note for each specific clinical activity in which the individual participated during the day  This was not shared due to this is a psychotherapy note  GROUP PSYCHOTHERAPY ([id number]) Group was facilitated virtually in a private office using HIPAA Compliant and Approved Microsoft Teams  Yvrose Gomez consented to the use of tele-video modality of treatment and was virtually present for group psychotherapy today  The group engaged in education and discussion about the tasks of mourning (grief): accepting the reality of the loss, processing the pain of grief, adjusting to a different world, and finding a way to remember the loss while moving forward  Each member was asked the following questions:  1  Which task do you find to be the most difficult? Why?  2  Which task do you currently identify with the most?    Mari Hernandez seemed very engaged throughout the session and was willing to participate without prompting  She became tearful at times  Mari Hernandez stated that the task they found most difficult was both processing the pain of grief and adjusting a different world  Mari Hernandez is encouraged to make progress towards goals and objectives through group participation and will continue to attend psychotherapy group  Tx plan objective: 1 1, 1 2   Therapist: Kristin Mckenzie MA    Education Therapy   [id number] Cristopher Ybarra actively shared in morning assessment and goal review  Presented as Receptive related to readiness to learn  Cristopher Ybarra did complete goal from last treatment day identifying gaining a sense of accountability  did not present with any barriers to learning  Mariajose Johnson [phone] engaged throughout the treatment day  Was engaged in learning related to Illness, Aftercare and Wellness Tools   Staff utilized Verbal and A/V teaching methods  [address] shared area of learning and set a goal for outside of program to manage her anxiety while car shopping        Tx Plan Objective: 1 4, Therapist:  Flavia Maguire MA

## 2021-03-31 NOTE — PSYCH
This note was not shared with the patient due to this is a psychotherapy note    Subjective:     Patient ID: Sharee Fields is a 47 y o  female  [facility] Clinical Progress Notes      Specialized Services Documentation  Therapist must complete separate progress note for each specific clinical activity in which the individual participated during the day  Group Psychotherapy Life Skills Group (11:30-12:15) Sharee Fields actively engaged in group focused on their personal narrative using the tree of life tool which was facilitated virtually in a private office using HIPAA Compliant and Approved [employer] Teams  Jerod Peñaloza consented to the use of tele-video modality of treatment and was virtually present for group psychotherapy today  The group created their own tree of life which represents who they are  They had to write words that answered the questions about each part of the tree which represents them  Clients identified what they learned by doing this activity   Client recognized that she needs to accept herself and not care about what others think in order to grow  Jerod Peñaloza continues to make progress towards goals through verbal participation in group; to accomplish long term goals continue to utilize skills learned in programming  Continue with psychotherapy to educate and encourage use of wellness tools   Tx Plan Objective: 1 1,1 2 Therapist:  CARLOS Bear
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
## 2021-04-01 ENCOUNTER — OFFICE VISIT (OUTPATIENT)
Dept: PSYCHOLOGY | Facility: CLINIC | Age: 41
End: 2021-04-01
Payer: COMMERCIAL

## 2021-04-01 DIAGNOSIS — F41.1 GENERALIZED ANXIETY DISORDER: ICD-10-CM

## 2021-04-01 DIAGNOSIS — F33.2 SEVERE EPISODE OF RECURRENT MAJOR DEPRESSIVE DISORDER, WITHOUT PSYCHOTIC FEATURES (HCC): Primary | ICD-10-CM

## 2021-04-01 PROCEDURE — G0410 GRP PSYCH PARTIAL HOSP 45-50: HCPCS

## 2021-04-01 PROCEDURE — S0201 PARTIAL HOSPITALIZATION SERV: HCPCS

## 2021-04-01 PROCEDURE — G0177 OPPS/PHP; TRAIN & EDUC SERV: HCPCS

## 2021-04-01 NOTE — PSYCH
This note was not shared with the patient due to this is a psychotherapy note    Subjective:     Patient ID: Lana Fischer is a 47 y o  female  [facility] Clinical Progress Notes      Specialized Services Documentation  Therapist must complete separate progress note for each specific clinical activity in which the individual participated during the day  Group Psychotherapy Life Skills Group (11:30-12:15)  Lana Fischer actively engaged in group focused on character strengths and how to spot strengths in ourselves and others  Group was facilitated virtually in a private office using HIPAA Compliant and Approved [employer] Teams  The group discussed why it is important to be able to identify and spot character strengths  During the activity group members were randomly selected to answer several questions in regards to their own character strengths and character strengths of others that they know  Joan Espino responded to one of the questions by stating that others shared the positive quality about her of having a big heart and being genuine and they noticed those strengths in her when she is being raw with emotions  Lana Fischer continues to make progress towards goals through verbal participation in group; to accomplish long term goals continue to utilize skills learned in programming  Continue with psychotherapy to educate and encourage use of wellness tools   Tx Plan Objective: 1 1,1 2 Therapist:  CARLOS Thurman

## 2021-04-01 NOTE — PSYCH
This note was not shared with the patient due to this is a psychotherapy note    Subjective:     Patient ID: Rachna Hubbard is a 47 y o  female  [facility] Clinical Progress Notes      Specialized Services Documentation  Therapist must complete separate progress note for each specific clinical activity in which the individual participated during the day  Group Psychotherapy     ([id number]) Rachna Hubbard present in wellness group today on Bipolar disorder  Group started with an Educational Video entitled - [facility]  Discussion  started off with definition and signs and symptoms of tiny vs depression  The group discussed resources available to those with mental illness  The group also discussed relapse prevention  The necessity of education, therapy and medication management was enforced  Information provided on the resources available to treat this disease  Support groups like [facility] ([address] [place] [address] Avenue) is a great tool available for people diagnosed with this illness, families and friends  Good progress towards goal noted  Continue wellness groups in order to educate on various illnesses and the importance of relapse prevention        Tx Plan Objective:  1 1,1 2,1 3,1 4 Therapist:  Rosario Villatoro RN;
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
## 2021-04-01 NOTE — PSYCH
This note was not shared with the patient due to this is a psychotherapy note    Subjective:     Patient ID: Cristina Suresh is a 47 y o  female  [facility] Clinical Progress Notes      Specialized Services Documentation  Therapist must complete separate progress note for each specific clinical activity in which the individual participated during the day  Group Psychotherapy [id number]    This group was facilitated virtually in a private office using [employer] and [employer]ved [employer] Teams  Cristina Suresh consented to the use of tele-video modality of treatment  Open psychotherapy group held which focused on processing emotions through [employer] journey and building motivation  Cristina Suresh did not share much in group, however, she did provided peers with positive support and feedback  Positive progress toward goals  Cristina Suresh is encouraged to continue to make progress towards goals and objectives through group participation and will continue to attend psychotherapy group  Tx Plan Objective: 1 1,1 4, Therapist:  CARLOS Scott      Education Therapy   [id number] Cristina Suresh actively shared in morning assessment and goal review  Presented as Receptive related to readiness to learn  Cristina Suresh did complete goal from last treatment day identifying gaining less anxiety  did not present with any barriers to learning  Mariajose Johnson [id number] engaged throughout the treatment day  Was engaged in learning related to [employer]  Staff utilized Verbal and A/V teaching methods  Cristina Suresh shared area of learning and set a goal for outside of program to get her hair done and enjoy the self-care  Tx Plan Objective: 1 2, Therapist:  CARLOS Scott    Other: Today was not a scheduled CM meeting day  Additionally, Cristina Suresh did not request a meeting      Case Management Note    CARLOS Scott    Current suicide risk : Unable to assess    n/a    Medications changes/added/denied? No    Treatment session number: 11    Individual Case Management Visit provided today?  No    [facility] follow up physician's orders: n/a

## 2021-04-05 ENCOUNTER — TELEPHONE (OUTPATIENT)
Dept: FAMILY MEDICINE CLINIC | Facility: CLINIC | Age: 41
End: 2021-04-05

## 2021-04-05 ENCOUNTER — TELEMEDICINE (OUTPATIENT)
Dept: PSYCHIATRY | Facility: CLINIC | Age: 41
End: 2021-04-05
Payer: COMMERCIAL

## 2021-04-05 ENCOUNTER — OFFICE VISIT (OUTPATIENT)
Dept: PSYCHOLOGY | Facility: CLINIC | Age: 41
End: 2021-04-05
Payer: COMMERCIAL

## 2021-04-05 DIAGNOSIS — F33.2 SEVERE EPISODE OF RECURRENT MAJOR DEPRESSIVE DISORDER, WITHOUT PSYCHOTIC FEATURES (HCC): Primary | ICD-10-CM

## 2021-04-05 DIAGNOSIS — F41.1 GENERALIZED ANXIETY DISORDER: ICD-10-CM

## 2021-04-05 PROCEDURE — S0201 PARTIAL HOSPITALIZATION SERV: HCPCS

## 2021-04-05 PROCEDURE — G0177 OPPS/PHP; TRAIN & EDUC SERV: HCPCS

## 2021-04-05 PROCEDURE — 99213 OFFICE O/P EST LOW 20 MIN: CPT | Performed by: PHYSICIAN ASSISTANT

## 2021-04-05 PROCEDURE — G0410 GRP PSYCH PARTIAL HOSP 45-50: HCPCS

## 2021-04-05 NOTE — TELEPHONE ENCOUNTER
Patient had her 2nd covid vaccine on Thurs 04/01/2021 at 5 pm   730 pm that same night she started with a really bad HA and she still has it today and she can not get rid of it       She also has diarrhea     Please advise
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
## 2021-04-05 NOTE — PSYCH
Subjective:     Patient ID: Nieves Luu is a 47 y o  female  [facility] Clinical Progress Notes      Specialized Services Documentation  Therapist must complete separate progress note for each specific clinical activity in which the individual participated during the day  This was not shared due to this is a psychotherapy note  GROUP PSYCHOTHERAPY ([id number]) Group was facilitated virtually in a private office using HIPAA Compliant and Approved [employer] Teams  Terri Falcon consented to the use of tele-video modality of treatment and was virtually present for group psychotherapy today  The group engaged in education about negative core beliefs  Clients participated actively in by disclosing their negative core belief and identified 2 positive characteristics against that core belief  Terri Falcon seemed fairly engaged throughout most of group and was willing to participate without prompting  Terrienoch Falcon stated that one core belief they struggle with is "I'm not good enough " Terri Falcon is encouraged to make progress towards goals and objectives through group participation and will continue to attend psychotherapy group       TX Plan Objectives: 1 1, 1 2  Therapist: Rhonda Whitney MA

## 2021-04-05 NOTE — PSYCH
This note was not shared with the patient due to this is a psychotherapy note    Subjective:     Patient ID: Mason Severino is a 47 y o  female  [facility] Clinical Progress Notes      Specialized Services Documentation  Therapist must complete separate progress note for each specific clinical activity in which the individual participated during the day  Group Psychotherapy     ([id number]) Mason Severino was present in psychoeducational group about medication management and medication tips  Group came up with strategies that helped them remember to take their medications and developed ideas to make it easier in the future  The group talked about understanding the purpose, dose, type, timing and side effects of their medications  Teaching on emergency situations and who to go to for help was brought up as well  Group was encouraged to ask questions on their current medications in an open forum at the end of group  Good progress displayed through engagement in topic      Tx Plan Objective:  1 3 Therapist:  Brice Harper RN;
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
## 2021-04-05 NOTE — PSYCH
This note was not shared with the patient due to this is a psychotherapy note    Subjective:     Patient ID: George Shankar is a 47 y o  female  [facility] Clinical Progress Notes      Specialized Services Documentation  Therapist must complete separate progress note for each specific clinical activity in which the individual participated during the day  Group Psychotherapy [id number]    This group was facilitated virtually in a private office using [employer] and Approved [employer] Teams  George Shankar consented to the use of tele-video modality of treatment  Psychotherapy group focused on exercise What is your house built on?   Discussion was led by prompts from activity  Prompts included:  1) values that govern ones life  2) behaviors one is trying to gain control of/areas of ones life they are trying to change  3) emotions one wants to experience more often, more fully, or in a healthier way  4) Things one is proud of and wants others to see  George Shankar shared that her core values are creativity, love and respect  She shared that she needs to have the ability to be creative through her work, to feel that she has a sense of purpose with her career  Positive progress toward goals  George Shankar is encouraged to continue to make progress towards goals and objectives through group participation and will continue to attend psychotherapy group  Tx Plan Objective: 1 1,1 4, Therapist:  Iza Townsend [facility]        Education Therapy   [id number] George Shankar actively shared in morning assessment and goal review  Presented as Receptive related to readiness to learn  George Shankar did complete goal from last treatment day identifying gaining engaging in a self care activity  did not present with any barriers to learning  Mariajose Johnson 4600 engaged throughout the treatment day  Was engaged in learning related to [employer]   Staff utilized Verbal and A/V teaching methods  Fidel LEE Se shared area of learning and set a goal for outside of program to get outside  Tx Plan Objective: 1 1,1 4, Therapist:  CARLOS Black    Other: [id number] This writer  Spoke to Fidel LEE Se for [email or web address]  She stated that she has been feeling ill since receiving the COVID vaccine on Thursday  She reported a severe headache and that she will be calling her PCP to schedule an appointment as soon as possible  This writer will follow up with Fidel LEE Se tomorrow  [address] denied SI, HI and psychosis  Case Management Note    CARLOS Black    Current suicide risk : [address] denies SI, plan or intent    Medications changes/added/denied? No    Treatment session number: 12    Individual Case Management Visit provided today?  Yes     [facility]ations follow up physician's orders: n/a

## 2021-04-06 ENCOUNTER — APPOINTMENT (OUTPATIENT)
Dept: PSYCHOLOGY | Facility: CLINIC | Age: 41
End: 2021-04-06
Payer: COMMERCIAL

## 2021-04-06 ENCOUNTER — CLINICAL SUPPORT (OUTPATIENT)
Dept: FAMILY MEDICINE CLINIC | Facility: CLINIC | Age: 41
End: 2021-04-06
Payer: COMMERCIAL

## 2021-04-06 ENCOUNTER — DOCUMENTATION (OUTPATIENT)
Dept: PSYCHOLOGY | Facility: CLINIC | Age: 41
End: 2021-04-06

## 2021-04-06 VITALS
BODY MASS INDEX: 25.68 KG/M2 | HEIGHT: 61 IN | WEIGHT: 136 LBS | DIASTOLIC BLOOD PRESSURE: 74 MMHG | SYSTOLIC BLOOD PRESSURE: 120 MMHG | HEART RATE: 80 BPM | TEMPERATURE: 98.4 F

## 2021-04-06 DIAGNOSIS — G44.209 ACUTE NON INTRACTABLE TENSION-TYPE HEADACHE: Primary | ICD-10-CM

## 2021-04-06 PROCEDURE — 3008F BODY MASS INDEX DOCD: CPT | Performed by: PHYSICIAN ASSISTANT

## 2021-04-06 PROCEDURE — 96372 THER/PROPH/DIAG INJ SC/IM: CPT | Performed by: FAMILY MEDICINE

## 2021-04-06 RX ORDER — KETOROLAC TROMETHAMINE 30 MG/ML
30 INJECTION, SOLUTION INTRAMUSCULAR; INTRAVENOUS ONCE
Status: COMPLETED | OUTPATIENT
Start: 2021-04-06 | End: 2021-04-06

## 2021-04-06 RX ORDER — METHYLPREDNISOLONE ACETATE 80 MG/ML
80 INJECTION, SUSPENSION INTRA-ARTICULAR; INTRALESIONAL; INTRAMUSCULAR; SOFT TISSUE ONCE
Status: COMPLETED | OUTPATIENT
Start: 2021-04-06 | End: 2021-04-06

## 2021-04-06 RX ADMIN — KETOROLAC TROMETHAMINE 30 MG: 30 INJECTION, SOLUTION INTRAMUSCULAR; INTRAVENOUS at 13:46

## 2021-04-06 RX ADMIN — METHYLPREDNISOLONE ACETATE 80 MG: 80 INJECTION, SUSPENSION INTRA-ARTICULAR; INTRALESIONAL; INTRAMUSCULAR; SOFT TISSUE at 13:47

## 2021-04-06 NOTE — PROGRESS NOTES
Patient presents in office today for depo 80mg and toradol 30mg injections as per Dr Ace Wall for migraines  Administered into right glute   Tolerated well

## 2021-04-07 ENCOUNTER — OFFICE VISIT (OUTPATIENT)
Dept: PSYCHOLOGY | Facility: CLINIC | Age: 41
End: 2021-04-07
Payer: COMMERCIAL

## 2021-04-07 DIAGNOSIS — F41.1 GENERALIZED ANXIETY DISORDER: ICD-10-CM

## 2021-04-07 DIAGNOSIS — F33.2 SEVERE EPISODE OF RECURRENT MAJOR DEPRESSIVE DISORDER, WITHOUT PSYCHOTIC FEATURES (HCC): Primary | ICD-10-CM

## 2021-04-07 PROCEDURE — S0201 PARTIAL HOSPITALIZATION SERV: HCPCS

## 2021-04-07 PROCEDURE — G0177 OPPS/PHP; TRAIN & EDUC SERV: HCPCS

## 2021-04-07 PROCEDURE — G0410 GRP PSYCH PARTIAL HOSP 45-50: HCPCS

## 2021-04-07 PROCEDURE — G0176 OPPS/PHP;ACTIVITY THERAPY: HCPCS

## 2021-04-07 NOTE — PSYCH
This note was not shared with the patient due to this is a psychotherapy note    Subjective:     Patient ID: Negrito Kohli is a 47 y o  female  [facility] Clinical Progress Notes      Specialized Services Documentation  Therapist must complete separate progress note for each specific clinical activity in which the individual participated during the day  Group Psychotherapy  Life Skills Group (11:30-12:15)  Negrito Kohli actively engaged in group focused on what to keep in their life and what to let go of which was facilitated virtually in a private office using HIPAA Compliant and Approved Member Desk Teams  Liam Martin consented to the use of tele-video modality of treatment and was virtually present for group psychotherapy today  Group members miles an illustration of what they want to keep in their life and what they want to keep out of their life  Group members answered the following questions: 1  How strong is your boundary between what you want and dont want in your life and does that boundary need to change? 2  Which of the things have you already been able to work on and which ones would be new for you? 3  What will be the hardest to let go of or keep out of your life? 4  How will your life or emotional well-being change if you are able to keep or let go of these things? 5  What experience and or people have brought these things into your life? 6  What are the next steps you need to take to move towards having what you represented? Client stated that the next step to move towards having what she represented in the illustration is to build her confidence  Liam Martin continues to make progress towards goals through verbal participation in group; to accomplish long term goals continue to utilize skills learned in programming  Continue with psychotherapy to educate and encourage use of wellness tools  Tx Plan Objective: 1 1,1 2 Therapist:  CARLOS Page
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
## 2021-04-07 NOTE — PSYCH
This note was not shared with the patient due to this is a psychotherapy note   Subjective:     Patient ID: Halina Pierce is a 47 y o  female  [facility] Clinical Progress Notes      Specialized Services Documentation  Therapist must complete separate progress note for each specific clinical activity in which the individual participated during the day  Allied Therapy   This group was facilitated virtually in a private office using [employer] and Approved [employer] Image by [employer] Teams  Halina Pierce consented to the use of tele-video modality of treatment  [id number]  Halina Pierce  actively shared in [facility] group focused DBT Module Interpersonal Effectiveness and [facility] skill  Engaged in tasks exploring what makes it difficult to share and strategies to improve with supports   Casandra Rodas participated in discussion related to communication roadblocks  She was an active participant as group worked together to practice writing out a meaningful interaction using [facility]  Some progress toward goal noted  Continue AT to encourage sharing, personal advocacy and practice of wellness tools           Tx Plan Objective: 1 1,1 4, Therapist:  Xochitl PATE

## 2021-04-07 NOTE — PSYCH
This note was not shared with the patient due to this is a psychotherapy note    Subjective:     Patient ID: Liliana Mcdowell is a 47 y o  female  [facility] Clinical Progress Notes      Specialized Services Documentation  Therapist must complete separate progress note for each specific clinical activity in which the individual participated during the day  Group Psychotherapy [id number]    This group was facilitated virtually in a private office using [email or web address] and Approved [employer] Teams  Liliana Mcdowell consented to the use of tele-video modality of treatment  Psychotherapy group focused on identifying irrational thoughts and cognitive distortions and restructuring techniques  First, psychoeducation was provided on the cognitive model and cognitive distortions  Through discussion, Liliana Mcdowell was able to gain increased awareness of how her thoughts effects ones feelings and behaviors  Cognitive restructuring techniques reviewed include:  thought record log, socratic questioning, decatastrophizing, and putting thoughts on trial  Liliana Mcdowell did not participate in group discussion  Minimal progress toward goals  Liliana Mcdowell  is encouraged to continue making progress towards goals and objectives through group participation and will continue to attend psychotherapy group  Tx Plan Objective: 1 1, Therapist:  CARLOS Chase      Education Therapy   [id number] Liliana Mcdowell with prompts shared in morning assessment and goal review  Presented as Receptive related to readiness to learn  Liliana Mcdowell did complete goal from last treatment day identifying gaining going to the doctor and feeling better  did not present with any barriers to learning  Mariajose Johnson [id number] engaged throughout the treatment day  Was engaged in learning related to [employer]  Staff utilized Verbal and A/V teaching methods    Liliana Mcdowell shared area of learning and set a goal for outside of program to rest and relax  Tx Plan Objective: 1 1,1 2, Therapist:  CARLOS Eric    Other: Today is not a scheduled CM meeting day, additionally, Clint Vo did not request a meeting  Case Management Note    CARLOS Eric    Current suicide risk : Unable to assess    n/a    Medications changes/added/denied? No    Treatment session number: 13    Individual Case Management Visit provided today?  No    Innovations follow up physician's orders: n/a

## 2021-04-08 ENCOUNTER — PATIENT MESSAGE (OUTPATIENT)
Dept: FAMILY MEDICINE CLINIC | Facility: CLINIC | Age: 41
End: 2021-04-08

## 2021-04-08 ENCOUNTER — OFFICE VISIT (OUTPATIENT)
Dept: PSYCHOLOGY | Facility: CLINIC | Age: 41
End: 2021-04-08
Payer: COMMERCIAL

## 2021-04-08 DIAGNOSIS — M54.50 LUMBAR BACK PAIN: ICD-10-CM

## 2021-04-08 DIAGNOSIS — F33.2 SEVERE EPISODE OF RECURRENT MAJOR DEPRESSIVE DISORDER, WITHOUT PSYCHOTIC FEATURES (HCC): Primary | ICD-10-CM

## 2021-04-08 PROCEDURE — G0410 GRP PSYCH PARTIAL HOSP 45-50: HCPCS

## 2021-04-08 PROCEDURE — G0177 OPPS/PHP; TRAIN & EDUC SERV: HCPCS

## 2021-04-08 PROCEDURE — S0201 PARTIAL HOSPITALIZATION SERV: HCPCS

## 2021-04-08 RX ORDER — CYCLOBENZAPRINE HCL 10 MG
10 TABLET ORAL
Qty: 30 TABLET | Refills: 3 | Status: SHIPPED | OUTPATIENT
Start: 2021-04-08 | End: 2021-07-02 | Stop reason: SDUPTHER

## 2021-04-08 NOTE — PSYCH
This note was not shared with the patient due to this is a psychotherapy note    Subjective:     Patient ID: Alise Giron is a 47 y o  female  [facility] Clinical Progress Notes      Specialized Services Documentation  Therapist must complete separate progress note for each specific clinical activity in which the individual participated during the day  Other: ([id number]) This writer spoke to Alise Giron for [employer]  Alise Giron reports using gratitude journal as an effective coping skill that she completes daily  She also made an OP therapy appointment for herself for April 23, 2021 at 11:00 am  Discharge is scheduled for Friday April 16th  Alise Giron reports feeling "much better mentally, physically I'm taking it one day at a time " Alise Giron has been struggling with some of her physical health issues throughout being in the program  She consistently works through those tough times by utilizing her new skills and also telling herself that everyone has bad days and it's okay  Alise Giron denies SI,HI, and psychosis  Case Management Note    DINO Garcia    Current suicide risk : [address] denies SI, plan or intent    Medications changes/added/denied? No    Treatment session number: 14    Individual Case Management Visit provided today?  Yes    Innovations follow up physician's orders: n/a

## 2021-04-08 NOTE — PSYCH
This note was not shared with the patient due to this is a psychotherapy note   Subjective:     Patient ID: George Shankar is a 47 y o  female  [facility] Clinical Progress Notes      Specialized Services Documentation  Therapist must complete separate progress note for each specific clinical activity in which the individual participated during the day  GROUP PSYCHOTHERAPY  ([id number]) Group was facilitated virtually in a private office using HIPAA Compliant and Approved [employer] Teams  George Shankar consented to the use of tele-video modality of treatment and was virtually present for group psychotherapy today  she attentively listened to [facility] share his life story as he co-led this session  Group encouraged power of learning about self, accepting illness and personal responsibility in recovery  Community resources reviewed in addition to personal resources like the affirmations  Progress toward goals noted  Continue psychotherapy to encourage self -awareness and healthy engagement of supports      [place] Plan Objectives: 1 1, 1 2, 1 4   Therapist: Denita Gerardo MA, [place]

## 2021-04-08 NOTE — PSYCH
This note was not shared with the patient due to this is a psychotherapy note    Subjective:     Patient ID: Tobi Joaquin is a 47 y o  female  [facility] Clinical Progress Notes      Specialized Services Documentation  Therapist must complete separate progress note for each specific clinical activity in which the individual participated during the day  Group Psychotherapy     ([id number]) Tobi Joaquin was present in psychoeducation group this morning which focused on sleep hygiene  Group was run by Draker  Group was educated on the sleep cycle and importance of sleep for physical and mental health  Explored the neurotransmitters responsible for sleep  Group was then educated on what sleep hygiene is  The group explored topics like setting up a schedule, reducing distractions and stressful situations close to bed and the pros and cons of medication for sleep  Additional tips on sleep hygiene were discussed  Lastly, the groups discussed the parts of sleep hygiene they would like to attempt in order to positively influence their sleep  Tobi Joaquin talked about how essential oils have helped her sleep better  Good progress toward goal observed  Continue psychoeducation group to increase awareness of good sleeping habits to promote wellness  Tx Plan Objective:  1 3 Therapist:  Castillo Billy;  Co-led by Azul Busby RN

## 2021-04-08 NOTE — PSYCH
Subjective:     Patient ID: Ramone Macias is a 47 y o  female  [facility] Clinical Progress Notes      Specialized Services Documentation  Therapist must complete separate progress note for each specific clinical activity in which the individual participated during the day  This was not shared due to this is a psychotherapy note  GROUP PSYCHOTHERAPY ([id number]) Group was facilitated virtually in a private office using HIPAA Compliant and Approved [employer] Teams  Francisco Osorio consented to the use of tele-video modality of treatment and was virtually present for group psychotherapy today  The group engaged in education about codependency and what it entails  Members learned the difference between the enabler and the dependent in a codependent relationship  The group then answered the following questions:  1  In the codependent cycle, do you view yourself as the enabler or the dependent? Why?  2  I know I am the enabler/dependent because   Francisco Osorio seemed very engaged and became tearful at times  Francisco Osorio stated that they view themselves as the enabler  Francisco Osorio is encouraged to make progress towards goals and objectives through group participation and will continue to attend psychotherapy group  Tx plan objective: 1 1, 1 2   Therapist: Catina Vazquez MA    Education Therapy   [id number] Ramone Macias actively shared in morning assessment and goal review  Presented as Receptive related to readiness to learn  Ramone Macias did complete goal from last treatment day identifying gaining less anxiety  did not present with any barriers to learning  Mariajose Johnson [phone] engaged throughout the treatment day  Was engaged in learning related to Illness, Aftercare and Wellness Tools  Staff utilized Verbal and A/V teaching methods  Ramone Macias shared area of learning and set a goal for outside of program to make it through her work day        Tx Plan Objective: 1 4, Therapist:  Catina Vazquez MA
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
## 2021-04-09 ENCOUNTER — OFFICE VISIT (OUTPATIENT)
Dept: PSYCHOLOGY | Facility: CLINIC | Age: 41
End: 2021-04-09
Payer: COMMERCIAL

## 2021-04-09 DIAGNOSIS — F33.2 SEVERE EPISODE OF RECURRENT MAJOR DEPRESSIVE DISORDER, WITHOUT PSYCHOTIC FEATURES (HCC): Primary | ICD-10-CM

## 2021-04-09 PROCEDURE — S0201 PARTIAL HOSPITALIZATION SERV: HCPCS

## 2021-04-09 PROCEDURE — G0410 GRP PSYCH PARTIAL HOSP 45-50: HCPCS

## 2021-04-09 PROCEDURE — G0177 OPPS/PHP; TRAIN & EDUC SERV: HCPCS

## 2021-04-09 NOTE — PSYCH
This note was not shared with the patient due to this is a psychotherapy note    Subjective:     Patient ID: Cristina Suresh is a 47 y o  female  [facility] Clinical Progress Notes      Specialized Services Documentation  Therapist must complete separate progress note for each specific clinical activity in which the individual participated during the day  Group Psychotherapy     ([id number]) Cristina Suresh actively shared in psychotherapy group which focused on Weekly Wellness Assessment  Theyengaged in self-rate and discussion  Group members individually went through the assessment and rated themselves based on the past week  Group members shared assessment results  Group discussed the six domains of wellness; physical, emotional, cognitive, vocational, social, and spiritual  Group discussed strengths, challenges, barriers, and ways to increase each domain in and open forum and developed goals  Cristina Suresh shared areas they are doing well in and goals they would like to work on for the upcoming week  Good progress towards goal observed and shared  Continue psychotherapy to further encourage self-reflection on strengths and challenges with personal wellness      Tx Plan Objective:  1 1,1 2,1 3,1 4 Therapist:  Darvin Vargas RN;

## 2021-04-09 NOTE — PSYCH
Subjective:     Patient ID: Marco Loza is a 47 y o  female  [facility] Clinical Progress Notes      Specialized Services Documentation  Therapist must complete separate progress note for each specific clinical activity in which the individual participated during the day  This was not shared due to this is a psychotherapy note  GROUP PSYCHOTHERAPY ([id number]) Group was facilitated virtually in a private office using HIPAA Compliant and Approved [employer] Teams  Aden Tamayo consented to the use of tele-video modality of treatment and was virtually present for group psychotherapy today  The group engaged in education about the benefits of Gratitude Journaling  The group practiced journaling by responding to one of the following prompts:  1  Something about today (or this week) Ill always want to remember  2  Today I smiled when  3  Something I was happy about this Manual Dania seemed fairly engaged throughout the group session  Aden Tamayo used the 3rd prompt and stated, "My daughter resuming her favorite sports " Adne Tamayo is encouraged to make progress towards goals and objectives through group participation and will continue to attend psychotherapy group  Tx plan objective: 1 1, 1 2   Therapist: Tristan Weber MA    Education Therapy   [id number] Marco Loza actively shared in morning assessment and goal review  Presented as Receptive related to readiness to learn  Marco Loza did complete goal from last treatment day identifying gaining responsibility  did not present with any barriers to learning  Mariajose Johnson 1391 engaged throughout the treatment day  Was engaged in learning related to Illness, Aftercare and Wellness Tools  Staff utilized Verbal and A/V teaching methods  Marco Loza shared area of learning and set a goal for outside of program to engage in self-care        Tx Plan Objective: 1 4, Therapist:  Tristan Weber MA

## 2021-04-09 NOTE — PSYCH
This note was not shared with the patient due to this is a psychotherapy note    Subjective:     Patient ID: Halle Gilbert is a 47 y o  female  [facility] Clinical Progress Notes      Specialized Services Documentation  Therapist must complete separate progress note for each specific clinical activity in which the individual participated during the day  Group Psychotherapy [id number]    This group was facilitated virtually in a private office using [employer] and Approved [employer] Teams  Halle Gilbert consented to the use of tele-video modality of treatment  Psychotherapy group focused on stress management  The group explored what causes stress, how stress can be healthy, how too much stress and poor stress management skills can cause significant consequences on a person's mental health, physical health, relationships, and overall mood, and stress management tools  Halle Gilbert discussed how she finds it difficult to manage stress when it is outside her control with her children  She identified that self-care is something she needs to continuously work on  Positive progress toward goals  Continue psychotherapy group to explore stressors and ways of coping  Tx Plan Objective: 1 1,1 2, Therapist:  CARLOS Youssef      Other: This writer attempted [address] [facility] meeting with Halle Neri Sakirillmarkie missed meeting and will be rescheduled for Monday  Case Management Note    CARLOS Youssef    Current suicide risk : Unable to assess    n/a    Medications changes/added/denied? No    Treatment session number: 15    Individual Case Management Visit provided today?  No    [facility] follow up physician's orders: n/a
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
## 2021-04-12 ENCOUNTER — OFFICE VISIT (OUTPATIENT)
Dept: PSYCHOLOGY | Facility: CLINIC | Age: 41
End: 2021-04-12
Payer: COMMERCIAL

## 2021-04-12 DIAGNOSIS — F33.2 SEVERE EPISODE OF RECURRENT MAJOR DEPRESSIVE DISORDER, WITHOUT PSYCHOTIC FEATURES (HCC): Primary | ICD-10-CM

## 2021-04-12 DIAGNOSIS — F41.1 GENERALIZED ANXIETY DISORDER: ICD-10-CM

## 2021-04-12 PROCEDURE — S0201 PARTIAL HOSPITALIZATION SERV: HCPCS

## 2021-04-12 PROCEDURE — G0176 OPPS/PHP;ACTIVITY THERAPY: HCPCS

## 2021-04-12 PROCEDURE — G0410 GRP PSYCH PARTIAL HOSP 45-50: HCPCS

## 2021-04-12 PROCEDURE — G0177 OPPS/PHP; TRAIN & EDUC SERV: HCPCS

## 2021-04-12 NOTE — PSYCH
This note was not shared with the patient due to this is a psychotherapy note    Subjective:     Patient ID: Mimi Steinberg is a 47 y o  female  [facility] Clinical Progress Notes      Specialized Services Documentation  Therapist must complete separate progress note for each specific clinical activity in which the individual participated during the day  Group Psychotherapy [id number]    This group was facilitated virtually in a private office using [employer] and Approved [employer] Teams  Mimi Steinberg consented to the use of tele-video modality of treatment  Psychotherapy group focused on setting and enforcing healthy boundaries  Emotional and physical boundaries were reviewed and the group explored why boundary violation is a common issue  Mimi Steinberg did not share during group  She appeared to be anxious and quiet as evidenced by her actions that were visible  Chaya Barrera explored ways to set personal boundaries  Personal bill of rights reviewed  Minimal progress toward goals  Continue psychotherapy group to explore stressors and ways of coping   Tx Plan Objective: 1 1,1 2, Therapist:  CARLOS Caba      Other: This writer contacted Mimi Steinberg for Tomasz Rodriguez [phone]  Mimi Steinberg did not answer and did not return call  Case Management Note    CARLOS Caba    Current suicide risk : Unable to assess    n/a    Medications changes/added/denied? No    Treatment session number: 16    Individual Case Management Visit provided today?  No- attempted    [facility] follow up physician's orders: n/a

## 2021-04-12 NOTE — PSYCH
Assessment/Plan:      Severe episode of recurrent major depressive disorder, without psychotic features     Generalized anxiety disorder     Subjective:      Patient ID: Chaya Barrera is a 47 y  o  female      [facility] Treatment Plan     AREAS OF NEED: Depression as evidenced by; excessive tearfulness, frequent crying spells, lack of motivation, hopelessness,      Date Initiated: 03/17/2021     Strengths: Supportive mother and , loves her children        LONG TERM GOAL:      Date Initiated: 03/17/2021     1 0  To decrease depressive symptoms by implementing effective coping skills, from baseline 8/10, to 5/10     Target Date: 04/28/2021     Completion Date:            SHORT TERM OBJECTIVES:      Date Initiated: 03/17/2021     1 1 I will identify 3 positive affirmations I can begin to recite to increase my own confidence and utilize them as I complete at least 1 task daily       Revision Date: 03/26/2021, 04/12/2021     Target Date: 03/26/2021     Completion Date:         Date Initiated: 03/17/2021     1 2 I will identify 3 things I need to do to take care of MYSELF on a daily basis and begin to do them     Revision Date: 03/26/2021, 04/12/2021     Target Date: 03/26/2021     Completion Date:         Date Initiated: 03/17/2021     1 3 I will take medications as prescribed and share questions and concerns if arise       Revision Date: 03/26/2021     Target Date: 03/26/2021     Completion Date:         Date Initiated: 03/17/2021     1 4 I will identify 3 ways my supports can assist in my recovery and agree to staff/support contact as indicated       Revision Date: 03/26/2021, 04/12/2021     Target Date: 03/26/2021     Completion Date:                7 DAY REVISION:     Date Initiated: 03/26/2021     1 5 I will identify 3 skills I am learning in program to manage my anxiety in the moment and consistently use to support my ongoing wellness     Revision Date: 04/12/2021     Target Date: 04/05/2021     Completion Date:       Date initiated: 04/12/2021    1 6 I will identify 3 skills I need to continue to use to support my ongoing wellness as I prepare for discharge to OP level of care  Revision Date:    Target Date: 04/21/2021    Completion Date:            PSYCHIATRY:  Date Initiated: 03/17/2021     Medication management and education     Revision Date: 03/26/2021, 04/12/2021     The person(s) responsible for carrying out the plan is Dr Ashwini Tyson MD        NURSING:   Date Initiated: 03/17/2021     1 1,1 2,1 4 RN will provide daily wellness group five days weekly to [address] S/S of her diagnosis and medications used in treatment    1 1,1 2,1 3,1 4,1 5 RN will continue to provide daily wellness group five days weekly to [address] [facility] on her S/S of his diagnosis and medications used in treatment       Revision Date: 03/26/2021, 04/12/2021         The person(s) responsible for carrying out the plan is Calista Alexandre RN        PSYCHOLOGY:   Date Initiated: 03/17/2021     1 1,1 2,1 4 Provide psychotherapy group five times per week to allow opportunity for Chaya Barrera to explore stressors and ways of coping  1 1,1 2,1 4,Continue to provide psychotherapy group three times per week to allow opportunity for Chaya Barrera and encourage sharing of stressors, skills and positive change      Revision Date: 03/26/2021, 04/12/2021     The person(s) responsible for carrying out the plan is DINO Sánchez and Palomo Romo MA        ALLIED THERAPY:   Date Initiated: 03/17/2021     Revision Date: 03/26/2021, 04/12/2021     1 1,1 2 Engage Chaya Barrera in AT group five times weekly to encourage development and use of wellness tools to decrease symptoms and promote recovery through meaningful activity      The person(s) responsible for carrying out the plan is HERLINDA Bajwa        CASE MANAGEMENT:   Date Initiated: 03/17/2021     Revision Date: 03/26/2021         1 0 Continue to engage in individual case management 3-4 times weekly, to discuss and        prepare for aftercare services, discuss progress and other community resources   UR contact    as necessary      The person(s) responsible for carrying out the plan is DINO Sánchez        TREATMENT REVIEW/COMMENTS:      DISCHARGE CRITERIA: Identify 3 signs of progress and complete relapse prevention plan       DISCHARGE PLAN: OP therapy     Estimated Length of Stay: 10 treatment days               Diagnosis and Treatment Plan explained to Chaya Larkin relates understanding diagnosis and is agreeable to Treatment Plan             CLIENT COMMENTS / Please share your thoughts, feelings, need and/or experiences regarding your treatment plan: _____________________________________________________________________________________________________________________________________________________________________________________________________________________________________________________________________________________________________________________ Date/Time: ______________      Patient Signature: _________________________________          DUE TO COVID-19, CURRENTLY ALL INTERVENTIONS ARE BEING OFFERED VIRTUALLY     Date/Time: ______________       Signature: _________________________________         Date/Time: ______________

## 2021-04-12 NOTE — PSYCH
This note was not shared with the patient due to this is a psychotherapy note    Subjective:     Patient ID: Simon Medeiros is a 47 y o  female  [facility] Clinical Progress Notes      Specialized Services Documentation  Therapist must complete separate progress note for each specific clinical activity in which the individual participated during the day  Group Psychotherapy     ([id number]) Simon Medeiros was present in psychoeducation group which focused on exercise to improve physical and mental wellbeing  Topics included the benefits of exercise to improve physical and mental wellbeing  The group then participated in 20 minutes of low intensity chair yoga  They then discussed ideas on how to improve stamina and ways to incorporate exercise into their lives  Simon Medeiros did participate in discussion  She reported the difficulty she has with exercise r/t her physical health, but stated she was excited to try chair yoga  She actively participated in the exercise  Good progress toward goal noted  Continue psychoeducation to educate on the importance of making physical fitness a priority       Tx Plan Objective:  1 3 Therapist: Leonor Orta RN
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
## 2021-04-12 NOTE — PSYCH
Subjective:     Patient ID: Ramone Macias is a 47 y o  female  [facility] Clinical Progress Notes      Specialized Services Documentation  Therapist must complete separate progress note for each specific clinical activity in which the individual participated during the day  This was not shared due to this is a psychotherapy note  GROUP PSYCHOTHERAPY ([id number]) Group was facilitated virtually in a private office using HIPAA Compliant and Approved [employer] Teams  Francisco Osorio consented to the use of tele-video modality of treatment and was virtually present for group psychotherapy today  The group engaged in education anger warning signs  The group picked two warning signs they struggle with and reflected on situations that trigger these warning signs  Francisco Osorio presented with a labile mood throughout the group session  Francisco Osorio stated that two warning signs they struggle with are, I start shaking and I feel hot  Lisbeth Bleak is encouraged to make progress towards goals and objectives through group participation and will continue to attend psychotherapy group  Tx plan objective: 1 1, 1 2   Therapist: Catina Vazquez MA    Education Therapy   [id number] Ramone Macias actively shared in morning assessment and goal review  Presented as Receptive related to readiness to learn  Ramone Macias did complete goal from last treatment day identifying gaining self-care  did not present with any barriers to learning  Mariajose Johnson [id number] engaged throughout the treatment day  Was engaged in learning related to Illness, Aftercare and Wellness Tools  Staff utilized Verbal and A/V teaching methods  Ramone Macias shared area of learning and set a goal for outside of program to work through her physical pain        Tx Plan Objective: 1 4, Therapist:  Catina Vazquez MA

## 2021-04-13 ENCOUNTER — OFFICE VISIT (OUTPATIENT)
Dept: PSYCHOLOGY | Facility: CLINIC | Age: 41
End: 2021-04-13
Payer: COMMERCIAL

## 2021-04-13 ENCOUNTER — TELEMEDICINE (OUTPATIENT)
Dept: PSYCHIATRY | Facility: CLINIC | Age: 41
End: 2021-04-13
Payer: COMMERCIAL

## 2021-04-13 DIAGNOSIS — F33.2 SEVERE EPISODE OF RECURRENT MAJOR DEPRESSIVE DISORDER, WITHOUT PSYCHOTIC FEATURES (HCC): Primary | ICD-10-CM

## 2021-04-13 DIAGNOSIS — F41.1 GENERALIZED ANXIETY DISORDER: Primary | ICD-10-CM

## 2021-04-13 DIAGNOSIS — F33.2 SEVERE EPISODE OF RECURRENT MAJOR DEPRESSIVE DISORDER, WITHOUT PSYCHOTIC FEATURES (HCC): ICD-10-CM

## 2021-04-13 PROCEDURE — S0201 PARTIAL HOSPITALIZATION SERV: HCPCS

## 2021-04-13 PROCEDURE — 99212 OFFICE O/P EST SF 10 MIN: CPT | Performed by: PHYSICIAN ASSISTANT

## 2021-04-13 PROCEDURE — G0177 OPPS/PHP; TRAIN & EDUC SERV: HCPCS

## 2021-04-13 PROCEDURE — G0410 GRP PSYCH PARTIAL HOSP 45-50: HCPCS

## 2021-04-13 RX ORDER — BUSPIRONE HYDROCHLORIDE 10 MG/1
15 TABLET ORAL
COMMUNITY
Start: 2021-04-13 | End: 2021-05-03 | Stop reason: DRUGHIGH

## 2021-04-13 NOTE — PSYCH
This note was not shared with the patient due to this is a psychotherapy note    Subjective:     Patient ID: Liliana Mcdowell is a 47 y o  female  [facility] Clinical Progress Notes      Specialized Services Documentation  Therapist must complete separate progress note for each specific clinical activity in which the individual participated during the day  Group Psychotherapy     ([id number]) Liliana Mcdowell was present in psychoeducation group which focused on nutrition to improve physical and mental wellbeing  Topics included appropriate serving sizes for all food groups as well as benefits to eating for health  Group was educated on the research done to proving how eating for health can improve physical and emotional well being  Discussed vitamins and mineral use and those specific to mental health  Individual group members then picked a food item while the rest of the group discussed the healthy and unhealthy aspects of the item  Group then discussed ideas on how to improve on their nutrition  Liliana Mcdowell reported that she will begin to read labels better  She has food sensitivities she must manage  Good progress toward goal noted  Continue psychoeducation to educate on the importance of making nutrition a priority      Tx Plan Objective:  1 3 Therapist: Manasa Gupta RN

## 2021-04-13 NOTE — PSYCH
Problem: Mobility Impaired (Adult and Pediatric) Goal: *Acute Goals and Plan of Care (Insert Text) Description: FUNCTIONAL STATUS PRIOR TO ADMISSION: Patient was modified independent using a rolling walker and and L BKA prosthesis for functional mobility. HOME SUPPORT PRIOR TO ADMISSION: The patient lived with family but did not require assist. 
 
Physical Therapy Goals Initiated 8/23/2020 1. Patient will move from supine to sit and sit to supine , scoot up and down, and roll side to side in bed with minimal assistance/contact guard assist within 7 day(s). 2.  Patient will transfer from bed to chair and chair to bed with minimal assistance/contact guard assist using the least restrictive device within 7 day(s). 3.  Patient will perform sit to stand with minimal assistance/contact guard assist within 7 day(s). 4.  Patient will ambulate with minimal assistance/contact guard assist for 10 feet with the least restrictive device within 7 day(s). Outcome: Progressing Towards Goal 
 
PHYSICAL THERAPY TREATMENT Patient: Kathie De Dios (55 y.o. female) Date: 8/27/2020 Diagnosis: Sepsis ([facility] [address] [A41.9] Sepsis ([facility] [address] Procedure(s) (LRB): 
RIGHT CHEST WOUND RE EXPLORATION, DELAYED CLOSURE (URGENT) (Right) 3 Days Post-Op Precautions: Fall, Contact(per thoracic, no restrictions aside from pain with RUE) Chart, physical therapy assessment, plan of care and goals were reviewed. ASSESSMENT Patient continues with skilled PT services and is progressing towards goals. Pt received supine in bed and agreeable to therapy. Bed mobility required Min-Mod Ax2. At EOB pt donned prosthetic in preparation for transfers. Pt needed a brief change and performed two sit<>stand transfers with prolonged standing using RW and Mod Ax2. Pt able to sidestep to [facility] with LUE on RW to stabilize and Mod Ax2. Pt upright EOB to change gown and replace shoulder sling. Pt reported R shoulder pain throughout treatment. This note was not shared with the patient due to this is a psychotherapy note    Subjective:     Patient ID: Rachna Hubbard is a 47 y o  female  [facility] Clinical Progress Notes      Specialized Services Documentation  Therapist must complete separate progress note for each specific clinical activity in which the individual participated during the day  Group Psychotherapy [id number]    This group was facilitated virtually in a private office using [employer] and Approved [employer] Teams  Rachna Hubbard consented to the use of tele-video modality of treatment  Psychotherapy group focused on empowerment  First group members were challenged to identify what empowerment means to them  Rachna Hubbard explored how she empowers oneself, what hinders her from feeling empowered, and what fuels her empowerment  Rachna Hubbard shared that what empowers her, is her ability to care for others and seeing how happy they are, however, she stated that it can be very draining to her as well  Positive progress toward goals  Continue psychotherapy group to explore stressors and ways of coping  Tx Plan Objective: 1 1,1 2, Therapist:  CARLOS Wood        Other: Today was not a scheduled CM meeting, additionally, Rachna Hubbard did not request a meeting  Case Management Note    DINO Wood    Current suicide risk : Unable to assess    n/a    Medications changes/added/denied? No    Treatment session number: 17    Individual Case Management Visit provided today?  No    [facility] follow up physician's orders: n/a Pt continues to present with tachycardia with  exertion with supine HR  standing. Current Level of Function Impacting Discharge (mobility/balance): Mod Ax2 Other factors to consider for discharge: caregiver burden PLAN : 
Patient continues to benefit from skilled intervention to address the above impairments. Continue treatment per established plan of care. to address goals. Recommendation for discharge: (in order for the patient to meet his/her long term goals) Therapy 3 hours per day 5-7 days per week This discharge recommendation: 
Has been made in collaboration with the attending provider and/or case management IF patient discharges home will need the following DME: patient owns DME required for discharge SUBJECTIVE:  
Patient stated I have too many things attached to me.  OBJECTIVE DATA SUMMARY:  
Critical Behavior: 
Neurologic State: Alert, Eyes open spontaneously Orientation Level: Oriented X4 Cognition: Appropriate decision making, Appropriate for age attention/concentration, Appropriate safety awareness, Follows commands Functional Mobility Training: 
Bed Mobility: 
Rolling: Moderate assistance;Assist x2 Supine to Sit: Moderate assistance;Assist x2 Sit to Supine: Minimum assistance;Assist x2 Scooting: Minimum assistance Transfers: 
Sit to Stand: Moderate assistance;Assist x2 Stand to Sit: Moderate assistance;Assist x2 Balance: 
Sitting: Impaired Sitting - Static: Fair (occasional); Good (unsupported) Sitting - Dynamic: Fair (occasional) Standing: Impaired Standing - Static: Constant support; Fair 
Standing - Dynamic : Constant support; Gaurang Perking Pain Rating: 
R shoulder Activity Tolerance:  
Fair Please refer to the flowsheet for vital signs taken during this treatment. After treatment patient left in no apparent distress: Supine in bed and Call bell within reach COMMUNICATION/COLLABORATION:  
The patients plan of care was discussed with: Registered nurse. TIFFANIE Swanson Time Calculation: 39 mins
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
## 2021-04-13 NOTE — PSYCH
Subjective:     Patient ID: Marcelina Bowling is a 47 y o  female  [facility] Clinical Progress Notes      Specialized Services Documentation  Therapist must complete separate progress note for each specific clinical activity in which the individual participated during the day  This was not shared due to this is a psychotherapy note  GROUP PSYCHOTHERAPY ([id number]) Group was facilitated virtually in a private office using HIPAA Compliant and Approved [employer] Teams  Maritza Boss consented to the use of tele-video modality of treatment and was virtually present for group psychotherapy today  The group engaged in a discussion about strengths exploration  Members were asked to answer the following question:  1  Imagine a time you felt you were at your best  Describe what you were doing, and what about that situation made you feel confident  Compare this to a time when you felt uneasy, or a time you were not confident  What are the differences? 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

## 2021-04-13 NOTE — PSYCH
Subjective:     Patient ID: Jose Agudelo is a 47 y o  female  [facility] Clinical Progress Notes      Specialized Services Documentation  Therapist must complete separate progress note for each specific clinical activity in which the individual participated during the day  This was not shared due to this is a psychotherapy note  GROUP PSYCHOTHERAPY ([id number]) Group was facilitated virtually in a private office using HIPAA Compliant and Approved [employer] Teams  Owen Nagel consented to the use of tele-video modality of treatment and was virtually present for group psychotherapy today  The group engaged in education about self-sabotaging behavior  We specifically focused on self-sabotaging habits related to how they approach change  Each member was asked to answer the following questions after reading the following bullets:     You expect yourself to succeed in making life changes without designating any time or mental space to accomplish them   You see your capacity to change as being dependent on other peoples behavior  For example, youd exercise more or make better spending choices if your spouse was more supportive and on board  Guille Walker a perfectionist who is dismissive of incremental improvements, and youre only satisfied when 100 percent of a problem is fixed  Guille Walker too busy chasing cows to build a fence  Marietta Lindsay too busy to come up with processes or systems that would help you better manage your time  - Which of the statements resonated with you the most? Why?  - How will you work on this? Owen Nagel seemed very engaged throughout the group session and was willing to participate without prompting  Owen Nagel picked the third statement, explaining that she struggles with perfectionism in many areas of her life  Owen Nagel is encouraged to make progress towards goals and objectives through group participation and will continue to attend psychotherapy group       Tx plan objective: 1 1, 1 2 Therapist: Junior Brenden MA    Education Therapy   [id number] [address] actively shared in morning assessment and goal review  Presented as Receptive related to readiness to learn  [address] did complete goal from last treatment day identifying gaining a sense of accomplishment  did not present with any barriers to learning  Mariajose Nayana Vaughn Miller Shannon engaged throughout the treatment day  Was engaged in learning related to Illness, Aftercare and Wellness Tools  Staff utilized Verbal and A/V teaching methods    [address] shared area of learning and set a goal for outside of program to spend quality time with her family from 7pm-8pm       Tx Plan Objective: 1 4, Therapist:  Junior Brenden MA

## 2021-04-14 ENCOUNTER — OFFICE VISIT (OUTPATIENT)
Dept: PSYCHOLOGY | Facility: CLINIC | Age: 41
End: 2021-04-14
Payer: COMMERCIAL

## 2021-04-14 DIAGNOSIS — F33.2 SEVERE EPISODE OF RECURRENT MAJOR DEPRESSIVE DISORDER, WITHOUT PSYCHOTIC FEATURES (HCC): Primary | ICD-10-CM

## 2021-04-14 PROCEDURE — G0177 OPPS/PHP; TRAIN & EDUC SERV: HCPCS

## 2021-04-14 PROCEDURE — G0410 GRP PSYCH PARTIAL HOSP 45-50: HCPCS

## 2021-04-14 PROCEDURE — S0201 PARTIAL HOSPITALIZATION SERV: HCPCS
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
## 2021-04-14 NOTE — PSYCH
This note was not shared with the patient due to this is a psychotherapy note    Subjective:     Patient ID: Lalitha Ojeda is a 47 y o  female  [facility] Clinical Progress Notes      Specialized Services Documentation  Therapist must complete separate progress note for each specific clinical activity in which the individual participated during the day  Group Psychotherapy [id number]    This group was facilitated virtually in a private office using [employer] and Approved [employer] Teams  Lalitha Ojeda consented to the use of tele-video modality of treatment  Psychotherapy group focused on creating a dialectal balance and seeing options in ones life  Exercises were completed together:  1  What is something that is going right in your life? 2  Think about a problem that you are experiencing  3  What are your resources/supports? 4  Think about that problem and rate it from 0 to 10What would it look like if the situation would improve by 2 points? 5  What is a small step that you can do today, to help make those improvements? 6  Is there a silver lining? What is the silver lining? Lalitha Ojeda shared that she struggles with her marriage and not being on the same page  She shared that one small step would be to talk with her  about possible marriage counseling  Positive progress toward goals  Lalitha Ojeda is encouraged to continue to make progress towards goals and objectives through group participation and will continue to attend psychotherapy group  Tx Plan Objective: 1 1,1 2, Therapist:  CARLOS Denny       Other: Lalitha Ojeda did not attend CM again today  This is second time this week  Discharge scheduled for this Friday, April 16th  Aftercare services are in place  Case Management Note    CARLOS Denny    Current suicide risk : Unable to assess    n/a    Medications changes/added/denied?  No    Treatment session number: 18    Individual Case Management Visit provided today?  No - attempted     [facility] follow up physician's orders: n/a

## 2021-04-14 NOTE — PSYCH
This note was not shared with the patient due to this is a psychotherapy note    Subjective:     Patient ID: Trena Johnson is a 47 y o  female  [facility] Clinical Progress Notes      Specialized Services Documentation  Therapist must complete separate progress note for each specific clinical activity in which the individual participated during the day  Group Psychotherapy     ([id number]) Trena Johnson was present in psychoeducation group which focused on mental health  The group played mental health jeopardy  Group learned about diagnosis, treatments, coping skills, social supports and self-esteem  Group was able to use questions as prompts for further discussions  Trena Johnson was engaged and participatory through entire game  Good progress towards goal  Continue psychoeducational group to educate about mental health and treatments  Tx Plan Objective: 1 3, 1 4 Therapist:  Janell Rowland RNity  Education Therapy   [id number] Trena Johnson actively shared in morning assessment and goal review  Presented as Receptive related to readiness to learn  Trena Johnson did complete goal from last treatment day identifying gaining  emotional wellness  did not present with any barriers to learning  Mariajose Johnson [id number] engaged throughout the treatment day  Was engaged in learning related to [employer]  Staff utilized Verbal, Written, A/V, and Demonstration teaching methods  Trena Johnson shared area of learning and set a goal for outside of program to thank  3 times tonight        Tx Plan Objective: 1 4, Therapist:  Janell Rowland RN

## 2021-04-15 ENCOUNTER — OFFICE VISIT (OUTPATIENT)
Dept: PSYCHOLOGY | Facility: CLINIC | Age: 41
End: 2021-04-15
Payer: COMMERCIAL

## 2021-04-15 DIAGNOSIS — F33.2 SEVERE EPISODE OF RECURRENT MAJOR DEPRESSIVE DISORDER, WITHOUT PSYCHOTIC FEATURES (HCC): Primary | ICD-10-CM

## 2021-04-15 PROCEDURE — G0177 OPPS/PHP; TRAIN & EDUC SERV: HCPCS

## 2021-04-15 PROCEDURE — S0201 PARTIAL HOSPITALIZATION SERV: HCPCS

## 2021-04-15 PROCEDURE — G0410 GRP PSYCH PARTIAL HOSP 45-50: HCPCS
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
## 2021-04-15 NOTE — PSYCH
This note was not shared with the patient due to this is a psychotherapy note    Subjective:     Patient ID: Khurram Stone is a 47 y o  female  [facility] Clinical Progress Notes      Specialized Services Documentation  Therapist must complete separate progress note for each specific clinical activity in which the individual participated during the day  Group Psychotherapy [id number]    This group was facilitated virtually in a private office using [employer] and Approved [employer] Teams  Khurram Stone consented to the use of tele-video modality of treatment  Psychotherapy group focused on characteristics of healthy and unhealthy relationships  First, Khurram Stone was provided with material reviewing the differences between the two types of relationships  Second, the group explored self compassion skills to begin building self esteem, and one goal that they have for themselves regarding relationships  Khurram Stone did not share or comment during group  She did appear attentive and anxious on camera throughout  Some progress toward goals  Khurram Stone is encouraged to continue to make progress towards goals and objectives through group participation and will continue to attend psychotherapy group  Tx Plan Objective: 1 1,1 2, Therapist:  CARLOS Mccray      Education Therapy   [id number] Khurram Stone actively shared in morning assessment and goal review  Presented as Receptive related to readiness to learn  Khurram Stone did complete goal from last treatment day identifying gaining gratitude for her   did not present with any barriers to learning  Mariajose Johnson [phone] engaged throughout the treatment day  Was engaged in learning related to [employer]  Staff utilized Verbal and A/V teaching methods  Khurram Stone shared area of learning and set a 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        Tx Plan Objective: 1 1,1 2, Therapist:  CARLOS Dyer    Other: This writer contacted intake department to obtain a new patient appointment date and time for Fidel LEE Se  Currently awaiting confirmation  Discharge planned for tomorrow, 04/16/2021  [id number]- Met with Fidel LEE Se for CM  She reported not receiving prior phone calls from this writer the past week  She seemed upset that she "didn't hear from anyone " This writer informed her that she could contact as well if she needs anything between CM or after she misses appointments  Fidel LEE Se stated that she was having issues ensuring that her OP therapist will take her insurance and she learned that they will only cover teletherapy until June 30th, and that she will be responsible for fee after that  Fidel LEE Se seemed to be upset, however, she is going to keep her appointment and "feel out" if she wants to continue with this therapist as she stated "left a bad taste in her mouth already " Fidel LEE Se reiterated that she always has the choice to locate other providers in the future, and encouraged to follow the process she did while she was here if that's what she decides  Discharge tomorrow, 04/16/2021  Fidel LEE Se denied SI, HI or psychosis  Case Management Note    CARLOS Dyer    Current suicide risk : [address] denies SI, plan or intent    Medications changes/added/denied? No    Treatment session number: 19    Individual Case Management Visit provided today?  Yes     [facility] follow up physician's orders: n/a

## 2021-04-15 NOTE — PSYCH
Subjective:     Patient ID: Khurram Stone is a 47 y o  female  [facility] Clinical Progress Notes      Specialized Services Documentation  Therapist must complete separate progress note for each specific clinical activity in which the individual participated during the day  This was not shared due to this is a psychotherapy note  GROUP PSYCHOTHERAPY ([id number]) Group was facilitated virtually in a private office using HIPAA Compliant and Approved [employer] Teams  Hoffman  consented to the use of tele-video modality of treatment and was virtually present for group psychotherapy today  The group engaged in education about challenging anxious thoughts  The group practiced by stating one common situation that provokes anxiety and then provided examples of worst outcome, best outcome, and likely outcome of the situation  Hoffmantaylor Hannah seemed fairly engaged throughout the group session  Dalton Hannah stated that the one common situation that makes them anxious is communicating with her family  Dalton Hannah is encouraged to make progress towards goals and objectives through group participation and will continue to attend psychotherapy group       Tx plan objective: 1 1, 1 2   Therapist: Robson Aguilar MA

## 2021-04-15 NOTE — PSYCH
This note was not shared with the patient due to this is a psychotherapy note    Subjective:     Patient ID: Amie Whiting is a 47 y o  female  [facility] Clinical Progress Notes      Specialized Services Documentation  Therapist must complete separate progress note for each specific clinical activity in which the individual participated during the day  Group Psychotherapy     ([id number]) Amie Whiting  attended psychotherapy group entitled strength-spotting, to encourage participants to recognize character strengths about themselves and others  Group members received a handout on different types of strengths to help guide the discussion  Individual members took turns sharing with the group a positive success story  Peers then discussed strengths displayed in the story  Chaya Barrera appropriate and supportive to peers  At closing the group reflected on what was learned and patterns noticed in feedback  Good progress towards goal noted  Continue psychoeducational groups to encourage recognizing individual strengths        Tx Plan Objective: 1 4 Therapist:  Marielos Gonzalez RN

## 2021-04-16 ENCOUNTER — OFFICE VISIT (OUTPATIENT)
Dept: PSYCHOLOGY | Facility: CLINIC | Age: 41
End: 2021-04-16
Payer: COMMERCIAL

## 2021-04-16 DIAGNOSIS — F33.2 SEVERE EPISODE OF RECURRENT MAJOR DEPRESSIVE DISORDER, WITHOUT PSYCHOTIC FEATURES (HCC): Primary | ICD-10-CM

## 2021-04-16 DIAGNOSIS — F41.1 GENERALIZED ANXIETY DISORDER: ICD-10-CM

## 2021-04-16 PROCEDURE — S0201 PARTIAL HOSPITALIZATION SERV: HCPCS

## 2021-04-16 PROCEDURE — G0410 GRP PSYCH PARTIAL HOSP 45-50: HCPCS

## 2021-04-16 PROCEDURE — G0177 OPPS/PHP; TRAIN & EDUC SERV: HCPCS

## 2021-04-16 NOTE — PSYCH
This note was not shared with the patient due to this is a psychotherapy note   Subjective:     Patient ID: Negrito Kohli is a 47 y o  female  [facility] Clinical Progress Notes      Specialized Services Documentation  Therapist must complete separate progress note for each specific clinical activity in which the individual participated during the day  GROUP PSYCHOTHERAPY ([id number]) Group was facilitated virtually in a private office using HIPAA Compliant and Approved [employer] Teams  Negrito Kohli consented to the use of tele-video modality of treatment and was virtually present for group psychotherapy today  The group engaged in the wellness assessment that evaluates progress on several different areas of wellness: physical, emotional, cognitive, vocational, social and spiritual  Clients rated their progress and discussed areas that need work  Liam Martin continues to make progress towards goals through verbal participation in group  Continue with psychotherapy  [facility] Objectives: 1 1, 1 2, 1 4  Therapist: Braulio Jones [place]     Education Therapy   [id number] Negrito Kohli actively shared in morning assessment and goal review  Presented as Receptive related to readiness to learn  Negrito Kohli did complete goal from last treatment day identifying gaining awareness and limits  did not present with any barriers to learning  Mariajose Johnson [phone] engaged throughout the treatment day  Was engaged in learning related to Illness, Medication, Aftercare and Wellness Tools  Staff utilized Verbal, Written, A/V and Demonstration teaching methods  Negrito Kohli shared area of learning and set a goal for outside of program to continue recovery and be an advocate        Tx Plan Objective: 1 1, 1 2, 1 4 Therapist:  Luc Villar MA, [place]

## 2021-04-16 NOTE — PSYCH
This note was not shared with the patient due to this is a psychotherapy note    Subjective:     Patient ID: Arcenio Cook is a 47 y o  female  [facility] Clinical Progress Notes      Specialized Services Documentation  Therapist must complete separate progress note for each specific clinical activity in which the individual participated during the day  Group Psychotherapy Life Skills Group (10:25-11:10) Arcenio Cook actively engaged in group focused on hope which was facilitated virtually in a private office using HIPAA Compliant and Approved [employer] Teams  Arcenio Cook consented to the use of tele-video modality of treatment and was virtually present for group psychotherapy today  Group members discussed what hope is to them and what causes them to lose hope  Group members were asked to think about what symbolizes hope for them  Clients miles pictures and shared what their symbol of hope is and what it means to them  Clients symbol of hope is her family and business  Clients discussed ways to cultivate hope in their life   Arcenio Cook continues to make progress towards goals through verbal participation in group;will discharge after treatment program Tx Plan Objective: 1 1,1 2 Therapist: Phi Vazquez

## 2021-04-16 NOTE — PSYCH
This note was not shared with the patient due to this is a psychotherapy note    Subjective:     Patient ID: Karen Correa is a 47 y o  female  [facility] Clinical Progress Notes      Specialized Services Documentation  Therapist must complete separate progress note for each specific clinical activity in which the individual participated during the day  Other: [id number]    This group was facilitated virtually in a private office using [employer] and Approved [employer] Teams  Karen Correa consented to the use of tele-video modality of treatment  Met with Karen Correa and reviewed her discharge paperwork  Reviewed her relapse prevention plan which included warning signs, coping skills, and support people she has  Lorin Barrera and this writer reviewed medications and her aftercare appointments  She identified learning, most importantly, that she is not alone  Karen Correa and this writer reviewed her implementation of skill and how she has utilized mindfulness  Karen Correa denied SI, HI, SIB and psychosis  Denied issues with medications  Discharge summary to be sent to OP providers  [phone]) This writer sent in basket message to [employer] regarding medication management appointment -   "My name is Chloe,  at [facility]  My supervisor encouraged me to reach out to you  I have this patient, Ted Rivas who is discharging today and I didn't know or did not remember, that the ladies up front couldn't schedule her because she's technically not a "new patient " She has been in the program for 20 days, seeing Sukhdev Cox PA-C, for medication management weekly  She agreed to keep her on and she asked me this morning to ask you to have her put on her schedule for May 3 at 1130 am  She had already had her on her schedule but from my understanding it needs to go through intake first now  Please let me know if there is anything else I need to complete   I already put a request in to the intake department yesterday after I learned about the new policy  We need to have a confirmed appointment for med management before discharging from the program  I'm sorry for any inconvenience this may cause "      Case Management Note    CARLOS Doty    Current suicide risk : [address] denies SI, plan or intent    Medications changes/added/denied? No    Treatment session number: 20    Individual Case Management Visit provided today?  Yes     [facility] follow up physician's orders: Discharge today
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
## 2021-04-16 NOTE — PSYCH
Subjective:     Patient ID: Ramone Macias is a 47 y o  female  [facility] Clinical Progress Notes      Specialized Services Documentation  Therapist must complete separate progress note for each specific clinical activity in which the individual participated during the day  (9:30-10:15)Group was facilitated virtually in a private office using HIPAA Compliant and Approved [employer] Teams  Chaya WINKLER  Tatijoseluisjennifer consented to the use of tele-video modality of treatment and was virtually present for group psychotherapy today  This group was on self compassion  Participants paused for a brief self compassion meditation followed by a  prompt to visualize how they would speak to a friend who was in crisis verse the way the way they speak to one another  Participants were asked to examine their self talk and take note of times during the day when they are being critical  What words and tone do they use with themselves? Participants were asked to reflect on this throughout the day and to correct the negative tone and words when this occurs  Participants viewed a short clip on Self Compassion, from [employer] and then engaged in conversation about some of the main points  Lastly, participants were asked to complete several mindful journal prompts about living intentionally to get participants in touch with things that they love about themselves, life in general, their biggest victories and successes and ways they can take better care of themselves  Francisco Osorio was attentive throughout this group and shared in group discussion  She shared ways she is compassionate with others verse the ways she speaks to herself when she is struggling with something  This note was not shared with the patient due to this is a psychotherapy note      Tx Goal Objective: 1 1,1 2  Therapist: DINO Jonas  Group Psychotherapy
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
## 2021-04-16 NOTE — PSYCH
Assessment/Plan:      Severe episode of recurrent major depressive disorder, without psychotic features     Generalized anxiety disorder     Subjective:      Patient ID: Chaya Barrera is a 47 y  o  female      [facility] Treatment Plan     AREAS OF NEED: Depression as evidenced by; excessive tearfulness, frequent crying spells, lack of motivation, hopelessness     Date Initiated: 03/17/2021     Strengths: Supportive mother and , loves her children        LONG TERM GOAL:      Date Initiated: 03/17/2021     1 0  To decrease depressive symptoms by implementing effective coping skills, from baseline 8/10, to 5/10     Target Date: 04/28/2021     Completion Date: on-going           SHORT TERM OBJECTIVES:      Date Initiated: 03/17/2021     1 1 I will identify 3 positive affirmations I can begin to recite to increase my own confidence and utilize them as I complete at least 1 task daily       Revision Date: 03/26/2021, 04/12/2021     Target Date: 03/26/2021     Completion Date: 04/16/2021 Jacobo Man has been utilizing positive affirmations daily and sharing them voluntarily to the group unprompted   She often shared positive outcomes by using this skill)         Date Initiated: 03/17/2021     1 2 I will identify 3 things I need to do to take care of MYSELF on a daily basis and begin to do them     Revision Date: 03/26/2021, 04/12/2021     Target Date: 03/26/2021     Completion Date:  on-going (self-care is an on-going process and Carlyn Trinh has continously attempted to make time for herself when she can, even if it is briefly )        Date Initiated: 03/17/2021     1 3 I will take medications as prescribed and share questions and concerns if arise       Revision Date: 03/26/2021, 04/12/2021     Target Date: 03/26/2021     Completion Date: on-going        Date Initiated: 03/17/2021     1 4 I will identify 3 ways my supports can assist in my recovery and agree to staff/support contact as indicated       Revision Date: 03/26/2021, 04/12/2021     Target Date: 03/26/2021     Completion Date: on-going                7 DAY REVISION:     Date Initiated: 03/26/2021     1 5 I will identify 3 skills I am learning in program to manage my anxiety in the moment and consistently use to support my ongoing wellness Lindsey Wooten identified using her 5 senses, utilizing her supports and using positive affirmations to manage anxiety and negative thoughts )     Revision Date: 04/12/2021     Target Date: 04/05/2021     Completion Date: 04/16/2021        Date initiated: 04/12/2021     1 6 I will identify 3 skills I need to continue to use to support my ongoing wellness as I prepare for discharge to OP level of care  (As mentioned above)     Revision Date: 04/16/2021     Target Date: 04/21/2021     Completion Date: 04/16/2021           PSYCHIATRY:  Date Initiated: 03/17/2021     Medication management and education     Revision Date: 03/26/2021, 04/12/2021     The person(s) responsible for carrying out the plan is Dr Himanshu Gaines MD        NURSING:   Date Initiated: 03/17/2021     1 1,1 2,1 4 RN will provide daily wellness group five days weekly to [facility] S/S of her diagnosis and medications used in treatment    1 1,1 2,1 3,1 4,1 5 RN will continue to provide daily wellness group five days weekly to [facility] her S/S of his diagnosis and medications used in treatment       Revision Date: 03/26/2021, 04/12/2021         The person(s) responsible for carrying out the plan is Calista Alexandre RN        PSYCHOLOGY:   Date Initiated: 03/17/2021     1 1,1 2,1 4 Provide psychotherapy group five times per week to allow opportunity for Chaya Barrera to explore stressors and ways of coping  1 1,1 2,1 4,Continue to provide psychotherapy group three times per week to allow opportunity for Chaya Barrera and encourage sharing of stressors, skills and positive change      Revision Date: 03/26/2021, 04/12/2021     The person(s) responsible for carrying out the plan DINO Payton and Braulio Alcantara        ALLIED THERAPY:   Date Initiated: 03/17/2021     Revision Date: 03/26/2021, 04/12/2021     1 1,1 2 Engage Chayamarisel Barrera in AT group five times weekly to encourage development and use of wellness tools to decrease symptoms and promote recovery through meaningful activity      The person(s) responsible for carrying out the plan is HERLINDA Bajwa        CASE MANAGEMENT:   Date Initiated: 03/17/2021     Revision Date: 03/26/2021         1 0 Continue to engage in individual case management 3-4 times weekly, to discuss and        prepare for aftercare services, discuss progress and other community resources   UR contact    as necessary      The person(s) responsible for carrying out the plan is DINO Sánchez        TREATMENT REVIEW/COMMENTS:      DISCHARGE CRITERIA: Identify 3 signs of progress and complete relapse prevention plan       DISCHARGE PLAN: OP therapy     Estimated Length of Stay: 10 treatment days

## 2021-04-16 NOTE — PSYCH
[facility] Discharge Instructions:     Disposition: home     Address: [address] [place] [id number]    Diagnosis:   Encounter Diagnoses   Name Primary?  Severe episode of recurrent major depressive disorder, without psychotic features ([address] ) Yes    Generalized anxiety disorder      Allergies (Drug/Food): Allergies   Allergen Reactions    Gluten Meal - Food Allergy Abdominal Pain    Lactose - Food Allergy     Oxycodone-Acetaminophen      Other reaction(s): SUICIDAL THOUGHTS  Reaction Date: [id number];     Tramadol Itching     Activity: no restrictions     Diet:no recommendations     Smoking Cessation:not a smoker      Diagnostic/Laboratory Orders: n/a    Vaccines: If you received a vaccine, please notify your family physician on your next visit  For more information, please call [phone]  Follow-up appointments/Referrals:     Barbie Woo PA-C  Medication management  05/03/2021 at 1135  (p) [phone]  (f) [phone]     Katie Guerrero PsyD  OP therapy  04/23/2021 at 47 (p) [phone]     Dr Jose C Rivera MD  PCP  06/15/2021 at [phone]  (S) [phone]  (f) [phone]      ICM/CTT: [facility]: Rodrigo [phone] and Divya [phone]  Intake/Referral/Evaluation (Non-Emergency) *NON INSURED FOR FUNDING: [facility]: [phone], [facility]: [phone], [facility]: [phone] and [place]: [phone]  Crisis Intervention (Emergency) [place] Service: [facility]: [phone], [place]: [phone], [address]: [phone], [facility]): [phone], [facility]: [phone] and [facility]: [phone]  _________________________________  National Crisis Intervention Hotline: 4-380-228-369-030-9728  National Suicide Crisis Hotline: 5-718.415.5119  I, the undersigned, have received and understand the above instructions          Patient/Rep Signature: __________________________________ Date/Time: ______________         Relationship: __________________________________________       Date/Time: ______________         Physician Signature: ____________________________________      Date/Time: ______________               Signature: ________________________________       Date/Time: ______________

## 2021-04-20 ENCOUNTER — TELEPHONE (OUTPATIENT)
Dept: PSYCHIATRY | Facility: CLINIC | Age: 41
End: 2021-04-20

## 2021-05-03 ENCOUNTER — OFFICE VISIT (OUTPATIENT)
Dept: PSYCHIATRY | Facility: CLINIC | Age: 41
End: 2021-05-03
Payer: COMMERCIAL

## 2021-05-03 DIAGNOSIS — F41.1 GENERALIZED ANXIETY DISORDER: ICD-10-CM

## 2021-05-03 DIAGNOSIS — F33.2 SEVERE EPISODE OF RECURRENT MAJOR DEPRESSIVE DISORDER, WITHOUT PSYCHOTIC FEATURES (HCC): Primary | ICD-10-CM

## 2021-05-03 DIAGNOSIS — F43.10 PTSD (POST-TRAUMATIC STRESS DISORDER): ICD-10-CM

## 2021-05-03 PROCEDURE — 99203 OFFICE O/P NEW LOW 30 MIN: CPT | Performed by: PHYSICIAN ASSISTANT

## 2021-05-03 RX ORDER — DIVALPROEX SODIUM 125 MG/1
125 CAPSULE, COATED PELLETS ORAL
Qty: 90 CAPSULE | Refills: 0 | Status: SHIPPED | OUTPATIENT
Start: 2021-05-03 | End: 2021-05-17 | Stop reason: ALTCHOICE

## 2021-05-03 RX ORDER — BUSPIRONE HYDROCHLORIDE 15 MG/1
15 TABLET ORAL
Qty: 30 TABLET | Refills: 1 | Status: SHIPPED | OUTPATIENT
Start: 2021-05-03 | End: 2021-06-16 | Stop reason: SINTOL

## 2021-05-03 RX ORDER — BUPROPION HYDROCHLORIDE 300 MG/1
300 TABLET ORAL EVERY MORNING
Qty: 90 TABLET | Refills: 0 | Status: SHIPPED | OUTPATIENT
Start: 2021-05-03 | End: 2021-06-16 | Stop reason: SDUPTHER
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
## 2021-05-13 ENCOUNTER — TELEPHONE (OUTPATIENT)
Dept: PSYCHIATRY | Facility: CLINIC | Age: 41
End: 2021-05-13

## 2021-05-17 ENCOUNTER — TELEMEDICINE (OUTPATIENT)
Dept: PSYCHIATRY | Facility: CLINIC | Age: 41
End: 2021-05-17
Payer: COMMERCIAL

## 2021-05-17 DIAGNOSIS — F41.1 GENERALIZED ANXIETY DISORDER: ICD-10-CM

## 2021-05-17 DIAGNOSIS — F33.2 SEVERE EPISODE OF RECURRENT MAJOR DEPRESSIVE DISORDER, WITHOUT PSYCHOTIC FEATURES (HCC): Primary | ICD-10-CM

## 2021-05-17 DIAGNOSIS — F43.10 PTSD (POST-TRAUMATIC STRESS DISORDER): ICD-10-CM

## 2021-05-17 PROCEDURE — 99213 OFFICE O/P EST LOW 20 MIN: CPT | Performed by: PHYSICIAN ASSISTANT
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
## 2021-05-25 DIAGNOSIS — F43.10 PTSD (POST-TRAUMATIC STRESS DISORDER): ICD-10-CM

## 2021-05-25 DIAGNOSIS — F41.1 GENERALIZED ANXIETY DISORDER: ICD-10-CM

## 2021-05-25 RX ORDER — BUSPIRONE HYDROCHLORIDE 15 MG/1
TABLET ORAL
Qty: 30 TABLET | Refills: 1 | OUTPATIENT
Start: 2021-05-25

## 2021-05-26 DIAGNOSIS — M79.7 FIBROMYALGIA: ICD-10-CM

## 2021-05-26 RX ORDER — MILNACIPRAN HYDROCHLORIDE 50 MG/1
TABLET, FILM COATED ORAL
Qty: 180 TABLET | Refills: 1 | Status: SHIPPED | OUTPATIENT
Start: 2021-05-26 | End: 2021-09-13 | Stop reason: CLARIF

## 2021-05-27 DIAGNOSIS — M79.7 FIBROMYALGIA: Primary | ICD-10-CM

## 2021-05-27 RX ORDER — METHYLPREDNISOLONE 4 MG/1
TABLET ORAL
Qty: 21 EACH | Refills: 0 | Status: SHIPPED | OUTPATIENT
Start: 2021-05-27 | End: 2021-08-03

## 2021-05-28 ENCOUNTER — OFFICE VISIT (OUTPATIENT)
Dept: FAMILY MEDICINE CLINIC | Facility: CLINIC | Age: 41
End: 2021-05-28
Payer: COMMERCIAL

## 2021-05-28 VITALS
SYSTOLIC BLOOD PRESSURE: 112 MMHG | OXYGEN SATURATION: 99 % | DIASTOLIC BLOOD PRESSURE: 82 MMHG | TEMPERATURE: 97.5 F | BODY MASS INDEX: 26.01 KG/M2 | HEART RATE: 86 BPM | HEIGHT: 61 IN | WEIGHT: 137.8 LBS

## 2021-05-28 DIAGNOSIS — M54.50 LUMBAR BACK PAIN: Primary | ICD-10-CM

## 2021-05-28 DIAGNOSIS — G89.29 CHRONIC MIDLINE THORACIC BACK PAIN: ICD-10-CM

## 2021-05-28 DIAGNOSIS — R33.9 URINARY RETENTION: ICD-10-CM

## 2021-05-28 DIAGNOSIS — M54.6 CHRONIC MIDLINE THORACIC BACK PAIN: ICD-10-CM

## 2021-05-28 DIAGNOSIS — R20.0 NUMBNESS: ICD-10-CM

## 2021-05-28 PROCEDURE — 99214 OFFICE O/P EST MOD 30 MIN: CPT | Performed by: FAMILY MEDICINE

## 2021-05-28 PROCEDURE — 3008F BODY MASS INDEX DOCD: CPT | Performed by: PHYSICIAN ASSISTANT

## 2021-05-28 PROCEDURE — 96372 THER/PROPH/DIAG INJ SC/IM: CPT | Performed by: FAMILY MEDICINE

## 2021-05-28 RX ORDER — KETOROLAC TROMETHAMINE 30 MG/ML
60 INJECTION, SOLUTION INTRAMUSCULAR; INTRAVENOUS ONCE
Status: COMPLETED | OUTPATIENT
Start: 2021-05-28 | End: 2021-05-28

## 2021-05-28 RX ORDER — METHYLPREDNISOLONE ACETATE 80 MG/ML
80 INJECTION, SUSPENSION INTRA-ARTICULAR; INTRALESIONAL; INTRAMUSCULAR; SOFT TISSUE ONCE
Status: COMPLETED | OUTPATIENT
Start: 2021-05-28 | End: 2021-05-28

## 2021-05-28 RX ORDER — KETOROLAC TROMETHAMINE 30 MG/ML
60 INJECTION, SOLUTION INTRAMUSCULAR; INTRAVENOUS ONCE
Status: DISCONTINUED | OUTPATIENT
Start: 2021-05-28 | End: 2021-05-28

## 2021-05-28 RX ADMIN — METHYLPREDNISOLONE ACETATE 80 MG: 80 INJECTION, SUSPENSION INTRA-ARTICULAR; INTRALESIONAL; INTRAMUSCULAR; SOFT TISSUE at 12:08

## 2021-05-28 RX ADMIN — KETOROLAC TROMETHAMINE 60 MG: 30 INJECTION, SOLUTION INTRAMUSCULAR; INTRAVENOUS at 12:49

## 2021-05-28 NOTE — ASSESSMENT & PLAN NOTE
Due to abnormal neurologic findings on exam and degree of pain, would recommend MRI to further evaluate for possible intervention including but not limited to rhizotomy, epidural, or corticosteroid injections  XR 07/2020, PT 08/2020, recurrent pain with NEW +numbness   ER precautions reviewed

## 2021-05-28 NOTE — PROGRESS NOTES
Marcelina Bowling 1980 female MRN: [id number]    Acute Visit    Assessment/Plan   Lumbar back pain  Due to abnormal neurologic findings on exam and degree of pain, would recommend MRI to further evaluate for possible intervention including but not limited to rhizotomy, epidural, or corticosteroid injections  XR 07/2020, PT 08/2020, recurrent pain with NEW +numbness   ER precautions reviewed      Maritza Boss was seen today for leg pain and back pain  Diagnoses and all orders for this visit:    Lumbar back pain  -     MRI lumbar spine wo contrast; Future  -     MRI thoracic spine wo contrast; Future  -     methylPREDNISolone acetate (DEPO-MEDROL) injection 80 mg  -     Discontinue: ketorolac (TORADOL) injection 60 mg    Chronic midline thoracic back pain  -     MRI lumbar spine wo contrast; Future  -     MRI thoracic spine wo contrast; Future  -     methylPREDNISolone acetate (DEPO-MEDROL) injection 80 mg  -     Discontinue: ketorolac (TORADOL) injection 60 mg  -     ketorolac (TORADOL) 60 mg/2 mL IM injection 60 mg    Numbness  -     MRI lumbar spine wo contrast; Future  -     MRI thoracic spine wo contrast; Future    Urinary retention  -     MRI lumbar spine wo contrast; Future  -     MRI thoracic spine wo contrast; Future  -     UA w Reflex to Microscopic w Reflex to Culture -Lab Collect        Dolores Alcantar MD  [address]  5/28/2021      Please be aware that this note contains text that was dictated and there may be errors pertaining to "sound-alike "words during the dictation process  SUBJECTIVE    CC: Leg Pain and Back Pain    HPI:  Marcelina Bowling is a 46 y o  female who presented for an acute visit complaining of flare of chronic back pain  Since Monday, patient has had increased back pain in midline thoracic/lumbar spine, radiating down into buttocks, bilateral legs to the feet  She feels both feet are numb   Also for the last few days, she has had a difficult time urinating (feels urge to go, but difficulty initiating stream and has to push)  Denies dysuria, malodor  Denies saddle anesthesia or urinary/fecal incontinence  She's been stretching, heat/ice, OTC analgesia, without relief  Last year she had XR of the lumbar showing degenerative changes and retrolisthesis  She did have a PT program established 08/2020 with [facility] following this with mild response at that time  No injury  Worst pain when trying to stand upright or sitting with legs dangling, best when laying down with feet supported and hips flexed  Review of Systems   Constitutional: Positive for fatigue  Negative for chills, fever and unexpected weight change  HENT: Negative for congestion  Respiratory: Negative for cough  Musculoskeletal: Positive for arthralgias, back pain and myalgias  Negative for joint swelling  Neurological: Positive for weakness and numbness  Psychiatric/Behavioral: Positive for dysphoric mood  All other systems reviewed and are negative      Medications:   Meds/Allergies   Current Outpatient Medications   Medication Sig Dispense Refill    albuterol (PROVENTIL HFA,VENTOLIN HFA) 90 mcg/act inhaler Inhale 2 puffs every 6 (six) hours as needed for wheezing or shortness of breath 1 Inhaler 5    baclofen 10 mg tablet Take 1 tablet (10 mg total) by mouth 3 (three) times a day as needed for muscle spasms 90 tablet 1    buPROPion (WELLBUTRIN XL) 300 mg 24 hr tablet Take 1 tablet (300 mg total) by mouth every morning 90 tablet 0    busPIRone (BUSPAR) 15 mg tablet Take 1 tablet (15 mg total) by mouth daily at bedtime 30 tablet 1    cyclobenzaprine (FLEXERIL) 10 mg tablet Take 1 tablet (10 mg total) by mouth daily at bedtime 30 tablet 3    hyoscyamine (ANASPAZ,LEVSIN) 0 125 MG tablet Take 1 tablet (0 125 mg total) by mouth every 4 (four) hours as needed for cramping 30 tablet 0    ibuprofen (MOTRIN) 200 mg tablet Take by mouth every 6 (six) hours as needed for mild pain      meclizine (ANTIVERT) 12 5 MG tablet Take 1 tablet (12 5 mg total) by mouth 3 (three) times a day as needed for dizziness 30 tablet 0    neomycin-polymyxin-hydrocortisone (CORTISPORIN) 0 35%-10,000 units/mL-1% otic suspension Administer 4 drops into both ears 2 (two) times a day for 5 days 10 mL 0    Restasis 0 05 % ophthalmic emulsion       Savella 50 MG TABS TAKE 1 TABLET BY MOUTH TWICE A  tablet 1    methylPREDNISolone 4 MG tablet therapy pack Use as directed on package (Patient not taking: Reported on 5/28/2021) 21 each 0     No current facility-administered medications for this visit  Allergies   Allergen Reactions    Gluten Meal - Food Allergy Abdominal Pain    Lactose - Food Allergy     Oxycodone-Acetaminophen      Other reaction(s): SUICIDAL THOUGHTS  Reaction Date: [id number];     Tramadol Itching     OBJECTIVE    Vitals:   Vitals:    05/28/21 1130   BP: 112/82   Pulse: 86   Temp: 97 5 °F (36 4 °C)   SpO2: 99%   Weight: 62 5 kg (137 lb 12 8 oz)   Height: 5' 1" (1 549 m)     Physical Exam  Vitals signs and nursing note reviewed  Constitutional:       General: She is not in acute distress  Appearance: Normal appearance  She is well-developed  She is not ill-appearing or diaphoretic  HENT:      Head: Normocephalic and atraumatic  Right Ear: External ear normal       Left Ear: External ear normal       Nose: Nose normal    Eyes:      General: Lids are normal       Extraocular Movements:      Right eye: No nystagmus  Left eye: No nystagmus  Conjunctiva/sclera: Conjunctivae normal       Pupils: Pupils are equal, round, and reactive to light  Neck:      Vascular: No JVD  Trachea: No tracheal deviation  Pulmonary:      Effort: No accessory muscle usage or respiratory distress     Musculoskeletal:      Comments: Tender to palpation bilateral paraspinal muscles from T5 down  TTP over spinal processes approx L1-L4  TTP of sciatic tract in glutes bl  2+ reflex at patella and achilles bilaterally  Microfilament testing to feet normal EXCEPT: plantar surface of left great toe, left 1st MTJ plantar   Skin:     Findings: No rash  Neurological:      Mental Status: She is alert and oriented to person, place, and time  GCS: GCS eye subscore is 4  GCS verbal subscore is 5  GCS motor subscore is 6  Cranial Nerves: No cranial nerve deficit  Sensory: No sensory deficit  Motor: No tremor  Gait: Gait normal       Deep Tendon Reflexes: Reflexes are normal and symmetric  Reflex Scores:       Patellar reflexes are 2+ on the right side and 2+ on the left side

## 2021-06-16 ENCOUNTER — TELEMEDICINE (OUTPATIENT)
Dept: PSYCHIATRY | Facility: CLINIC | Age: 41
End: 2021-06-16
Payer: COMMERCIAL

## 2021-06-16 DIAGNOSIS — F41.1 GENERALIZED ANXIETY DISORDER: Primary | ICD-10-CM

## 2021-06-16 DIAGNOSIS — F43.10 PTSD (POST-TRAUMATIC STRESS DISORDER): ICD-10-CM

## 2021-06-16 DIAGNOSIS — F33.2 SEVERE EPISODE OF RECURRENT MAJOR DEPRESSIVE DISORDER, WITHOUT PSYCHOTIC FEATURES (HCC): ICD-10-CM

## 2021-06-16 PROCEDURE — 1036F TOBACCO NON-USER: CPT | Performed by: PHYSICIAN ASSISTANT

## 2021-06-16 PROCEDURE — 99213 OFFICE O/P EST LOW 20 MIN: CPT | Performed by: PHYSICIAN ASSISTANT

## 2021-06-16 RX ORDER — BUPROPION HYDROCHLORIDE 300 MG/1
300 TABLET ORAL EVERY MORNING
Qty: 90 TABLET | Refills: 0 | Status: SHIPPED | OUTPATIENT
Start: 2021-06-16 | End: 2021-11-22 | Stop reason: SDUPTHER

## 2021-06-17 NOTE — PSYCH
Virtual Regular Visit            Encounter provider Sarmad Walker PA-C (R)    Provider located at [address] [address] [phone]      Recent Visits  No visits were found meeting these conditions  Showing recent visits within past 7 days and meeting all other requirements  Future Appointments  No visits were found meeting these conditions  Showing future appointments within next 150 days and meeting all other requirements       The patient was identified by name and date of birth  Marcelina Bowling was informed that this is a telemedicine visit and that the visit is being conducted through [address] [facility] and patient was informed that this is a secure, HIPAA-compliant platform  She agrees to proceed     My office door was closed  No one else was in the room  She acknowledged consent and understanding of privacy and security of the video platform  The patient has agreed to participate and understands they can discontinue the visit at any time  Patient is aware this is a billable service  VIRTUAL VISIT DISCLAIMER    Marcelina Bowling acknowledges that she has consented to an online visit or consultation  She understands that the online visit is based solely on information provided by her, and that, in the absence of a face-to-face physical evaluation by the physician, the diagnosis she receives is both limited and provisional in terms of accuracy and completeness  This is not intended to replace a full medical face-to-face evaluation by the physician  Marcelina Bowling understands and accepts these terms        MEDICATION MANAGEMENT NOTE        [address]  [facility]  [address] [facility] [address]  [phone]        Name and Date of Birth:  Marcelina Bowling 46 y o  1980    Date of Visit: June 16, 2021  SUBJECTIVE:     Dana Bridges seen by [facility] 5/17 at which time meds unchanged  Dana Bridges continues in ind therapy weekly  Is coming out of a brief period of low mood precipitated by problems with her legs after she tried to increase her walking exercise to running  Says her legs were giving out and she really can't run  Restarted walking 2 days ago and feeling better  Was disappointed in her physical limitation  During this time had some thoughts of cutting herself but did not  Dana Bridges states her anxiety has been increased lately  Has been going out but anxious regarding covid despite being vaccinated and wearing a mask  Was able to be with her friends at outdoor restaurant  Although anxious initially, this dissipated and she was able to enjoy the evening  Has not taken buspar in 2 weeks or so  Was only on 15 mg once a day secondary to tolerance issues  Could not tolerate 15 mg at bedtime- daytime lethargy and GI upset at night  Is also on savella 50 mg bid by PCP  Is looking forward to going to [facility] for month of July  Parents have bought a home there  Review of Systems   Constitutional: Negative for appetite change  Neurological: Negative for headaches  Vertigo- unchanged and had prior to wellbutrin xl   Psychiatric/Behavioral: Negative for sleep disturbance         Psychiatric, medical, social and trauma/loss hx reviewed        OBJECTIVE:     MENTAL STATUS EXAM  Appearance:  age appropriate, dressed casually   Behavior:  Pleasant & cooperative   Speech:  Normal volume, regular rate and rhythm   Mood:  euthymic; mildly anxious   Affect:  mood congruent   Language: intact and appropriate for age, education, and intellect   Thought Process:  goal directed   Associations: intact associations   Thought Content:  normal and appropriate   Perceptual Disturbances: no auditory or visual hallcunations   Risk Potential / Abnormal Thoughts: Suicidal ideation - None  Homicidal ideation - None  Potential for aggression - No       Consciousness:  Alert & Awake   Sensorium:  Grossly oriented   Attention: attention span and concentration are age appropriate       Fund of Knowledge:  Memory: awareness of current events: yes  recent and remote memory grossly intact   Insight:  good   Judgment: good       Lab Review: I have reviewed all pertinent labs    Lab Results   Component Value Date    CHOLESTEROL 185 06/24/2020     Lab Results   Component Value Date    HDL 43 06/24/2020     Lab Results   Component Value Date    TRIG 128 06/24/2020       Lab Results   Component Value Date     11/19/2015    SODIUM 139 06/24/2020    K 4 2 06/24/2020     06/24/2020    CO2 28 06/24/2020    ANIONGAP 7 11/19/2015    AGAP 8 06/24/2020    BUN 14 06/24/2020    CREATININE 0 78 06/24/2020    GLUC 98 08/02/2018    GLUF 94 06/24/2020    CALCIUM 8 6 06/24/2020    AST 10 06/24/2020    ALT 20 06/24/2020    ALKPHOS 68 06/24/2020    PROT 7 5 11/19/2015    TP 7 1 06/24/2020    BILITOT 0 50 11/19/2015    TBILI 0 23 06/24/2020    EGFR 97 06/24/2020     Lab Results   Component Value Date    WBC 10 51 (H) 06/24/2020    HGB 13 5 06/24/2020    HCT 42 1 06/24/2020    MCV 96 06/24/2020     06/24/2020     Lab Results   Component Value Date    FTAZNLFH91 205 06/24/2020       Lab Results   Component Value Date    BHS8ZRSFRRSQ 1 390 06/24/2020           ASSESSMENT & PLAN          Diagnoses and all orders for this visit:    Generalized anxiety disorder    Severe episode of recurrent major depressive disorder, without psychotic features ([facility] [address] )  -     buPROPion (WELLBUTRIN XL) 300 mg 24 hr tablet;  Take 1 tablet (300 mg total) by mouth every morning    PTSD (post-traumatic stress disorder)      Current Outpatient Medications   Medication Sig Dispense Refill    albuterol (PROVENTIL HFA,VENTOLIN HFA) 90 mcg/act inhaler Inhale 2 puffs every 6 (six) hours as needed for wheezing or shortness of breath 1 Inhaler 5    baclofen 10 mg tablet Take 1 tablet (10 mg total) by mouth 3 (three) times a day as needed for muscle spasms 90 tablet 1    buPROPion (WELLBUTRIN XL) 300 mg 24 hr tablet Take 1 tablet (300 mg total) by mouth every morning 90 tablet 0    cyclobenzaprine (FLEXERIL) 10 mg tablet Take 1 tablet (10 mg total) by mouth daily at bedtime 30 tablet 3    hyoscyamine (ANASPAZ,LEVSIN) 0 125 MG tablet Take 1 tablet (0 125 mg total) by mouth every 4 (four) hours as needed for cramping 30 tablet 0    ibuprofen (MOTRIN) 200 mg tablet Take by mouth every 6 (six) hours as needed for mild pain      meclizine (ANTIVERT) 12 5 MG tablet Take 1 tablet (12 5 mg total) by mouth 3 (three) times a day as needed for dizziness 30 tablet 0    methylPREDNISolone 4 MG tablet therapy pack Use as directed on package (Patient not taking: Reported on 5/28/2021) 21 each 0    neomycin-polymyxin-hydrocortisone (CORTISPORIN) 0 35%-10,000 units/mL-1% otic suspension Administer 4 drops into both ears 2 (two) times a day for 5 days 10 mL 0    Restasis 0 05 % ophthalmic emulsion       Savella 50 MG TABS TAKE 1 TABLET BY MOUTH TWICE A  tablet 1     No current facility-administered medications for this visit  Plan:       Stable  Continue wellbutrin xl 300 mg/d  Will stop buspar as Aaron Awan has not taken in 2 weeks and does not tolerate 15 mg/d  She continues on savella 50 mg bid from PCP  Reviewed risks, benefits, side effects of medications, including no medication  Patient understands and agrees to treatment plan  Cont f/u with ind therapist  F/u Patricia in August, sooner prn     Patient has been informed of 24 hours and weekend coverage for urgent situations accessed by calling the main clinic phone number       Kiet Koenig PA-C

## 2021-06-21 ENCOUNTER — TELEPHONE (OUTPATIENT)
Dept: FAMILY MEDICINE CLINIC | Facility: CLINIC | Age: 41
End: 2021-06-21

## 2021-06-21 ENCOUNTER — PATIENT MESSAGE (OUTPATIENT)
Dept: FAMILY MEDICINE CLINIC | Facility: CLINIC | Age: 41
End: 2021-06-21

## 2021-06-21 DIAGNOSIS — F41.9 ANXIETY: Primary | ICD-10-CM

## 2021-06-21 NOTE — TELEPHONE ENCOUNTER
----- Message -----   From: Jason Torres   Sent: 6/16/2021  12:02 PM EDT   To: [facility] [address] [facility]   Subject: Authorization Denied for Africa Murillo           Good afternoon, the above named patient is scheduled for 2 MRI's Thoracic and Lumbar on 6/26/21 at [facility]  Auth was denied for both test  As [employer] does not considered the test to be "Medically Necessary and not having enough information" Peer to peer can be done with 180 days by calling [employer] at [phone] option 1 [id number]  Clinicals were Uploaded Ofv, PT and Xray        Thank you,       Ness [facility]    [phone]

## 2021-06-21 NOTE — TELEPHONE ENCOUNTER
Called to schedule [id number] for tomorrow @ 4:15pm  Dr Nguyen Bowen  Reason for denial: missing information  Needed physical exam results  Detailed history  Face- to-face evaluation  Recent conservative treatment

## 2021-06-22 ENCOUNTER — TELEPHONE (OUTPATIENT)
Dept: FAMILY MEDICINE CLINIC | Facility: CLINIC | Age: 41
End: 2021-06-22

## 2021-06-22 RX ORDER — DIAZEPAM 5 MG/1
5 TABLET ORAL
Qty: 4 TABLET | Refills: 0 | Status: SHIPPED | OUTPATIENT
Start: 2021-06-22 | End: 2021-08-03

## 2021-06-22 NOTE — TELEPHONE ENCOUNTER
Approval numbers:  [facility]: [id number]  [facility]: [id number]    Expiration 8/21/21  Spoke to Dr Rosales Levin

## 2021-06-26 ENCOUNTER — HOSPITAL ENCOUNTER (OUTPATIENT)
Dept: MRI IMAGING | Facility: HOSPITAL | Age: 41
Discharge: HOME | End: 2021-06-26
Payer: COMMERCIAL

## 2021-06-26 DIAGNOSIS — M54.50 LUMBAR BACK PAIN: ICD-10-CM

## 2021-06-26 DIAGNOSIS — M54.6 CHRONIC MIDLINE THORACIC BACK PAIN: ICD-10-CM

## 2021-06-26 DIAGNOSIS — G89.29 CHRONIC MIDLINE THORACIC BACK PAIN: ICD-10-CM

## 2021-06-26 DIAGNOSIS — R33.9 URINARY RETENTION: ICD-10-CM

## 2021-06-26 DIAGNOSIS — R20.0 NUMBNESS: ICD-10-CM

## 2021-06-26 PROCEDURE — G1004 CDSM NDSC: HCPCS

## 2021-06-26 PROCEDURE — 72148 MRI LUMBAR SPINE W/O DYE: CPT

## 2021-06-26 PROCEDURE — 72146 MRI CHEST SPINE W/O DYE: CPT

## 2021-07-02 DIAGNOSIS — M54.50 LUMBAR BACK PAIN: ICD-10-CM

## 2021-07-02 RX ORDER — CYCLOBENZAPRINE HCL 10 MG
10 TABLET ORAL 3 TIMES DAILY PRN
Qty: 90 TABLET | Refills: 3 | Status: SHIPPED | OUTPATIENT
Start: 2021-07-02 | End: 2021-12-06 | Stop reason: SDUPTHER

## 2021-07-19 ENCOUNTER — TELEPHONE (OUTPATIENT)
Dept: FAMILY MEDICINE CLINIC | Facility: CLINIC | Age: 41
End: 2021-07-19

## 2021-07-19 DIAGNOSIS — J01.00 ACUTE NON-RECURRENT MAXILLARY SINUSITIS: Primary | ICD-10-CM

## 2021-07-19 RX ORDER — AMOXICILLIN AND CLAVULANATE POTASSIUM 875; 125 MG/1; MG/1
1 TABLET, FILM COATED ORAL EVERY 12 HOURS SCHEDULED
Qty: 14 TABLET | Refills: 0 | Status: SHIPPED | OUTPATIENT
Start: 2021-07-19 | End: 2021-07-26

## 2021-07-19 NOTE — TELEPHONE ENCOUNTER
She is in [place], can you send rx,  She thinks she has a sinus infection,  Has greenish gooey stuff from left eye,  Her left ear feels inflamed and its crusty,  Has a sore throat, nose congestion on and off,  She has only swam under water one time      [facility] [address] [place] [address]  [phone]    She will ck with pharm if no cb

## 2021-08-03 ENCOUNTER — PATIENT OUTREACH (OUTPATIENT)
Dept: FAMILY MEDICINE CLINIC | Facility: CLINIC | Age: 41
End: 2021-08-03

## 2021-08-03 ENCOUNTER — OFFICE VISIT (OUTPATIENT)
Dept: FAMILY MEDICINE CLINIC | Facility: CLINIC | Age: 41
End: 2021-08-03
Payer: COMMERCIAL

## 2021-08-03 ENCOUNTER — PATIENT MESSAGE (OUTPATIENT)
Dept: FAMILY MEDICINE CLINIC | Facility: CLINIC | Age: 41
End: 2021-08-03

## 2021-08-03 VITALS
BODY MASS INDEX: 26.62 KG/M2 | WEIGHT: 141 LBS | TEMPERATURE: 97 F | HEART RATE: 96 BPM | RESPIRATION RATE: 18 BRPM | DIASTOLIC BLOOD PRESSURE: 80 MMHG | OXYGEN SATURATION: 98 % | HEIGHT: 61 IN | SYSTOLIC BLOOD PRESSURE: 120 MMHG

## 2021-08-03 DIAGNOSIS — J32.9 RECURRENT SINUSITIS: Primary | ICD-10-CM

## 2021-08-03 DIAGNOSIS — E53.8 B12 DEFICIENCY: ICD-10-CM

## 2021-08-03 DIAGNOSIS — J01.00 ACUTE NON-RECURRENT MAXILLARY SINUSITIS: ICD-10-CM

## 2021-08-03 DIAGNOSIS — E55.9 VITAMIN D DEFICIENCY: ICD-10-CM

## 2021-08-03 DIAGNOSIS — M79.7 FIBROMYALGIA: ICD-10-CM

## 2021-08-03 DIAGNOSIS — F41.1 GENERALIZED ANXIETY DISORDER: ICD-10-CM

## 2021-08-03 DIAGNOSIS — E04.9 ENLARGED THYROID: ICD-10-CM

## 2021-08-03 DIAGNOSIS — Z00.00 ANNUAL PHYSICAL EXAM: Primary | ICD-10-CM

## 2021-08-03 DIAGNOSIS — F33.2 SEVERE EPISODE OF RECURRENT MAJOR DEPRESSIVE DISORDER, WITHOUT PSYCHOTIC FEATURES (HCC): ICD-10-CM

## 2021-08-03 DIAGNOSIS — Z91.89 AT RISK FOR DOMESTIC VIOLENCE: ICD-10-CM

## 2021-08-03 PROBLEM — L98.9 SKIN LESION: Status: RESOLVED | Noted: 2021-03-11 | Resolved: 2021-08-03

## 2021-08-03 PROBLEM — H60.543 DERMATITIS OF EAR CANAL, BILATERAL: Status: RESOLVED | Noted: 2021-03-11 | Resolved: 2021-08-03

## 2021-08-03 PROCEDURE — 99395 PREV VISIT EST AGE 18-39: CPT | Performed by: FAMILY MEDICINE

## 2021-08-03 PROCEDURE — 99214 OFFICE O/P EST MOD 30 MIN: CPT | Performed by: FAMILY MEDICINE

## 2021-08-03 PROCEDURE — 1036F TOBACCO NON-USER: CPT | Performed by: FAMILY MEDICINE

## 2021-08-03 RX ORDER — DOXYCYCLINE HYCLATE 100 MG/1
100 CAPSULE ORAL EVERY 12 HOURS SCHEDULED
Qty: 14 CAPSULE | Refills: 0 | Status: SHIPPED | OUTPATIENT
Start: 2021-08-03 | End: 2021-08-10

## 2021-08-03 RX ORDER — PREDNISONE 20 MG/1
40 TABLET ORAL DAILY
Qty: 10 TABLET | Refills: 0 | Status: SHIPPED | OUTPATIENT
Start: 2021-08-03 | End: 2021-08-08
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
## 2021-08-03 NOTE — PATIENT INSTRUCTIONS
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
## 2021-08-05 ENCOUNTER — PATIENT OUTREACH (OUTPATIENT)
Dept: FAMILY MEDICINE CLINIC | Facility: CLINIC | Age: 41
End: 2021-08-05

## 2021-08-05 ENCOUNTER — PATIENT OUTREACH (OUTPATIENT)
Dept: CASE MANAGEMENT | Facility: OTHER | Age: 41
End: 2021-08-05
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
## 2021-08-05 NOTE — PROGRESS NOTES
OP CM SW is covering for [employer] while she is out on vacation  MONIKA SINCLAIR received in basket regarding patient to see if I could follow up with Jatinder Pandya RN CM regarding needs  MONIKA SINCLAIR reached out to Kelli Yancey and she let me know that patient has not reached out to [facility] yet at this time, and was also provided with [employer] in which patient will outreach as well  York Mocha to Follow up with patient upon her return

## 2021-08-11 RX ORDER — CLINDAMYCIN HYDROCHLORIDE 300 MG/1
300 CAPSULE ORAL 4 TIMES DAILY
Qty: 20 CAPSULE | Refills: 0 | Status: SHIPPED | OUTPATIENT
Start: 2021-08-11 | End: 2021-08-16

## 2021-08-12 ENCOUNTER — PATIENT OUTREACH (OUTPATIENT)
Dept: FAMILY MEDICINE CLINIC | Facility: CLINIC | Age: 41
End: 2021-08-12
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
## 2021-08-16 ENCOUNTER — TELEPHONE (OUTPATIENT)
Dept: FAMILY MEDICINE CLINIC | Facility: CLINIC | Age: 41
End: 2021-08-16

## 2021-08-19 ENCOUNTER — PATIENT OUTREACH (OUTPATIENT)
Dept: FAMILY MEDICINE CLINIC | Facility: CLINIC | Age: 41
End: 2021-08-19

## 2021-08-19 NOTE — PROGRESS NOTES
2nd outreach attempt to patient to further assess needs and inquire if she was able to outreach [email or web address] or [facility] [place]  No answer, voicemail left, and awaiting return call

## 2021-08-26 ENCOUNTER — PATIENT OUTREACH (OUTPATIENT)
Dept: FAMILY MEDICINE CLINIC | Facility: CLINIC | Age: 41
End: 2021-08-26

## 2021-08-26 NOTE — PROGRESS NOTES
3rd outreach attempt to patient to further assess needs and inquire if she was able to outreach [employer] or [facility]   Briefly spoke with patient who stated she is in the middle of a haircut and requested OPCM SW call back next week on Wednesday 9/1 after 1PM (patient has therapy appt from 11AM-12PM NOON)    Will outreach on 9/1 per patient's request

## 2021-09-01 ENCOUNTER — PATIENT OUTREACH (OUTPATIENT)
Dept: FAMILY MEDICINE CLINIC | Facility: CLINIC | Age: 41
End: 2021-09-01
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
## 2021-09-13 ENCOUNTER — TELEPHONE (OUTPATIENT)
Dept: FAMILY MEDICINE CLINIC | Facility: CLINIC | Age: 41
End: 2021-09-13

## 2021-09-13 DIAGNOSIS — M79.7 FIBROMYALGIA: Primary | ICD-10-CM

## 2021-09-13 RX ORDER — DULOXETIN HYDROCHLORIDE 60 MG/1
60 CAPSULE, DELAYED RELEASE ORAL DAILY
Qty: 90 CAPSULE | Refills: 3 | Status: SHIPPED | OUTPATIENT
Start: 2021-09-13 | End: 2021-09-28 | Stop reason: ALTCHOICE

## 2021-09-13 NOTE — TELEPHONE ENCOUNTER
Yes- I will send in Cymbalta she should take instead  Just stop the [facility], and the next time her dose would be due take the Cymbalta instead

## 2021-09-13 NOTE — TELEPHONE ENCOUNTER
Pedro Pablo Lee is not covered by ins any longer, it's $150, can you order something comparable? Pl adv

## 2021-09-14 ENCOUNTER — TELEPHONE (OUTPATIENT)
Dept: PAIN MEDICINE | Facility: CLINIC | Age: 41
End: 2021-09-14

## 2021-09-14 ENCOUNTER — CONSULT (OUTPATIENT)
Dept: PAIN MEDICINE | Facility: CLINIC | Age: 41
End: 2021-09-14
Payer: COMMERCIAL

## 2021-09-14 VITALS
SYSTOLIC BLOOD PRESSURE: 111 MMHG | HEART RATE: 91 BPM | WEIGHT: 145 LBS | BODY MASS INDEX: 27.4 KG/M2 | DIASTOLIC BLOOD PRESSURE: 74 MMHG

## 2021-09-14 DIAGNOSIS — G89.29 CHRONIC MIDLINE THORACIC BACK PAIN: ICD-10-CM

## 2021-09-14 DIAGNOSIS — M54.50 LUMBAR BACK PAIN: ICD-10-CM

## 2021-09-14 DIAGNOSIS — G57.03 PIRIFORMIS SYNDROME OF BOTH SIDES: Primary | ICD-10-CM

## 2021-09-14 DIAGNOSIS — M54.6 CHRONIC MIDLINE THORACIC BACK PAIN: ICD-10-CM

## 2021-09-14 PROCEDURE — 99204 OFFICE O/P NEW MOD 45 MIN: CPT | Performed by: PHYSICAL MEDICINE & REHABILITATION

## 2021-09-14 NOTE — PATIENT INSTRUCTIONS
Lumbar Radiculopathy   WHAT YOU NEED TO KNOW:   Lumbar radiculopathy is a painful condition that happens when a nerve in your lumbar spine (lower back) is pinched or irritated  Nerves control feeling and movement in your body  You may have numbness or pain that shoots down from your lower back towards your foot  DISCHARGE INSTRUCTIONS:   Medicines:   · Medicines:     ? NSAIDs , such as ibuprofen, help decrease swelling, pain, and fever  This medicine is available with or without a doctor's order  NSAIDs can cause stomach bleeding or kidney problems in certain people  If you take blood thinner medicine, always ask your healthcare provider if NSAIDs are safe for you  Always read the medicine label and follow directions  ? Muscle relaxers  help decrease pain and muscle spasms  ? Opioids: This is a strong medicine given to reduce severe pain  It is also called narcotic pain medicine  Take this medicine exactly as directed by your healthcare provider  ? Oral steroids: Steroids may also be given to reduce pain and swelling  ? Take your medicine as directed  Contact your healthcare provider if you think your medicine is not helping or if you have side effects  Tell him of her if you are allergic to any medicine  Keep a list of the medicines, vitamins, and herbs you take  Include the amounts, and when and why you take them  Bring the list or the pill bottles to follow-up visits  Carry your medicine list with you in case of an emergency  Follow up with your healthcare provider or spine specialist within 1 to 3 weeks:  After your first follow-up appointment, return to your healthcare provider or spine specialist every 2 weeks until you have healed  Ask for information about physical therapy for your condition  Write down your questions so you remember to ask them during your visits  Physical therapy:  You may need physical therapy to improve your condition   Your physical therapist may teach you certain exercises to improve posture (the way you stand and sit), flexibility, and strength in your lower back  Self care:   · Stay active: It is best to be active when you have lumbar radiculopathy  Your physical therapist or healthcare provider may tell you to take walks to ease yourself back into your daily routine  Avoid long periods of bed rest  Bed rest could worsen your symptoms  Do not move in ways that increase your pain  Ask for more information about the best ways to stay active  · Use ice or heat packs:  Use ice or heat packs as directed on the sore area of your body to decrease the pain and swelling  Put ice in a plastic bag covered with a towel on your low back  Cover heated items with a towel to avoid burns  Use ice and heat as directed  · Avoid heavy lifting: Your condition may worsen if you lift heavy things  Avoid lifting if possible  · Maintain a healthy weight:  Excess body weight may strain your back  Talk with your healthcare provider about ways to lose excess weight if you are overweight  Contact your healthcare provider or spine specialist if:   · Your pain does not improve within 1 to 3 weeks after treatment  · Your pain and weakness keep you from your normal activities at work, home, or school  · You lose more than 10 pounds in 6 months without trying  · You become depressed or sad because of the pain  · You have questions or concerns about your condition or care  Return to the emergency department if:   · You have a fever greater than 100 4°F for longer than 2 days  · You have new, severe back or leg pain, or your pain spreads to both legs  · You have any new signs of numbness or weakness, especially in your lower back, legs, arms, or genital area  · You have new trouble controlling your urine and bowel movements  · You do not feel like your bladder empties when you urinate      © Copyright [employer] 2021 Information is for End User's use only and may not be sold, redistributed or otherwise used for commercial purposes  All illustrations and images included in CareNotes® are the copyrighted property of A D A M [employer] Inc  or Betty Pierre  The above information is an  only  It is not intended as medical advice for individual conditions or treatments  Talk to your doctor, nurse or pharmacist before following any medical regimen to see if it is safe and effective for you
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
## 2021-09-16 ENCOUNTER — TELEPHONE (OUTPATIENT)
Dept: PAIN MEDICINE | Facility: CLINIC | Age: 41
End: 2021-09-16

## 2021-09-16 NOTE — TELEPHONE ENCOUNTER
Scheduled pt for B/L Piriformis Injection for 09/29/21  Went over pre-procedure instructions below:  Nothing to eat or drink 1 hr prior to procedure  Need to arrange transportation  Proper clothing for procedure  If ill or placed on antibiotics please call to reschedule  Covid/travel/ and vaccine instructions

## 2021-09-17 ENCOUNTER — PATIENT OUTREACH (OUTPATIENT)
Dept: FAMILY MEDICINE CLINIC | Facility: CLINIC | Age: 41
End: 2021-09-17

## 2021-09-17 NOTE — PROGRESS NOTES
Outreached patient to check status after  appt that was scheduled for 9/14  Per chart review, patient connected with [facility] related to back pain/piriformis syndrome of both sides  Plan is for patient to receive injections and patient has been restarted on Cymbalta  No answer, voicemail left, and awaiting return call

## 2021-09-27 NOTE — TELEPHONE ENCOUNTER
Left a detailed message as per release of health information, advising pt she does not need to hold any medication for b/l piriformis injection  C/B # provided for any questions

## 2021-09-27 NOTE — TELEPHONE ENCOUNTER
Pt called in asking if she can take her medications savla and wellbrutin  Prior to procedure   Thank you      [phone]

## 2021-09-28 DIAGNOSIS — M79.7 FIBROMYALGIA: Primary | ICD-10-CM

## 2021-09-29 ENCOUNTER — HOSPITAL ENCOUNTER (OUTPATIENT)
Dept: RADIOLOGY | Facility: CLINIC | Age: 41
Discharge: HOME | End: 2021-09-29
Attending: PHYSICAL MEDICINE & REHABILITATION | Admitting: PHYSICAL MEDICINE & REHABILITATION
Payer: COMMERCIAL

## 2021-09-29 VITALS
RESPIRATION RATE: 18 BRPM | DIASTOLIC BLOOD PRESSURE: 78 MMHG | TEMPERATURE: 98.1 F | HEART RATE: 84 BPM | SYSTOLIC BLOOD PRESSURE: 113 MMHG | OXYGEN SATURATION: 99 %

## 2021-09-29 DIAGNOSIS — M54.6 CHRONIC MIDLINE THORACIC BACK PAIN: ICD-10-CM

## 2021-09-29 DIAGNOSIS — G89.29 CHRONIC MIDLINE THORACIC BACK PAIN: ICD-10-CM

## 2021-09-29 DIAGNOSIS — G57.03 PIRIFORMIS SYNDROME OF BOTH SIDES: ICD-10-CM

## 2021-09-29 DIAGNOSIS — M54.50 LUMBAR BACK PAIN: ICD-10-CM

## 2021-09-29 PROCEDURE — 77002 NEEDLE LOCALIZATION BY XRAY: CPT | Performed by: PHYSICAL MEDICINE & REHABILITATION

## 2021-09-29 PROCEDURE — 20552 NJX 1/MLT TRIGGER POINT 1/2: CPT | Performed by: PHYSICAL MEDICINE & REHABILITATION

## 2021-09-29 RX ORDER — 0.9 % SODIUM CHLORIDE 0.9 %
10 VIAL (ML) INJECTION ONCE
Status: COMPLETED | OUTPATIENT
Start: 2021-09-29 | End: 2021-09-29

## 2021-09-29 RX ORDER — METHYLPREDNISOLONE ACETATE 80 MG/ML
80 INJECTION, SUSPENSION INTRA-ARTICULAR; INTRALESIONAL; INTRAMUSCULAR; PARENTERAL; SOFT TISSUE ONCE
Status: COMPLETED | OUTPATIENT
Start: 2021-09-29 | End: 2021-09-29

## 2021-09-29 RX ORDER — BUPIVACAINE HCL/PF 2.5 MG/ML
10 VIAL (ML) INJECTION ONCE
Status: COMPLETED | OUTPATIENT
Start: 2021-09-29 | End: 2021-09-29

## 2021-09-29 RX ADMIN — METHYLPREDNISOLONE ACETATE 80 MG: 80 INJECTION, SUSPENSION INTRA-ARTICULAR; INTRALESIONAL; INTRAMUSCULAR; PARENTERAL; SOFT TISSUE at 08:58

## 2021-09-29 RX ADMIN — IOHEXOL 2 ML: 300 INJECTION, SOLUTION INTRAVENOUS at 08:57

## 2021-09-29 RX ADMIN — BUPIVACAINE HYDROCHLORIDE 4 ML: 2.5 INJECTION, SOLUTION EPIDURAL; INFILTRATION; INTRACAUDAL at 08:58

## 2021-09-29 RX ADMIN — SODIUM CHLORIDE 4 ML: 9 INJECTION, SOLUTION INTRAMUSCULAR; INTRAVENOUS; SUBCUTANEOUS at 08:56

## 2021-09-29 RX ADMIN — Medication 4 ML: at 08:56

## 2021-10-01 ENCOUNTER — PATIENT OUTREACH (OUTPATIENT)
Dept: FAMILY MEDICINE CLINIC | Facility: CLINIC | Age: 41
End: 2021-10-01

## 2021-10-06 ENCOUNTER — TELEPHONE (OUTPATIENT)
Dept: PAIN MEDICINE | Facility: CLINIC | Age: 41
End: 2021-10-06

## 2021-10-12 ENCOUNTER — PATIENT OUTREACH (OUTPATIENT)
Dept: FAMILY MEDICINE CLINIC | Facility: CLINIC | Age: 41
End: 2021-10-12

## 2021-10-27 ENCOUNTER — OFFICE VISIT (OUTPATIENT)
Dept: FAMILY MEDICINE CLINIC | Facility: CLINIC | Age: 41
End: 2021-10-27
Payer: COMMERCIAL

## 2021-10-27 VITALS
HEART RATE: 90 BPM | SYSTOLIC BLOOD PRESSURE: 114 MMHG | DIASTOLIC BLOOD PRESSURE: 80 MMHG | TEMPERATURE: 96 F | HEIGHT: 61 IN | OXYGEN SATURATION: 99 % | BODY MASS INDEX: 27.08 KG/M2 | WEIGHT: 143.4 LBS

## 2021-10-27 DIAGNOSIS — Z23 IMMUNIZATION DUE: ICD-10-CM

## 2021-10-27 DIAGNOSIS — M79.7 FIBROMYALGIA: ICD-10-CM

## 2021-10-27 DIAGNOSIS — F33.2 SEVERE EPISODE OF RECURRENT MAJOR DEPRESSIVE DISORDER, WITHOUT PSYCHOTIC FEATURES (HCC): Primary | ICD-10-CM

## 2021-10-27 PROCEDURE — 1036F TOBACCO NON-USER: CPT | Performed by: FAMILY MEDICINE

## 2021-10-27 PROCEDURE — 3008F BODY MASS INDEX DOCD: CPT | Performed by: FAMILY MEDICINE

## 2021-10-27 PROCEDURE — 99214 OFFICE O/P EST MOD 30 MIN: CPT | Performed by: FAMILY MEDICINE

## 2021-11-16 ENCOUNTER — PATIENT MESSAGE (OUTPATIENT)
Dept: FAMILY MEDICINE CLINIC | Facility: CLINIC | Age: 41
End: 2021-11-16

## 2021-11-16 DIAGNOSIS — Z59.89 UNDERINSURED: Primary | ICD-10-CM

## 2021-11-16 SDOH — ECONOMIC STABILITY - INCOME SECURITY: OTHER PROBLEMS RELATED TO HOUSING AND ECONOMIC CIRCUMSTANCES: Z59.89

## 2021-11-22 DIAGNOSIS — F33.2 SEVERE EPISODE OF RECURRENT MAJOR DEPRESSIVE DISORDER, WITHOUT PSYCHOTIC FEATURES (HCC): ICD-10-CM

## 2021-11-22 RX ORDER — BUPROPION HYDROCHLORIDE 300 MG/1
300 TABLET ORAL EVERY MORNING
Qty: 90 TABLET | Refills: 0 | Status: SHIPPED | OUTPATIENT
Start: 2021-11-22 | End: 2022-02-09 | Stop reason: SDUPTHER

## 2021-11-23 ENCOUNTER — PATIENT OUTREACH (OUTPATIENT)
Dept: FAMILY MEDICINE CLINIC | Facility: CLINIC | Age: 41
End: 2021-11-23

## 2021-11-23 ENCOUNTER — TELEPHONE (OUTPATIENT)
Dept: FAMILY MEDICINE CLINIC | Facility: CLINIC | Age: 41
End: 2021-11-23

## 2021-11-23 DIAGNOSIS — B34.9 VIRAL INFECTION, UNSPECIFIED: Primary | ICD-10-CM

## 2021-11-23 DIAGNOSIS — R09.81 SINUS CONGESTION: ICD-10-CM

## 2021-11-23 PROCEDURE — U0003 INFECTIOUS AGENT DETECTION BY NUCLEIC ACID (DNA OR RNA); SEVERE ACUTE RESPIRATORY SYNDROME CORONAVIRUS 2 (SARS-COV-2) (CORONAVIRUS DISEASE [COVID-19]), AMPLIFIED PROBE TECHNIQUE, MAKING USE OF HIGH THROUGHPUT TECHNOLOGIES AS DESCRIBED BY CMS-2020-01-R: HCPCS | Performed by: FAMILY MEDICINE

## 2021-11-23 PROCEDURE — U0005 INFEC AGEN DETEC AMPLI PROBE: HCPCS | Performed by: FAMILY MEDICINE

## 2021-11-24 RX ORDER — AMOXICILLIN AND CLAVULANATE POTASSIUM 875; 125 MG/1; MG/1
1 TABLET, FILM COATED ORAL EVERY 12 HOURS SCHEDULED
Qty: 14 TABLET | Refills: 0 | Status: SHIPPED | OUTPATIENT
Start: 2021-11-24 | End: 2021-12-01

## 2021-11-30 ENCOUNTER — PATIENT OUTREACH (OUTPATIENT)
Dept: FAMILY MEDICINE CLINIC | Facility: CLINIC | Age: 41
End: 2021-11-30

## 2021-12-01 ENCOUNTER — PATIENT OUTREACH (OUTPATIENT)
Dept: FAMILY MEDICINE CLINIC | Facility: CLINIC | Age: 41
End: 2021-12-01

## 2021-12-06 DIAGNOSIS — M54.50 LUMBAR BACK PAIN: ICD-10-CM

## 2021-12-06 RX ORDER — CYCLOBENZAPRINE HCL 10 MG
10 TABLET ORAL 3 TIMES DAILY PRN
Qty: 90 TABLET | Refills: 0 | Status: SHIPPED | OUTPATIENT
Start: 2021-12-06 | End: 2022-01-25 | Stop reason: SDUPTHER

## 2021-12-09 ENCOUNTER — PATIENT MESSAGE (OUTPATIENT)
Dept: FAMILY MEDICINE CLINIC | Facility: CLINIC | Age: 41
End: 2021-12-09

## 2021-12-09 DIAGNOSIS — M79.7 FIBROMYALGIA: ICD-10-CM

## 2021-12-13 ENCOUNTER — TELEPHONE (OUTPATIENT)
Dept: FAMILY MEDICINE CLINIC | Facility: CLINIC | Age: 41
End: 2021-12-13

## 2021-12-29 ENCOUNTER — TELEPHONE (OUTPATIENT)
Dept: FAMILY MEDICINE CLINIC | Facility: CLINIC | Age: 41
End: 2021-12-29

## 2022-01-04 ENCOUNTER — TELEPHONE (OUTPATIENT)
Dept: FAMILY MEDICINE CLINIC | Facility: CLINIC | Age: 42
End: 2022-01-04

## 2022-01-04 DIAGNOSIS — R09.81 SINUS CONGESTION: Primary | ICD-10-CM

## 2022-01-04 RX ORDER — AMOXICILLIN AND CLAVULANATE POTASSIUM 875; 125 MG/1; MG/1
1 TABLET, FILM COATED ORAL EVERY 12 HOURS SCHEDULED
Qty: 14 TABLET | Refills: 0 | Status: SHIPPED | OUTPATIENT
Start: 2022-01-04 | End: 2022-01-11

## 2022-01-04 RX ORDER — PREDNISONE 20 MG/1
40 TABLET ORAL DAILY
Qty: 10 TABLET | Refills: 0 | Status: SHIPPED | OUTPATIENT
Start: 2022-01-04 | End: 2022-01-09

## 2022-01-04 RX ORDER — BROMPHENIRAMINE MALEATE, PSEUDOEPHEDRINE HYDROCHLORIDE, AND DEXTROMETHORPHAN HYDROBROMIDE 2; 30; 10 MG/5ML; MG/5ML; MG/5ML
5 SYRUP ORAL 4 TIMES DAILY PRN
Qty: 120 ML | Refills: 0 | Status: SHIPPED | OUTPATIENT
Start: 2022-01-04 | End: 2022-06-13 | Stop reason: ALTCHOICE

## 2022-01-04 NOTE — TELEPHONE ENCOUNTER
Pt called and stated her son tested positive on 12/27/21 for covid  She started on 12/27 with sinus congestion (green sputum), ear pain, vertigo, chest congestion, cough, headache, and fatigue  Also states her neuropathy in her legs is really bad  Pt also states she found out yesterday she is positive for covid  Pt is vaccinated  Asking if you could call in something for congestion, she uses the [facility] pharmacy on [address]  Informed her if she did not hear back from us to check with the pharmacy later today

## 2022-01-04 NOTE — TELEPHONE ENCOUNTER
meds called in  Please call and advise as below:     The current recommendation is to quarantine for 5 days from the first day of symptoms  As long as you do not have a fever (>100 4F) and your symptoms are improving, you can stop quarantine and resume activities after the 5 days  You should wear a mask around others for a total of 10 days from symptom onset  If you are feeling worse or have questions, feel free to let us know and we can arrange a virtual visit to further discuss

## 2022-01-04 NOTE — TELEPHONE ENCOUNTER
Patient stated that her symptoms started over a week ago 12/27/2021    And she does not feel any better at all   Each day something different starts      I made appoint with with Mary Lee

## 2022-01-05 ENCOUNTER — TELEMEDICINE (OUTPATIENT)
Dept: FAMILY MEDICINE CLINIC | Facility: CLINIC | Age: 42
End: 2022-01-05
Payer: COMMERCIAL

## 2022-01-05 DIAGNOSIS — U07.1 COVID-19: Primary | ICD-10-CM

## 2022-01-05 PROCEDURE — 99214 OFFICE O/P EST MOD 30 MIN: CPT | Performed by: NURSE PRACTITIONER

## 2022-01-05 RX ORDER — AZITHROMYCIN 250 MG/1
TABLET, FILM COATED ORAL
Qty: 6 TABLET | Refills: 0 | Status: SHIPPED | OUTPATIENT
Start: 2022-01-05 | End: 2022-01-09
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
## 2022-01-07 ENCOUNTER — TELEMEDICINE (OUTPATIENT)
Dept: FAMILY MEDICINE CLINIC | Facility: CLINIC | Age: 42
End: 2022-01-07
Payer: COMMERCIAL

## 2022-01-07 DIAGNOSIS — U07.1 COVID-19: Primary | ICD-10-CM

## 2022-01-07 PROCEDURE — 1036F TOBACCO NON-USER: CPT | Performed by: FAMILY MEDICINE

## 2022-01-07 PROCEDURE — 99213 OFFICE O/P EST LOW 20 MIN: CPT | Performed by: FAMILY MEDICINE
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
## 2022-01-25 DIAGNOSIS — M79.7 FIBROMYALGIA: ICD-10-CM

## 2022-01-25 DIAGNOSIS — M54.50 LUMBAR BACK PAIN: ICD-10-CM

## 2022-01-25 RX ORDER — CYCLOBENZAPRINE HCL 10 MG
10 TABLET ORAL 3 TIMES DAILY PRN
Qty: 90 TABLET | Refills: 0 | Status: SHIPPED | OUTPATIENT
Start: 2022-01-25 | End: 2022-02-28

## 2022-01-25 RX ORDER — MILNACIPRAN HYDROCHLORIDE 25 MG/1
TABLET, FILM COATED ORAL
COMMUNITY
Start: 2021-12-21 | End: 2022-05-31

## 2022-02-09 ENCOUNTER — OFFICE VISIT (OUTPATIENT)
Dept: FAMILY MEDICINE CLINIC | Facility: CLINIC | Age: 42
End: 2022-02-09
Payer: COMMERCIAL

## 2022-02-09 VITALS
HEIGHT: 61 IN | DIASTOLIC BLOOD PRESSURE: 80 MMHG | HEART RATE: 82 BPM | BODY MASS INDEX: 27.56 KG/M2 | WEIGHT: 146 LBS | SYSTOLIC BLOOD PRESSURE: 120 MMHG | TEMPERATURE: 98.4 F

## 2022-02-09 DIAGNOSIS — L57.0 AK (ACTINIC KERATOSIS): ICD-10-CM

## 2022-02-09 DIAGNOSIS — F33.2 SEVERE EPISODE OF RECURRENT MAJOR DEPRESSIVE DISORDER, WITHOUT PSYCHOTIC FEATURES (HCC): ICD-10-CM

## 2022-02-09 DIAGNOSIS — H60.8X3 CHRONIC ECZEMATOID OTITIS EXTERNA OF BOTH EARS: Primary | ICD-10-CM

## 2022-02-09 DIAGNOSIS — J01.00 ACUTE NON-RECURRENT MAXILLARY SINUSITIS: ICD-10-CM

## 2022-02-09 PROCEDURE — 1036F TOBACCO NON-USER: CPT | Performed by: FAMILY MEDICINE

## 2022-02-09 PROCEDURE — 3008F BODY MASS INDEX DOCD: CPT | Performed by: FAMILY MEDICINE

## 2022-02-09 PROCEDURE — 99214 OFFICE O/P EST MOD 30 MIN: CPT | Performed by: FAMILY MEDICINE

## 2022-02-09 RX ORDER — BUPROPION HYDROCHLORIDE 300 MG/1
300 TABLET ORAL EVERY MORNING
Qty: 90 TABLET | Refills: 0 | Status: SHIPPED | OUTPATIENT
Start: 2022-02-09 | End: 2022-05-29 | Stop reason: SDUPTHER

## 2022-02-09 RX ORDER — CIPROFLOXACIN AND DEXAMETHASONE 3; 1 MG/ML; MG/ML
4 SUSPENSION/ DROPS AURICULAR (OTIC) 2 TIMES DAILY
Qty: 7.5 ML | Refills: 0 | Status: SHIPPED | OUTPATIENT
Start: 2022-02-09 | End: 2022-04-04

## 2022-02-09 RX ORDER — METHYLPREDNISOLONE 4 MG/1
TABLET ORAL
Qty: 21 EACH | Refills: 0 | Status: SHIPPED | OUTPATIENT
Start: 2022-02-09 | End: 2022-04-14

## 2022-02-09 RX ORDER — AMOXICILLIN AND CLAVULANATE POTASSIUM 875; 125 MG/1; MG/1
1 TABLET, FILM COATED ORAL EVERY 12 HOURS SCHEDULED
Qty: 14 TABLET | Refills: 0 | Status: SHIPPED | OUTPATIENT
Start: 2022-02-09 | End: 2022-02-16

## 2022-02-09 NOTE — PATIENT INSTRUCTIONS
Promotion of Esther Shoulder  Dr Melanie Clifford recommends the following for the promotion of mental health:     Counseling/therapy may be of help to you  o [place]  o [facility] If you are taking medication, you must take it as prescribed  Do not stop taking it or change doses without speaking to your doctor   I encourage daily mindfulness habits including:  o Go outside  o Breathing exercises daily  o Meditations or guided relaxation  o Sleep hygiene (going to bed and waking up at the same time every day, even weekends)  o Fueling your body and mind with water and nutritious food throughout the day  o Let your friends and family know how you're feeling  Give them the opportunity to support you  Do not isolate yourself  Similarly, you may want to spend less time with people in your life who have bad habits you are trying to eliminate, or those who bring you down   o Be mindful of habits like smoking, drinking alcohol, and the use of other substances  I recommend a "clean" lifestyle to improve your mental health   o Use smart phone apps and YouTube to find meditations and breathing exercises:  - Free apps I like: Insight Timer, Woebot, Breethe, Breathe+, MyLife Meditation --> note some of these apps have free and paid sections, so you may not be able to access all features  - Paid apps I like: Headspace, Breathing Zone, [place], Calm  - I am not sponsored by nor do I receive money from these apps, and they are not officially recommended by [address]  I recommend them because I use them or my patients use them with success      If you ever feel like you may hurt yourself or someone else, please call 9-1-1, text [phone], or go to the nearest emergency department    I also recommend signing up for My Chart if you haven't already, which is the [facility] [address]  [employer]s kerry, so that you can send me messages to ask questions through the My Chart portal  [email or web address]    National Suicide Prevention Hotline: 1-419.515.4147  If you or someone you know is suicidal or in emotional distress, contact the [facility]  Trained crisis workers are available to talk 24 hours a day, 7 days a week  Your confidential and toll-free call goes to the nearest crisis center in the [facility]  These centers provide crisis counseling and mental health referrals  If you or someone you know is in immediate danger, please call 9-1-1  [facility] Treatment Referral Helpline: 8-310.489.8629  Get general information on mental health and locate treatment services in your area  Speak to a live person, Monday through Friday from 8 a m  to 8 p m  EST      Support Groups:  [facility] Support Groups  Call intake to register: [address] [place] [address] Ne on Mental Illness:  [facility], [address]  [phone]  [email or web address] [employer]/  They provide multiple services including support groups for those with mental illness, as well as the family members of individuals with mental illness
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
## 2022-02-10 ENCOUNTER — TELEPHONE (OUTPATIENT)
Dept: DERMATOLOGY | Facility: CLINIC | Age: 42
End: 2022-02-10

## 2022-02-10 PROCEDURE — 17000 DESTRUCT PREMALG LESION: CPT | Performed by: FAMILY MEDICINE

## 2022-02-21 DIAGNOSIS — M79.7 FIBROMYALGIA: ICD-10-CM

## 2022-02-22 RX ORDER — MILNACIPRAN HYDROCHLORIDE 50 MG/1
TABLET, FILM COATED ORAL
Qty: 60 TABLET | Refills: 0 | Status: SHIPPED | OUTPATIENT
Start: 2022-02-22 | End: 2022-04-18 | Stop reason: SDUPTHER

## 2022-02-27 DIAGNOSIS — M54.50 LUMBAR BACK PAIN: ICD-10-CM

## 2022-02-28 RX ORDER — CYCLOBENZAPRINE HCL 10 MG
TABLET ORAL
Qty: 90 TABLET | Refills: 0 | Status: SHIPPED | OUTPATIENT
Start: 2022-02-28 | End: 2022-04-14

## 2022-03-31 ENCOUNTER — TELEPHONE (OUTPATIENT)
Dept: DERMATOLOGY | Facility: CLINIC | Age: 42
End: 2022-03-31

## 2022-03-31 PROCEDURE — 87070 CULTURE OTHR SPECIMN AEROBIC: CPT | Performed by: PHYSICIAN ASSISTANT

## 2022-03-31 PROCEDURE — 87186 SC STD MICRODIL/AGAR DIL: CPT | Performed by: PHYSICIAN ASSISTANT

## 2022-03-31 PROCEDURE — 87205 SMEAR GRAM STAIN: CPT | Performed by: PHYSICIAN ASSISTANT

## 2022-03-31 PROCEDURE — 87102 FUNGUS ISOLATION CULTURE: CPT | Performed by: PHYSICIAN ASSISTANT

## 2022-03-31 NOTE — TELEPHONE ENCOUNTER
Pt left vm asking if she should cancel appt as she isnt feeling well, I cb but n/a so I left a vm to cb to reschedule as I see Jesse Maty had already canceled the appt   carlos

## 2022-04-01 ENCOUNTER — PROBLEM (OUTPATIENT)
Dept: URBAN - METROPOLITAN AREA CLINIC 6 | Facility: CLINIC | Age: 42
End: 2022-04-01

## 2022-04-01 DIAGNOSIS — H16.9: ICD-10-CM

## 2022-04-01 PROCEDURE — 92014 COMPRE OPH EXAM EST PT 1/>: CPT

## 2022-04-01 ASSESSMENT — VISUAL ACUITY
OS_CC: 20/40-2
OD_CC: 20/30+1
OU_CC: J1+

## 2022-04-06 NOTE — TELEPHONE ENCOUNTER
Pt had vertigo at last apt, cancelled and called pt, didn't want to reschedule at that time due to sickness and no ride  LM this morning to call as Dr Kayden Raya has apts in [place] tomorrow (closer location for her) or [facility] when resident schedule opens

## 2022-04-12 ENCOUNTER — APPOINTMENT (OUTPATIENT)
Dept: LAB | Facility: CLINIC | Age: 42
End: 2022-04-12
Payer: COMMERCIAL

## 2022-04-12 DIAGNOSIS — R59.0 LYMPHADENOPATHY OF RIGHT CERVICAL REGION: ICD-10-CM

## 2022-04-12 DIAGNOSIS — E55.9 VITAMIN D DEFICIENCY: ICD-10-CM

## 2022-04-12 DIAGNOSIS — B99.9 RECURRENT INFECTIONS: ICD-10-CM

## 2022-04-12 DIAGNOSIS — M54.2 CERVICALGIA: ICD-10-CM

## 2022-04-12 DIAGNOSIS — E53.8 B12 DEFICIENCY: ICD-10-CM

## 2022-04-12 DIAGNOSIS — G89.29 CHRONIC EAR PAIN, BILATERAL: ICD-10-CM

## 2022-04-12 DIAGNOSIS — Z00.00 ANNUAL PHYSICAL EXAM: ICD-10-CM

## 2022-04-12 DIAGNOSIS — M54.2 NECK PAIN: ICD-10-CM

## 2022-04-12 DIAGNOSIS — H92.03 CHRONIC EAR PAIN, BILATERAL: ICD-10-CM

## 2022-04-12 DIAGNOSIS — E04.9 ENLARGED THYROID: ICD-10-CM

## 2022-04-12 LAB
25(OH)D3 SERPL-MCNC: 24.9 NG/ML (ref 30–100)
ALBUMIN SERPL BCP-MCNC: 3.9 G/DL (ref 3.5–5)
ALP SERPL-CCNC: 105 U/L (ref 46–116)
ALT SERPL W P-5'-P-CCNC: 42 U/L (ref 12–78)
ANION GAP SERPL CALCULATED.3IONS-SCNC: 9 MMOL/L (ref 4–13)
AST SERPL W P-5'-P-CCNC: 25 U/L (ref 5–45)
BILIRUB SERPL-MCNC: 0.19 MG/DL (ref 0.2–1)
BILIRUB UR QL STRIP: NEGATIVE
BUN SERPL-MCNC: 11 MG/DL (ref 5–25)
CALCIUM SERPL-MCNC: 8.8 MG/DL (ref 8.3–10.1)
CHLORIDE SERPL-SCNC: 103 MMOL/L (ref 100–108)
CHOLEST SERPL-MCNC: 174 MG/DL
CLARITY UR: CLEAR
CO2 SERPL-SCNC: 27 MMOL/L (ref 21–32)
COLOR UR: NORMAL
CREAT SERPL-MCNC: 0.7 MG/DL (ref 0.6–1.3)
ERYTHROCYTE [DISTWIDTH] IN BLOOD BY AUTOMATED COUNT: 12.7 % (ref 11.6–15.1)
GFR SERPL CREATININE-BSD FRML MDRD: 109 ML/MIN/1.73SQ M
GLUCOSE P FAST SERPL-MCNC: 91 MG/DL (ref 65–99)
GLUCOSE UR STRIP-MCNC: NEGATIVE MG/DL
HCT VFR BLD AUTO: 42.7 % (ref 34.8–46.1)
HDLC SERPL-MCNC: 39 MG/DL
HGB BLD-MCNC: 14.3 G/DL (ref 11.5–15.4)
HGB UR QL STRIP.AUTO: NEGATIVE
IGA SERPL-MCNC: 154 MG/DL (ref 70–400)
IGG SERPL-MCNC: 773 MG/DL (ref 700–1600)
IGM SERPL-MCNC: 144 MG/DL (ref 40–230)
KETONES UR STRIP-MCNC: NEGATIVE MG/DL
LDLC SERPL CALC-MCNC: 109 MG/DL (ref 0–100)
LEUKOCYTE ESTERASE UR QL STRIP: NEGATIVE
MCH RBC QN AUTO: 29.9 PG (ref 26.8–34.3)
MCHC RBC AUTO-ENTMCNC: 33.5 G/DL (ref 31.4–37.4)
MCV RBC AUTO: 89 FL (ref 82–98)
NITRITE UR QL STRIP: NEGATIVE
PH UR STRIP.AUTO: 7.5 [PH]
PLATELET # BLD AUTO: 363 THOUSANDS/UL (ref 149–390)
PMV BLD AUTO: 9 FL (ref 8.9–12.7)
POTASSIUM SERPL-SCNC: 4.3 MMOL/L (ref 3.5–5.3)
PROT SERPL-MCNC: 7.6 G/DL (ref 6.4–8.2)
PROT UR STRIP-MCNC: NEGATIVE MG/DL
RBC # BLD AUTO: 4.79 MILLION/UL (ref 3.81–5.12)
SODIUM SERPL-SCNC: 139 MMOL/L (ref 136–145)
SP GR UR STRIP.AUTO: 1.01 (ref 1–1.03)
TRIGL SERPL-MCNC: 131 MG/DL
TSH SERPL DL<=0.05 MIU/L-ACNC: 1.55 UIU/ML (ref 0.45–4.5)
UROBILINOGEN UR QL STRIP.AUTO: 0.2 E.U./DL
VIT B12 SERPL-MCNC: 345 PG/ML (ref 100–900)
WBC # BLD AUTO: 6.93 THOUSAND/UL (ref 4.31–10.16)

## 2022-04-12 PROCEDURE — 80053 COMPREHEN METABOLIC PANEL: CPT

## 2022-04-12 PROCEDURE — 36415 COLL VENOUS BLD VENIPUNCTURE: CPT

## 2022-04-12 PROCEDURE — 85027 COMPLETE CBC AUTOMATED: CPT

## 2022-04-12 PROCEDURE — 82607 VITAMIN B-12: CPT

## 2022-04-12 PROCEDURE — 82784 ASSAY IGA/IGD/IGG/IGM EACH: CPT

## 2022-04-12 PROCEDURE — 82306 VITAMIN D 25 HYDROXY: CPT

## 2022-04-12 PROCEDURE — 84443 ASSAY THYROID STIM HORMONE: CPT

## 2022-04-12 PROCEDURE — 80061 LIPID PANEL: CPT

## 2022-04-12 PROCEDURE — 81003 URINALYSIS AUTO W/O SCOPE: CPT | Performed by: FAMILY MEDICINE

## 2022-04-14 DIAGNOSIS — M54.50 LUMBAR BACK PAIN: ICD-10-CM

## 2022-04-14 RX ORDER — CYCLOBENZAPRINE HCL 10 MG
TABLET ORAL
Qty: 90 TABLET | Refills: 0 | Status: SHIPPED | OUTPATIENT
Start: 2022-04-14 | End: 2022-05-31 | Stop reason: SDUPTHER

## 2022-04-18 DIAGNOSIS — M79.7 FIBROMYALGIA: ICD-10-CM

## 2022-04-18 RX ORDER — MILNACIPRAN HYDROCHLORIDE 50 MG/1
50 TABLET, FILM COATED ORAL 2 TIMES DAILY
Qty: 60 TABLET | Refills: 0 | Status: SHIPPED | OUTPATIENT
Start: 2022-04-18 | End: 2022-05-31 | Stop reason: SDUPTHER

## 2022-04-19 ENCOUNTER — FOLLOW UP (OUTPATIENT)
Dept: URBAN - METROPOLITAN AREA CLINIC 6 | Facility: CLINIC | Age: 42
End: 2022-04-19

## 2022-04-19 DIAGNOSIS — H16.9: ICD-10-CM

## 2022-04-19 PROCEDURE — 92012 INTRM OPH EXAM EST PATIENT: CPT

## 2022-04-19 ASSESSMENT — TONOMETRY
OD_IOP_MMHG: 21
OS_IOP_MMHG: 20

## 2022-04-19 ASSESSMENT — VISUAL ACUITY
OS_PH: 20/25-3
OD_CC: 20/40
OU_CC: J1
OS_CC: 20/60
OD_PH: 20/25-1

## 2022-05-03 ENCOUNTER — FOLLOW UP (OUTPATIENT)
Dept: URBAN - METROPOLITAN AREA CLINIC 6 | Facility: CLINIC | Age: 42
End: 2022-05-03

## 2022-05-03 DIAGNOSIS — H16.9: ICD-10-CM

## 2022-05-03 PROCEDURE — 92012 INTRM OPH EXAM EST PATIENT: CPT

## 2022-05-03 ASSESSMENT — VISUAL ACUITY
OD_CC: 20/30-2
OS_CC: 20/30-2

## 2022-05-29 ENCOUNTER — PATIENT MESSAGE (OUTPATIENT)
Dept: FAMILY MEDICINE CLINIC | Facility: CLINIC | Age: 42
End: 2022-05-29

## 2022-05-29 DIAGNOSIS — F33.2 SEVERE EPISODE OF RECURRENT MAJOR DEPRESSIVE DISORDER, WITHOUT PSYCHOTIC FEATURES (HCC): ICD-10-CM

## 2022-05-29 DIAGNOSIS — M79.7 FIBROMYALGIA: ICD-10-CM

## 2022-05-29 DIAGNOSIS — M54.50 LUMBAR BACK PAIN: ICD-10-CM

## 2022-05-31 RX ORDER — BUPROPION HYDROCHLORIDE 300 MG/1
300 TABLET ORAL EVERY MORNING
Qty: 90 TABLET | Refills: 0 | Status: SHIPPED | OUTPATIENT
Start: 2022-05-31

## 2022-05-31 RX ORDER — MILNACIPRAN HYDROCHLORIDE 50 MG/1
50 TABLET, FILM COATED ORAL 2 TIMES DAILY
Qty: 180 TABLET | Refills: 0 | Status: SHIPPED | OUTPATIENT
Start: 2022-05-31

## 2022-05-31 RX ORDER — CYCLOBENZAPRINE HCL 10 MG
10 TABLET ORAL 3 TIMES DAILY PRN
Qty: 270 TABLET | Refills: 0 | Status: SHIPPED | OUTPATIENT
Start: 2022-05-31

## 2022-05-31 RX ORDER — BUPROPION HYDROCHLORIDE 300 MG/1
300 TABLET ORAL EVERY MORNING
Qty: 90 TABLET | Refills: 0 | Status: SHIPPED | OUTPATIENT
Start: 2022-05-31 | End: 2022-05-31 | Stop reason: SDUPTHER

## 2022-06-01 ENCOUNTER — RX CHECK (OUTPATIENT)
Dept: URBAN - METROPOLITAN AREA CLINIC 6 | Facility: CLINIC | Age: 42
End: 2022-06-01

## 2022-06-01 DIAGNOSIS — H52.13: ICD-10-CM

## 2022-06-01 DIAGNOSIS — H52.223: ICD-10-CM

## 2022-06-01 PROCEDURE — 92015 DETERMINE REFRACTIVE STATE: CPT

## 2022-06-13 ENCOUNTER — OFFICE VISIT (OUTPATIENT)
Dept: FAMILY MEDICINE CLINIC | Facility: CLINIC | Age: 42
End: 2022-06-13
Payer: COMMERCIAL

## 2022-06-13 VITALS
SYSTOLIC BLOOD PRESSURE: 112 MMHG | OXYGEN SATURATION: 98 % | WEIGHT: 149 LBS | HEIGHT: 61 IN | TEMPERATURE: 97.7 F | DIASTOLIC BLOOD PRESSURE: 86 MMHG | HEART RATE: 67 BPM | BODY MASS INDEX: 28.13 KG/M2

## 2022-06-13 DIAGNOSIS — K13.79 NODULE OF CHEEK: ICD-10-CM

## 2022-06-13 DIAGNOSIS — Z12.31 BREAST CANCER SCREENING BY MAMMOGRAM: ICD-10-CM

## 2022-06-13 DIAGNOSIS — M79.7 FIBROMYALGIA: Primary | ICD-10-CM

## 2022-06-13 PROCEDURE — 3725F SCREEN DEPRESSION PERFORMED: CPT | Performed by: FAMILY MEDICINE

## 2022-06-13 PROCEDURE — 99214 OFFICE O/P EST MOD 30 MIN: CPT | Performed by: FAMILY MEDICINE

## 2022-06-13 RX ORDER — FLUOCINOLONE ACETONIDE 0.11 MG/ML
OIL AURICULAR (OTIC)
COMMUNITY
Start: 2022-04-13

## 2022-06-13 RX ORDER — NEOMYCIN SULFATE, POLYMYXIN B SULFATE AND DEXAMETHASONE 3.5; 10000; 1 MG/ML; [USP'U]/ML; MG/ML
SUSPENSION/ DROPS OPHTHALMIC
COMMUNITY
Start: 2022-04-01 | End: 2022-06-29 | Stop reason: ALTCHOICE

## 2022-06-13 RX ORDER — PREDNISONE 20 MG/1
40 TABLET ORAL DAILY
Qty: 10 TABLET | Refills: 0 | Status: SHIPPED | OUTPATIENT
Start: 2022-06-13 | End: 2022-06-18

## 2022-06-13 NOTE — PROGRESS NOTES
Ekta Lucio Duffyeijennifer 1980 female MRN: [id number]    Family Medicine Follow-up Visit    Assessment/Plan   Fibro  Recommend starting prednisone for post-infectious inflammation   Stretch, ice, and gentle movement    Mammogram now that she is 38    Johnnette Cockayne was seen today for follow-up, fatigue and leg pain  Diagnoses and all orders for this visit:    Fibromyalgia  -     predniSONE 20 mg tablet; Take 2 tablets (40 mg total) by mouth daily for 5 days    Breast cancer screening by mammogram  -     Mammo screening bilateral w 3d & cad; Future    Nodule of cheek  -     Ambulatory Referral to Plastic Surgery; Future      Depression Screening and Follow-up Plan: Their PHQ-9 score was 15  Patient assessed for underlying major depression  Brief counseling provided and recommend additional follow-up/re-evaluation next office visit  Nadine Figueroa MD  [address]  6/13/2022      Please be aware that this note contains text that was dictated and there may be errors pertaining to "sound-alike" words during the dictation process  SUBJECTIVE    CC: Follow-up (Weight concerns/), Fatigue, and Leg Pain    HPI:  Nikita Carmona is a 38 y o  female who presented for a follow-up of chronic concerns  She is currently struggling with her leg pain  She was doing really well, exercising 4x week and feeling stronger  She had a gastroenteritis and since then has been having a harder time  She went for a walk on Saturday and didn't go too far, but then felt exhausted from the walk and very sore the next few days  She feels like she's not changed anything with her lifestyle but she's gained some weight and this is negatively impacting her mental health  She also has felt quite tired  Even feels shaky with yoga  She also would like the nodule on her face removed because it bothers her vision       PHQ-2/9 Depression Screening    Little interest or pleasure in doing things: 2 - more than half the days  Feeling down, depressed, or hopeless: 2 - more than half the days  Trouble falling or staying asleep, or sleeping too much: 0 - not at all  Feeling tired or having little energy: 3 - nearly every day  Poor appetite or overeatin - not at all  Feeling bad about yourself - or that you are a failure or have let yourself or your family down: 1 - several days  Trouble concentrating on things, such as reading the newspaper or watching television: 3 - nearly every day  Moving or speaking so slowly that other people could have noticed  Or the opposite - being so fidgety or restless that you have been moving around a lot more than usual: 3 - nearly every day  Thoughts that you would be better off dead, or of hurting yourself in some way: 1 - several days  PHQ-9 Score: 15   PHQ-9 Interpretation: Moderately severe depression        Review of Systems   Constitutional: Positive for fatigue and unexpected weight change  Negative for activity change, appetite change and fever  HENT: Negative for congestion and trouble swallowing  Eyes: Negative for visual disturbance  Respiratory: Negative for chest tightness and shortness of breath  Cardiovascular: Negative for palpitations  Gastrointestinal: Negative for diarrhea and nausea  Genitourinary: Negative for difficulty urinating  Musculoskeletal: Positive for arthralgias and myalgias  Skin: Negative for rash  Neurological: Negative for weakness, light-headedness and headaches  Psychiatric/Behavioral: Positive for decreased concentration and dysphoric mood  Negative for self-injury, sleep disturbance and suicidal ideas  The patient is nervous/anxious  All other systems reviewed and are negative        Historical Information     The following portions of the patient's history were reviewed and updated as appropriate: allergies, current medications, past medical history, past social history and problem list     Medications:   Meds/Allergies     Current Outpatient Medications:     albuterol (PROVENTIL HFA,VENTOLIN HFA) 90 mcg/act inhaler, Inhale 2 puffs every 6 (six) hours as needed for wheezing or shortness of breath, Disp: 1 Inhaler, Rfl: 5    baclofen 10 mg tablet, Take 1 tablet (10 mg total) by mouth 3 (three) times a day as needed for muscle spasms, Disp: 90 tablet, Rfl: 1    buPROPion (WELLBUTRIN XL) 300 mg 24 hr tablet, Take 1 tablet (300 mg total) by mouth every morning, Disp: 90 tablet, Rfl: 0    clotrimazole 1 % external solution, Use 4 gtts to right and left ear bid x 7 days, Disp: 30 mL, Rfl: 0    cyclobenzaprine (FLEXERIL) 10 mg tablet, Take 1 tablet (10 mg total) by mouth 3 (three) times a day as needed for muscle spasms, Disp: 270 tablet, Rfl: 0    fluocinolone acetonide (DERMOTIC) 0 01 % otic oil, USE FIVE DROPS TO EACH EAR TWO TIMES A DAY, Disp: , Rfl:     hyoscyamine (ANASPAZ,LEVSIN) 0 125 MG tablet, Take 1 tablet (0 125 mg total) by mouth every 4 (four) hours as needed for cramping, Disp: 30 tablet, Rfl: 0    ibuprofen (MOTRIN) 200 mg tablet, Take by mouth every 6 (six) hours as needed for mild pain, Disp: , Rfl:     meclizine (ANTIVERT) 12 5 MG tablet, Take 1 tablet (12 5 mg total) by mouth 3 (three) times a day as needed for dizziness, Disp: 30 tablet, Rfl: 0    Milnacipran HCl (Savella) 50 MG TABS, Take 1 tablet by mouth 2 (two) times a day, Disp: 180 tablet, Rfl: 0    mometasone (ELOCON) 0 1 % lotion, Apply topically daily as needed (Ear itch), Disp: 60 mL, Rfl: 0    mupirocin (BACTROBAN) 2 % ointment, 2 (two) times a day Apply sparingly to affected area, Disp: , Rfl:     Mupirocin POWD, Compound Mupirocin 30 mg capsule to be added to sinus rinse twice daily, Disp: 5400 g, Rfl: 3    neomycin-polymyxin-dexamethasone (MAXITROL) ophthalmic suspension, INSTILL 1 DROP BOTH EYES 4 TIMES A DAY FOR 1 MONTH, Disp: , Rfl:     polymyxin b-trimethoprim (POLYTRIM) ophthalmic solution, Use 4 gtts bid x 7 days to both EARS, Disp: 10 mL, Rfl: 0    predniSONE 20 mg tablet, Take 2 tablets (40 mg total) by mouth daily for 5 days, Disp: 10 tablet, Rfl: 0    Restasis 0 05 % ophthalmic emulsion, , Disp: , Rfl:     triamcinolone (KENALOG) 0 1 % ointment, 2 (two) times a day Apply sparingly to affected area, Disp: , Rfl:   Allergies   Allergen Reactions    Gluten Meal - Food Allergy Abdominal Pain    Lactose - Food Allergy     Oxycodone-Acetaminophen      Other reaction(s): SUICIDAL THOUGHTS  Reaction Date: 27Dec2015;     Tramadol Itching       OBJECTIVE    Vitals:   Vitals:    06/13/22 1403   BP: 112/86   Pulse: 67   Temp: 97 7 °F (36 5 °C)   SpO2: 98%   Weight: 67 6 kg (149 lb)   Height: 5' 1" (1 549 m)     Wt Readings from Last 3 Encounters:   06/13/22 67 6 kg (149 lb)   06/09/22 65 8 kg (145 lb)   05/05/22 65 8 kg (145 lb)     Body mass index is 28 15 kg/m²  BP Readings from Last 3 Encounters:   06/13/22 112/86   02/09/22 120/80   10/27/21 114/80     Pulse Readings from Last 3 Encounters:   06/13/22 67   02/09/22 82   10/27/21 90     No LMP recorded  Patient has had a hysterectomy  Physical Exam:    Physical Exam  Vitals and nursing note reviewed  Constitutional:       General: She is not in acute distress  Appearance: Normal appearance  She is well-developed  She is not ill-appearing or diaphoretic  HENT:      Head: Normocephalic and atraumatic  Comments: Nodule as indicated <5mm     Right Ear: External ear normal       Left Ear: External ear normal       Nose: Nose normal    Eyes:      General: Lids are normal       Conjunctiva/sclera: Conjunctivae normal    Neck:      Vascular: No JVD  Trachea: No tracheal deviation  Pulmonary:      Effort: No accessory muscle usage or respiratory distress  Skin:     Findings: No rash  Neurological:      Mental Status: She is alert  Labs: I have personally reviewed all pertinent results  Imaging:  I have personally reviewed all pertinent results

## 2022-06-29 ENCOUNTER — OFFICE VISIT (OUTPATIENT)
Dept: FAMILY MEDICINE CLINIC | Facility: CLINIC | Age: 42
End: 2022-06-29
Payer: COMMERCIAL

## 2022-06-29 VITALS
HEIGHT: 61 IN | WEIGHT: 143.9 LBS | SYSTOLIC BLOOD PRESSURE: 118 MMHG | OXYGEN SATURATION: 100 % | TEMPERATURE: 98.3 F | BODY MASS INDEX: 27.17 KG/M2 | HEART RATE: 58 BPM | DIASTOLIC BLOOD PRESSURE: 52 MMHG

## 2022-06-29 DIAGNOSIS — B37.0 THRUSH: Primary | ICD-10-CM

## 2022-06-29 PROCEDURE — 99213 OFFICE O/P EST LOW 20 MIN: CPT | Performed by: FAMILY MEDICINE

## 2022-06-29 PROCEDURE — 3008F BODY MASS INDEX DOCD: CPT | Performed by: FAMILY MEDICINE

## 2022-06-29 PROCEDURE — 1036F TOBACCO NON-USER: CPT | Performed by: FAMILY MEDICINE

## 2022-06-29 NOTE — PROGRESS NOTES
Assessment/Plan:      Diagnoses and all orders for this visit:    Thrush  -     nystatin (MYCOSTATIN) 500,000 units/5 mL suspension; Apply 5 mL (500,000 Units total) to the mouth or throat 4 (four) times a day for 7 days    Oragel for the ulcer  Counseled the patient regarding supportive care  They are to call or return to the office if not improving  Subjective:     Patient ID: Ruth Gunter is a 38 y o  female  HPI   Patient has been on amoxicillin for tooth loss by dentist  In the last 2 days she's developed dry mouth, burning of tongue, and a bump on her tongue that is painful  Denies fevers, chills, sore throat, cough  Review of Systems   Constitutional: Negative for chills and fever  HENT: Positive for mouth sores  Negative for congestion, ear discharge, ear pain and sore throat  Respiratory: Negative for cough  All other systems reviewed and are negative  Objective:     Physical Exam  Vitals and nursing note reviewed  Constitutional:       General: She is not in acute distress  Appearance: Normal appearance  She is well-developed  She is not ill-appearing or diaphoretic  HENT:      Head: Normocephalic and atraumatic  Right Ear: External ear normal       Left Ear: External ear normal       Nose: Nose normal       Mouth/Throat:        Comments: Aphthous ulcer located underneath tongue, right distal aspect   Absent tooth as indicated without surrounding erythema/swelling of the gums   Eyes:      General: Lids are normal       Conjunctiva/sclera: Conjunctivae normal    Neck:      Vascular: No JVD  Trachea: No tracheal deviation  Pulmonary:      Effort: No accessory muscle usage or respiratory distress  Skin:     Findings: No rash  Neurological:      Mental Status: She is alert

## 2022-06-29 NOTE — PATIENT INSTRUCTIONS
Oral Candidiasis   AMBULATORY CARE:   Oral candidiasis , or thrush, is a fungal infection that affects the inside of your mouth  Seek care immediately if:   You have trouble swallowing and your jaw and neck are stiff  You are dizzy, thirsty, or have a dry mouth  You are urinating little or not at all  You cannot eat or drink because of the pain  Contact your healthcare provider if:   You have a fever  You have nausea, vomiting, or diarrhea  Your signs and symptoms get worse, even after treatment  You have questions or concerns about your condition or care  Common symptoms include the following:   White or whitish-yellow patches in the mouth that look like milk curds    Redness or bleeding under the patches    Sore and painful mouth, with cracking or tearing on the corners    Bright red tongue that may feel like it is burning    Trouble swallowing and tasting    Swelling under dentures    Treatment for oral candidiasis  includes antifungal medicine that helps kill the fungus that caused your oral candidiasis  This medicine may be a pill or a solution that you gargle  Remove dentures before you gargle  Prevent oral candidiasis:  Brush your teeth, gums, and tongue after you eat and before you go to sleep  Use a toothbrush with soft bristles  See your dentist for regular exams  Remove your dentures when you sleep, or at least 6 hours each day  Clean your dentures and soak them in denture   Let them air dry after soaking  Follow up with your doctor as directed:  Write down your questions so you remember to ask them during your visits  © [employer] 2022 Information is for End User's use only and may not be sold, redistributed or otherwise used for commercial purposes  All illustrations and images included in CareNotes® are the copyrighted property of [employer]  or [facility] Niesha Marsh   The above information is an  only   It is not intended as medical advice for individual conditions or treatments  Talk to your doctor, nurse or pharmacist before following any medical regimen to see if it is safe and effective for you

## 2022-07-01 ENCOUNTER — TELEPHONE (OUTPATIENT)
Dept: FAMILY MEDICINE CLINIC | Facility: CLINIC | Age: 42
End: 2022-07-01

## 2022-07-01 DIAGNOSIS — B37.31 VAGINA, CANDIDIASIS: Primary | ICD-10-CM

## 2022-07-01 NOTE — TELEPHONE ENCOUNTER
Pt seen on 6/29 and diagnosed with thrush  Today pt states she started with vaginal burning and itching and also a white discharge  Pt is asking for something to be called in to her pharmacy  She uses the [facility] [address]  Informed pt if she did not hear back from us, to please check with the pharmacy later

## 2022-07-05 RX ORDER — FLUCONAZOLE 150 MG/1
150 TABLET ORAL ONCE
Qty: 1 TABLET | Refills: 0 | Status: SHIPPED | OUTPATIENT
Start: 2022-07-05 | End: 2022-07-05

## 2022-08-16 ENCOUNTER — TELEPHONE (OUTPATIENT)
Dept: PLASTIC SURGERY | Facility: CLINIC | Age: 42
End: 2022-08-16

## 2022-09-01 DIAGNOSIS — F33.2 SEVERE EPISODE OF RECURRENT MAJOR DEPRESSIVE DISORDER, WITHOUT PSYCHOTIC FEATURES (HCC): ICD-10-CM

## 2022-09-01 DIAGNOSIS — M79.7 FIBROMYALGIA: ICD-10-CM

## 2022-09-01 RX ORDER — BUPROPION HYDROCHLORIDE 300 MG/1
300 TABLET ORAL EVERY MORNING
Qty: 90 TABLET | Refills: 0 | Status: SHIPPED | OUTPATIENT
Start: 2022-09-01

## 2022-09-01 RX ORDER — MILNACIPRAN HYDROCHLORIDE 50 MG/1
50 TABLET, FILM COATED ORAL 2 TIMES DAILY
Qty: 180 TABLET | Refills: 0 | Status: SHIPPED | OUTPATIENT
Start: 2022-09-01

## 2022-09-13 ENCOUNTER — TELEPHONE (OUTPATIENT)
Dept: PLASTIC SURGERY | Facility: CLINIC | Age: 42
End: 2022-09-13

## 2022-09-13 NOTE — TELEPHONE ENCOUNTER
Left message to see if she would like to r/s her appt from 8/2/22 that she cancelled  Gave her [place] number to call back

## 2022-09-20 ENCOUNTER — OFFICE VISIT (OUTPATIENT)
Dept: PAIN MEDICINE | Facility: CLINIC | Age: 42
End: 2022-09-20
Payer: COMMERCIAL

## 2022-09-20 VITALS
HEART RATE: 89 BPM | SYSTOLIC BLOOD PRESSURE: 112 MMHG | BODY MASS INDEX: 27.02 KG/M2 | WEIGHT: 143 LBS | DIASTOLIC BLOOD PRESSURE: 87 MMHG

## 2022-09-20 DIAGNOSIS — M51.16 LUMBAR DISC DISEASE WITH RADICULOPATHY: Primary | ICD-10-CM

## 2022-09-20 DIAGNOSIS — M54.50 LUMBAR BACK PAIN: ICD-10-CM

## 2022-09-20 PROCEDURE — 99214 OFFICE O/P EST MOD 30 MIN: CPT | Performed by: PHYSICAL MEDICINE & REHABILITATION

## 2022-09-20 RX ORDER — GABAPENTIN 300 MG/1
300 CAPSULE ORAL 2 TIMES DAILY
Qty: 60 CAPSULE | Refills: 1 | Status: SHIPPED | OUTPATIENT
Start: 2022-09-20
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
## 2022-09-28 ENCOUNTER — TELEPHONE (OUTPATIENT)
Dept: PAIN MEDICINE | Facility: CLINIC | Age: 42
End: 2022-09-28

## 2022-11-07 DIAGNOSIS — M54.50 LUMBAR BACK PAIN: ICD-10-CM

## 2022-11-08 RX ORDER — CYCLOBENZAPRINE HCL 10 MG
10 TABLET ORAL 3 TIMES DAILY PRN
Qty: 270 TABLET | Refills: 0 | Status: SHIPPED | OUTPATIENT
Start: 2022-11-08

## 2022-11-10 ENCOUNTER — OFFICE VISIT (OUTPATIENT)
Dept: FAMILY MEDICINE CLINIC | Facility: CLINIC | Age: 42
End: 2022-11-10

## 2022-11-10 ENCOUNTER — TELEPHONE (OUTPATIENT)
Dept: PAIN MEDICINE | Facility: CLINIC | Age: 42
End: 2022-11-10

## 2022-11-10 VITALS
SYSTOLIC BLOOD PRESSURE: 120 MMHG | TEMPERATURE: 97.8 F | OXYGEN SATURATION: 98 % | BODY MASS INDEX: 27.66 KG/M2 | DIASTOLIC BLOOD PRESSURE: 84 MMHG | HEART RATE: 111 BPM | WEIGHT: 146.5 LBS | HEIGHT: 61 IN

## 2022-11-10 DIAGNOSIS — Z00.00 ANNUAL PHYSICAL EXAM: Primary | ICD-10-CM

## 2022-11-10 DIAGNOSIS — E04.9 ENLARGED THYROID: ICD-10-CM

## 2022-11-10 DIAGNOSIS — E53.8 B12 DEFICIENCY: ICD-10-CM

## 2022-11-10 DIAGNOSIS — M54.50 LUMBAR BACK PAIN: ICD-10-CM

## 2022-11-10 DIAGNOSIS — E55.9 VITAMIN D DEFICIENCY: ICD-10-CM

## 2022-11-10 RX ORDER — BACLOFEN 10 MG/1
10 TABLET ORAL 3 TIMES DAILY PRN
Qty: 90 TABLET | Refills: 1 | Status: SHIPPED | OUTPATIENT
Start: 2022-11-10

## 2022-11-10 NOTE — TELEPHONE ENCOUNTER
Caller: patient    Doctor: Binta Malone    Reason for call: request to schedule procedure    Call back#: [phone] (if calling tomorrow, please call after 11 am, thx)

## 2022-11-10 NOTE — PATIENT INSTRUCTIONS
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
## 2022-11-15 NOTE — TELEPHONE ENCOUNTER
Caller: Patient    Doctor/Office: Rachelle Lopez asked to speak to: Procedure scheduling     Call was transferred to: Procedure scheduling

## 2022-11-16 ENCOUNTER — TELEPHONE (OUTPATIENT)
Dept: PAIN MEDICINE | Facility: CLINIC | Age: 42
End: 2022-11-16

## 2022-11-16 NOTE — TELEPHONE ENCOUNTER
----- Message from Shelbie Castaneda sent at 11/15/2022  3:49 PM EST -----  Are you ok to call her Jaki Jordan? Fidelina Bender  ----- Message -----  From: Cari Jeong DO  Sent: 11/15/2022   3:43 PM EST  To: Shelbie Persaud    Sure  ----- Message -----  From: Shelbie Castaneda  Sent: 11/15/2022   2:27 PM EST  To: Maylin Jarrett,    Would you be ok with Aden Tamayo going to [facility] for her procedure? Fidelina Bender  ----- Message -----  From: Deonte Harrell  Sent: 11/15/2022   2:04 PM EST  To: Blayne Chavis,    If this is for Dr Ganesh Jarrett, the next available if 11/30/22   ----- Message -----  From: Shelbie Castaneda  Sent: 11/15/2022   1:38 PM EST  To: Kimberley Andrealina Gobus would like to have her procedure, LESI (L5-S1), sooner than I can offer her, which is 1/9/23  Would you be able to get her in sooner?     Fidelina Bender

## 2022-11-16 NOTE — TELEPHONE ENCOUNTER
Patient to be contacted 11/16 by procedure  at [facility] for sooner procedure appointment

## 2022-11-16 NOTE — TELEPHONE ENCOUNTER
Scheduled patient for 12/7/22  Patient denies RX blood thinners/ NSAIDS  Nothing to eat or drink 1 hour prior to procedure  Needs to arrange transportation  Proper clothing for procedure  No vaccines 2 weeks prior or after procedure  If ill or place on antibiotics, please call to reschedule    COVID needs to bring card

## 2022-11-27 DIAGNOSIS — F33.2 SEVERE EPISODE OF RECURRENT MAJOR DEPRESSIVE DISORDER, WITHOUT PSYCHOTIC FEATURES (HCC): ICD-10-CM

## 2022-11-28 RX ORDER — BUPROPION HYDROCHLORIDE 300 MG/1
300 TABLET ORAL EVERY MORNING
Qty: 90 TABLET | Refills: 0 | Status: SHIPPED | OUTPATIENT
Start: 2022-11-28

## 2022-12-07 ENCOUNTER — HOSPITAL ENCOUNTER (OUTPATIENT)
Facility: AMBULARY SURGERY CENTER | Age: 42
Setting detail: OUTPATIENT SURGERY
Discharge: HOME | End: 2022-12-07
Attending: PHYSICAL MEDICINE & REHABILITATION | Admitting: PHYSICAL MEDICINE & REHABILITATION

## 2022-12-07 ENCOUNTER — APPOINTMENT (OUTPATIENT)
Dept: RADIOLOGY | Facility: HOSPITAL | Age: 42
End: 2022-12-07

## 2022-12-07 VITALS
RESPIRATION RATE: 16 BRPM | TEMPERATURE: 97.4 F | SYSTOLIC BLOOD PRESSURE: 111 MMHG | HEART RATE: 91 BPM | DIASTOLIC BLOOD PRESSURE: 73 MMHG | OXYGEN SATURATION: 98 %

## 2022-12-07 DIAGNOSIS — M54.50 LUMBAR BACK PAIN: ICD-10-CM

## 2022-12-07 RX ORDER — BACLOFEN 10 MG/1
TABLET ORAL
Qty: 270 TABLET | Refills: 1 | Status: SHIPPED | OUTPATIENT
Start: 2022-12-07

## 2022-12-07 RX ORDER — METHYLPREDNISOLONE ACETATE 80 MG/ML
INJECTION, SUSPENSION INTRA-ARTICULAR; INTRALESIONAL; INTRAMUSCULAR; SOFT TISSUE AS NEEDED
Status: DISCONTINUED | OUTPATIENT
Start: 2022-12-07 | End: 2022-12-07 | Stop reason: HOSPADM

## 2022-12-07 RX ORDER — LIDOCAINE HYDROCHLORIDE 10 MG/ML
INJECTION, SOLUTION EPIDURAL; INFILTRATION; INTRACAUDAL; PERINEURAL AS NEEDED
Status: DISCONTINUED | OUTPATIENT
Start: 2022-12-07 | End: 2022-12-07 | Stop reason: HOSPADM
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
## 2022-12-07 NOTE — DISCHARGE INSTRUCTIONS
Epidural Steroid Injection   WHAT YOU NEED TO KNOW:   An epidural steroid injection (RONEL) is a procedure to inject steroid medicine into the epidural space  The epidural space is between your spinal cord and vertebrae  Steroids reduce inflammation and fluid buildup in your spine that may be causing pain  You may be given pain medicine along with the steroids  ACTIVITY  Do not drive or operate machinery today  No strenuous activity today - bending, lifting, etc   You may resume normal activites starting tomorrow - start slowly and as tolerated  You may shower today, but no tub baths or hot tubs  You may have numbness for several hours from the local anesthetic  Please use caution and common sense, especially with weight-bearing activities  CARE OF THE INJECTION SITE  If you have soreness or pain, apply ice to the area today (20 minutes on/20 minutes off)  Starting tomorrow, you may use warm, moist heat or ice if needed  You may have an increase or change in your discomfort for 36-48 hours after your treatment  Apply ice and continue with any pain medication you have been prescribed  Notify the [facility] if you have any of the following: redness, drainage, swelling, headache, stiff neck or fever above 100°F     SPECIAL INSTRUCTIONS  Our office will contact you in approximately 7 days for a progress report  MEDICATIONS  Continue to take all routine medications  Our office may have instructed you to hold some medications  As no general anesthesia was used in today's procedure, you should not experience any side effects related to anesthesia  If you are diabetic, the steroids used in today's injection may temporarily increase your blood sugar levels after the first few days after your injection  Please keep a close eye on your sugars and alert the doctor who manages your diabetes if your sugars are significantly high from your baseline or you are symptomatic       If you have a problem specifically related to your procedure, please call our office at [phone]  Problems not related to your procedure should be directed to your primary care physician

## 2022-12-07 NOTE — OP NOTE
OPERATIVE REPORT  PATIENT NAME: Cristina Suresh    :  1980  MRN: [id number]  Pt Location: [facility] MINOR/PAIN ROOM 01    SURGERY DATE: 2022    Surgeon(s) and Role:     * Yue Nance, DO - Primary    Preop Diagnosis:  Lumbar back pain [M54 50]  Lumbar disc disease with radiculopathy [M51 16]    Post-Op Diagnosis Codes:     * Lumbar back pain [M54 50]     * Lumbar disc disease with radiculopathy [M51 16]    Procedure(s) (LRB):  L5 S1 LUMBAR epidural steroid injection (69267) (N/A)    Procedure: Fluoroscopically-guided L5 just S1 interlaminar epidural steroid injection under fluoroscopy    EBL:  none  Specimens:  not applicable    After discussing the risks, benefits, and alternatives to the procedure, the patient expressed understanding and wished to proceed  The patient was brought to the fluoroscopy suite and placed in the prone position  A procedural pause was conducted to verify:  correct patient identity, procedure to be performed and as applicable, correct side and site, correct patient position, and availability of implants, special equipment and special requirements  After identifying the L5-S1 space fluoroscopically, the skin was sterilely prepped and draped in the usual fashion using Chloraprep skin prep  The skin and subcutaneous tissues were anesthetized with 1% lidocaine  Utilizing a loss of resistance technique and intermittent fluoroscopic guidance, a 3 5 inch 20-gauge Tuohy needle was advanced into the epidural space  Proper needle positioning was confirmed using multiple fluoroscopic views  After negative aspiration, Omnipaque 240 contrast was injected confirming epidural spread without evidence of intravascular or intrathecal spread  A 4 ml solution consisting of 80 mg of Depo-Medrol in sterile saline was injected slowly and incrementally into the epidural space    Following the injection the needle was withdrawn slightly and flushed with 1% buffered lidocaine as it was fully extracted  The patient tolerated the procedure well and there were no apparent complications  After appropriate observation, the patient was dismissed from the clinic in good condition under their own power                               SIGNATURE: Tomasa Parks DO  DATE: December 7, 2022  TIME: 8:54 AM

## 2022-12-14 ENCOUNTER — TELEPHONE (OUTPATIENT)
Dept: PAIN MEDICINE | Facility: CLINIC | Age: 42
End: 2022-12-14

## 2022-12-27 ENCOUNTER — HOSPITAL ENCOUNTER (OUTPATIENT)
Dept: MAMMOGRAPHY | Facility: HOSPITAL | Age: 42
Discharge: HOME | End: 2022-12-27

## 2022-12-27 VITALS — WEIGHT: 146.61 LBS | HEIGHT: 61 IN | BODY MASS INDEX: 27.68 KG/M2

## 2022-12-27 DIAGNOSIS — Z12.31 BREAST CANCER SCREENING BY MAMMOGRAM: ICD-10-CM

## 2022-12-28 NOTE — TELEPHONE ENCOUNTER
Caller: Chaya Barrera   Doctor/office: Dr Calzada   CB#: [phone]    % of improvement: 60%  Pain Scale (1-10): 7-8/10     Patient also states numbness and tingling sensation both legs.Patient also states right arm pain.

## 2023-01-23 ENCOUNTER — OFFICE VISIT (OUTPATIENT)
Dept: PAIN MEDICINE | Facility: CLINIC | Age: 43
End: 2023-01-23

## 2023-01-23 VITALS
HEART RATE: 88 BPM | TEMPERATURE: 98.2 F | BODY MASS INDEX: 27.38 KG/M2 | DIASTOLIC BLOOD PRESSURE: 70 MMHG | SYSTOLIC BLOOD PRESSURE: 121 MMHG | WEIGHT: 145 LBS | HEIGHT: 61 IN

## 2023-01-23 DIAGNOSIS — M54.12 CERVICAL RADICULOPATHY: ICD-10-CM

## 2023-01-23 DIAGNOSIS — G89.4 CHRONIC PAIN SYNDROME: Primary | ICD-10-CM

## 2023-01-23 DIAGNOSIS — G89.29 CHRONIC BILATERAL LOW BACK PAIN WITH RIGHT-SIDED SCIATICA: ICD-10-CM

## 2023-01-23 DIAGNOSIS — M54.41 CHRONIC BILATERAL LOW BACK PAIN WITH RIGHT-SIDED SCIATICA: ICD-10-CM

## 2023-01-23 DIAGNOSIS — M54.2 NECK PAIN: ICD-10-CM

## 2023-01-23 DIAGNOSIS — G57.93 NEUROPATHY INVOLVING BOTH LOWER EXTREMITIES: Chronic | ICD-10-CM

## 2023-01-23 RX ORDER — PREGABALIN 50 MG/1
CAPSULE ORAL
Qty: 75 CAPSULE | Refills: 0 | Status: SHIPPED | OUTPATIENT
Start: 2023-01-23 | End: 2023-02-22
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
## 2023-01-30 ENCOUNTER — TELEPHONE (OUTPATIENT)
Dept: PAIN MEDICINE | Facility: CLINIC | Age: 43
End: 2023-01-30

## 2023-02-26 DIAGNOSIS — F33.2 SEVERE EPISODE OF RECURRENT MAJOR DEPRESSIVE DISORDER, WITHOUT PSYCHOTIC FEATURES (HCC): ICD-10-CM

## 2023-02-27 RX ORDER — BUPROPION HYDROCHLORIDE 300 MG/1
300 TABLET ORAL EVERY MORNING
Qty: 90 TABLET | Refills: 0 | Status: SHIPPED | OUTPATIENT
Start: 2023-02-27

## 2023-03-03 DIAGNOSIS — M54.16 LUMBAR RADICULOPATHY: ICD-10-CM

## 2023-03-03 DIAGNOSIS — M79.7 FIBROMYALGIA: Primary | ICD-10-CM

## 2023-03-03 DIAGNOSIS — G57.93 NEUROPATHY INVOLVING BOTH LOWER EXTREMITIES: Chronic | ICD-10-CM

## 2023-03-15 ENCOUNTER — OFFICE VISIT (OUTPATIENT)
Dept: PAIN MEDICINE | Facility: CLINIC | Age: 43
End: 2023-03-15

## 2023-03-15 VITALS
BODY MASS INDEX: 28.13 KG/M2 | HEART RATE: 96 BPM | RESPIRATION RATE: 16 BRPM | SYSTOLIC BLOOD PRESSURE: 119 MMHG | WEIGHT: 149 LBS | DIASTOLIC BLOOD PRESSURE: 92 MMHG | HEIGHT: 61 IN

## 2023-03-15 DIAGNOSIS — G57.03 PIRIFORMIS SYNDROME OF BOTH SIDES: ICD-10-CM

## 2023-03-15 DIAGNOSIS — M54.16 LUMBAR RADICULOPATHY: ICD-10-CM

## 2023-03-15 DIAGNOSIS — M46.1 SACROILIITIS (HCC): ICD-10-CM

## 2023-03-15 DIAGNOSIS — G89.4 CHRONIC PAIN SYNDROME: Primary | ICD-10-CM

## 2023-03-15 RX ORDER — GABAPENTIN 100 MG/1
100 CAPSULE ORAL 3 TIMES DAILY
Qty: 30 CAPSULE | Refills: 2 | Status: SHIPPED | OUTPATIENT
Start: 2023-03-15 | End: 2023-03-16
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
## 2023-03-16 RX ORDER — GABAPENTIN 100 MG/1
100 CAPSULE ORAL
Qty: 30 CAPSULE | Refills: 2 | Status: SHIPPED | OUTPATIENT
Start: 2023-03-16

## 2023-04-24 ENCOUNTER — HOSPITAL ENCOUNTER (OUTPATIENT)
Dept: RADIOLOGY | Facility: CLINIC | Age: 43
Discharge: HOME | End: 2023-04-24

## 2023-04-24 VITALS
SYSTOLIC BLOOD PRESSURE: 114 MMHG | TEMPERATURE: 99.5 F | DIASTOLIC BLOOD PRESSURE: 81 MMHG | HEART RATE: 84 BPM | RESPIRATION RATE: 18 BRPM | OXYGEN SATURATION: 98 %

## 2023-04-24 DIAGNOSIS — M46.1 SACROILIITIS (HCC): ICD-10-CM

## 2023-04-24 DIAGNOSIS — G57.03 PIRIFORMIS SYNDROME OF BOTH SIDES: ICD-10-CM

## 2023-04-24 RX ORDER — BUPIVACAINE HCL/PF 2.5 MG/ML
3 VIAL (ML) INJECTION ONCE
Status: COMPLETED | OUTPATIENT
Start: 2023-04-24 | End: 2023-04-24

## 2023-04-24 RX ORDER — METHYLPREDNISOLONE ACETATE 80 MG/ML
80 INJECTION, SUSPENSION INTRA-ARTICULAR; INTRALESIONAL; INTRAMUSCULAR; PARENTERAL; SOFT TISSUE ONCE
Status: COMPLETED | OUTPATIENT
Start: 2023-04-24 | End: 2023-04-24

## 2023-04-24 RX ORDER — 0.9 % SODIUM CHLORIDE 0.9 %
10 VIAL (ML) INJECTION ONCE
Status: COMPLETED | OUTPATIENT
Start: 2023-04-24 | End: 2023-04-24

## 2023-04-24 RX ADMIN — IOHEXOL 1 ML: 300 INJECTION, SOLUTION INTRAVENOUS at 14:55

## 2023-04-24 RX ADMIN — BUPIVACAINE HYDROCHLORIDE 3 ML: 2.5 INJECTION, SOLUTION EPIDURAL; INFILTRATION; INTRACAUDAL at 14:55

## 2023-04-24 RX ADMIN — SODIUM CHLORIDE 2 ML: 9 INJECTION, SOLUTION INTRAMUSCULAR; INTRAVENOUS; SUBCUTANEOUS at 14:53

## 2023-04-24 RX ADMIN — Medication 2 ML: at 14:53

## 2023-04-24 RX ADMIN — METHYLPREDNISOLONE ACETATE 80 MG: 80 INJECTION, SUSPENSION INTRA-ARTICULAR; INTRALESIONAL; INTRAMUSCULAR; PARENTERAL; SOFT TISSUE at 14:55
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
## 2023-04-24 NOTE — DISCHARGE INSTR - LAB
Steroid Joint Injection   WHAT YOU NEED TO KNOW:   A steroid joint injection is a procedure to inject steroid medicine into a joint  Steroid medicine decreases pain and inflammation  The injection may also contain an anesthetic (numbing medicine) to decrease pain  It may be done to treat conditions such as arthritis, gout, or carpal tunnel syndrome  The injections may be given in your knee, ankle, shoulder, elbow, wrist, ankle or sacroiliac joint  Do not apply heat to any area that is numb  If you have discomfort or soreness at the injection site, you may apply ice today, 20 minutes on and 20 minutes off  Tomorrow you may use ice or warm, moist heat  Do not apply ice or heat directly to the skin  You may have an increase or change in the discomfort for 36-48 hours after your treatment  Apply ice and continue with any pain medicine you have been prescribed  Do not do anything strenuous today  You may shower, but no tub baths or hot tubs today  You may resume your normal activities tomorrow, but do not “overdo it”  Resume normal activities slowly when you are feeling better  If you experience redness, drainage or swelling at the injection site, or if you develop a fever above 100 degrees, please call [facility] at [phone] or go to the Emergency Room  Continue to take all routine medicines prescribed by your primary care physician unless otherwise instructed by our staff  Most blood thinners should be started again according to your regularly scheduled dosing  If you have any questions, please give our office a call  As no general anesthesia was used in today's procedure, you should not experience any side effects related to anesthesia  If you are diabetic, the steroids used in today's injection may temporarily increase your blood sugar levels after the first few days after your injection   Please keep a close eye on your sugars and alert the doctor who manages your diabetes if your sugars are significantly high from your baseline or you are symptomatic  If you have a problem specifically related to your procedure, please call our office at [phone]  Problems not related to your procedure should be directed to your primary care physician

## 2023-05-01 ENCOUNTER — TELEPHONE (OUTPATIENT)
Dept: OBGYN CLINIC | Facility: HOSPITAL | Age: 43
End: 2023-05-01

## 2023-05-07 DIAGNOSIS — M79.7 FIBROMYALGIA: ICD-10-CM

## 2023-05-08 ENCOUNTER — HOSPITAL ENCOUNTER (OUTPATIENT)
Dept: RADIOLOGY | Facility: CLINIC | Age: 43
Discharge: HOME | End: 2023-05-08
Admitting: PHYSICAL MEDICINE & REHABILITATION

## 2023-05-08 VITALS
TEMPERATURE: 99.1 F | OXYGEN SATURATION: 98 % | RESPIRATION RATE: 18 BRPM | SYSTOLIC BLOOD PRESSURE: 108 MMHG | HEART RATE: 76 BPM | DIASTOLIC BLOOD PRESSURE: 73 MMHG

## 2023-05-08 DIAGNOSIS — M46.1 SACROILIITIS (HCC): ICD-10-CM

## 2023-05-08 DIAGNOSIS — G57.03 PIRIFORMIS SYNDROME OF BOTH SIDES: ICD-10-CM

## 2023-05-08 RX ORDER — BUPIVACAINE HCL/PF 2.5 MG/ML
3 VIAL (ML) INJECTION ONCE
Status: COMPLETED | OUTPATIENT
Start: 2023-05-08 | End: 2023-05-08

## 2023-05-08 RX ORDER — 0.9 % SODIUM CHLORIDE 0.9 %
10 VIAL (ML) INJECTION ONCE
Status: COMPLETED | OUTPATIENT
Start: 2023-05-08 | End: 2023-05-08

## 2023-05-08 RX ORDER — MILNACIPRAN HYDROCHLORIDE 50 MG/1
50 TABLET, FILM COATED ORAL 2 TIMES DAILY
Qty: 60 TABLET | Refills: 0 | Status: SHIPPED | OUTPATIENT
Start: 2023-05-08 | End: 2023-05-10 | Stop reason: SDUPTHER

## 2023-05-08 RX ORDER — METHYLPREDNISOLONE ACETATE 80 MG/ML
80 INJECTION, SUSPENSION INTRA-ARTICULAR; INTRALESIONAL; INTRAMUSCULAR; PARENTERAL; SOFT TISSUE ONCE
Status: COMPLETED | OUTPATIENT
Start: 2023-05-08 | End: 2023-05-08

## 2023-05-08 RX ADMIN — BUPIVACAINE HYDROCHLORIDE 4 ML: 2.5 INJECTION, SOLUTION EPIDURAL; INFILTRATION; INTRACAUDAL at 10:06

## 2023-05-08 RX ADMIN — Medication 2 ML: at 10:04

## 2023-05-08 RX ADMIN — METHYLPREDNISOLONE ACETATE 80 MG: 80 INJECTION, SUSPENSION INTRA-ARTICULAR; INTRALESIONAL; INTRAMUSCULAR; PARENTERAL; SOFT TISSUE at 10:06

## 2023-05-08 RX ADMIN — IOHEXOL 2 ML: 300 INJECTION, SOLUTION INTRAVENOUS at 10:05

## 2023-05-08 RX ADMIN — SODIUM CHLORIDE 2 ML: 9 INJECTION, SOLUTION INTRAMUSCULAR; INTRAVENOUS; SUBCUTANEOUS at 10:04
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
## 2023-05-08 NOTE — DISCHARGE INSTR - LAB
Do not apply heat to any area that is numb  If you have discomfort or soreness at the injection site, you may apply ice today, 20 minutes on and 20 minutes off  Tomorrow you may use ice or warm, moist heat  Do not apply ice or heat directly to the skin  If you experience severe shortness of breath, go to the Emergency Room  You may have numbness for several hours from the local anesthetic  Please use caution and common sense, especially with weight-bearing activities  You may have an increase or change in the discomfort for 36-48 hours after your treatment  Apply ice and continue with any pain medicine you have been prescribed  Do not do anything strenuous today  You may shower, but no tub baths or hot tubs today  You may resume your normal activities tomorrow, but do not “overdo it”  Resume normal activities slowly when you are feeling better  If you experience redness, drainage or swelling at the injection site, or if you develop a fever above 100 degrees, please call [facility] at [phone] or go to the Emergency Room  Continue to take all routine medicines prescribed by your primary care physician unless otherwise instructed by our staff  Most blood thinners should be started again according to your regularly scheduled dosing  If you have any questions, please give our office a call  As no general anesthesia was used in today's procedure, you should not experience any side effects related to anesthesia  If you have a problem specifically related to your procedure, please call our office at [phone]  Problems not related to your procedure should be directed to your primary care physician

## 2023-05-10 ENCOUNTER — TELEMEDICINE (OUTPATIENT)
Dept: FAMILY MEDICINE CLINIC | Facility: CLINIC | Age: 43
End: 2023-05-10

## 2023-05-10 DIAGNOSIS — J06.9 UPPER RESPIRATORY TRACT INFECTION, UNSPECIFIED TYPE: ICD-10-CM

## 2023-05-10 DIAGNOSIS — F43.10 PTSD (POST-TRAUMATIC STRESS DISORDER): Primary | ICD-10-CM

## 2023-05-10 DIAGNOSIS — F33.0 MILD EPISODE OF RECURRENT MAJOR DEPRESSIVE DISORDER (HCC): ICD-10-CM

## 2023-05-10 DIAGNOSIS — M79.7 FIBROMYALGIA: ICD-10-CM

## 2023-05-10 DIAGNOSIS — F33.2 SEVERE EPISODE OF RECURRENT MAJOR DEPRESSIVE DISORDER, WITHOUT PSYCHOTIC FEATURES (HCC): ICD-10-CM

## 2023-05-10 DIAGNOSIS — M54.50 LUMBAR BACK PAIN: ICD-10-CM

## 2023-05-10 RX ORDER — CYCLOBENZAPRINE HCL 10 MG
10 TABLET ORAL 3 TIMES DAILY PRN
Qty: 270 TABLET | Refills: 1 | Status: SHIPPED | OUTPATIENT
Start: 2023-05-10

## 2023-05-10 RX ORDER — BACLOFEN 10 MG/1
10 TABLET ORAL 3 TIMES DAILY PRN
Qty: 270 TABLET | Refills: 1 | Status: SHIPPED | OUTPATIENT
Start: 2023-05-10

## 2023-05-10 RX ORDER — ALBUTEROL SULFATE 90 UG/1
2 AEROSOL, METERED RESPIRATORY (INHALATION) EVERY 6 HOURS PRN
Qty: 18 G | Refills: 2 | Status: SHIPPED | OUTPATIENT
Start: 2023-05-10

## 2023-05-10 RX ORDER — MILNACIPRAN HYDROCHLORIDE 50 MG/1
50 TABLET, FILM COATED ORAL 2 TIMES DAILY
Qty: 180 TABLET | Refills: 1 | Status: SHIPPED | OUTPATIENT
Start: 2023-05-10

## 2023-05-10 RX ORDER — BUPROPION HYDROCHLORIDE 300 MG/1
300 TABLET ORAL EVERY MORNING
Qty: 90 TABLET | Refills: 1 | Status: SHIPPED | OUTPATIENT
Start: 2023-05-10

## 2023-05-10 NOTE — ASSESSMENT & PLAN NOTE
Chronic, stable  Currently mild PHQ9 score 8  Continue Savella 50mg BID and Wellbutrin 300mg qd  - We discussed the benefits of counseling/therapy support  - Offered referrals as appropriate  - Counseled regarding lifestyle changes, sleep hygiene, breathing exercises, utilizing social supports, and CBT/breathing phone applications  Handout was provided on the same
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
## 2023-05-15 ENCOUNTER — TELEPHONE (OUTPATIENT)
Dept: PAIN MEDICINE | Facility: CLINIC | Age: 43
End: 2023-05-15

## 2023-05-18 NOTE — TELEPHONE ENCOUNTER
Pt reports 70% improvement post inj   Pain level 3-4/10  Pt aware I will call next week for an update

## 2023-06-07 ENCOUNTER — TELEPHONE (OUTPATIENT)
Dept: PAIN MEDICINE | Facility: MEDICAL CENTER | Age: 43
End: 2023-06-07

## 2023-06-07 NOTE — TELEPHONE ENCOUNTER
Caller: patient    Doctor: Isreal Maloney    Reason for call: patient rescheduled, car broke down    Call back#:

## 2023-07-28 ENCOUNTER — VBI (OUTPATIENT)
Dept: ADMINISTRATIVE | Facility: OTHER | Age: 43
End: 2023-07-28

## 2023-08-01 ENCOUNTER — OFFICE VISIT (OUTPATIENT)
Dept: PAIN MEDICINE | Facility: CLINIC | Age: 43
End: 2023-08-01
Payer: COMMERCIAL

## 2023-08-01 VITALS
SYSTOLIC BLOOD PRESSURE: 111 MMHG | DIASTOLIC BLOOD PRESSURE: 85 MMHG | RESPIRATION RATE: 16 BRPM | HEIGHT: 61 IN | HEART RATE: 90 BPM | WEIGHT: 147 LBS | BODY MASS INDEX: 27.75 KG/M2

## 2023-08-01 DIAGNOSIS — G89.4 CHRONIC PAIN SYNDROME: Primary | ICD-10-CM

## 2023-08-01 DIAGNOSIS — G57.03 PIRIFORMIS SYNDROME OF BOTH SIDES: ICD-10-CM

## 2023-08-01 DIAGNOSIS — M54.16 LUMBAR RADICULOPATHY: ICD-10-CM

## 2023-08-01 DIAGNOSIS — M46.1 SACROILIITIS (HCC): ICD-10-CM

## 2023-08-01 PROCEDURE — 99214 OFFICE O/P EST MOD 30 MIN: CPT

## 2023-08-01 RX ORDER — METHYLPREDNISOLONE 4 MG/1
TABLET ORAL
Qty: 1 EACH | Refills: 0 | Status: SHIPPED | OUTPATIENT
Start: 2023-08-01
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
## 2023-08-07 ENCOUNTER — TELEPHONE (OUTPATIENT)
Dept: FAMILY MEDICINE CLINIC | Facility: CLINIC | Age: 43
End: 2023-08-07

## 2023-08-07 NOTE — TELEPHONE ENCOUNTER
Patient left the following message in [place]     "Hi, my name is Denita Gutierrez and I'm a patient of Doctor Taylor. My phone number is [phone]. On Saturday, my sister unexpectedly passed away and I have mental health issues and I'm struggling and I wanted to talk to [facility] about possibly taking something to help me through. She can call me or I know she's pregnant. So somebody's covering [phone].  Thank you."

## 2023-08-10 ENCOUNTER — OFFICE VISIT (OUTPATIENT)
Dept: FAMILY MEDICINE CLINIC | Facility: CLINIC | Age: 43
End: 2023-08-10
Payer: COMMERCIAL

## 2023-08-10 VITALS
OXYGEN SATURATION: 99 % | BODY MASS INDEX: 27.78 KG/M2 | TEMPERATURE: 97.3 F | HEART RATE: 95 BPM | DIASTOLIC BLOOD PRESSURE: 88 MMHG | HEIGHT: 61 IN | SYSTOLIC BLOOD PRESSURE: 130 MMHG

## 2023-08-10 DIAGNOSIS — F43.21 GRIEF: Primary | ICD-10-CM

## 2023-08-10 PROCEDURE — 99214 OFFICE O/P EST MOD 30 MIN: CPT | Performed by: NURSE PRACTITIONER

## 2023-08-10 RX ORDER — ALPRAZOLAM 0.25 MG/1
0.25 TABLET ORAL 2 TIMES DAILY PRN
Qty: 4 TABLET | Refills: 0 | Status: SHIPPED | OUTPATIENT
Start: 2023-08-10

## 2023-08-12 NOTE — PROGRESS NOTES
Name: Zac Mariano      : 1980      MRN: [id number]  Encounter Provider: REBECA Murillo  Encounter Date: 8/10/2023   Encounter department: [address] [place] [address]     1. Grief  -     ALPRAZolam (XANAX) 0.25 mg tablet; Take 1 tablet (0.25 mg total) by mouth 2 (two) times a day as needed for anxiety    Discussed with patient plan to treat with a short course of alprazolam to help her get through the upcoming viewing/ of her sister  Discussed with patient and her mother plan to search for a grief counseling group or plan that she may attend to help her deal with her tragic loss       Subjective      41 y. o.female presenting with her mother to discuss possible medication to help her deal with the traumatic loss of her sister in the past week. Patient reports that her sister was found dead in her bed and corner believes that it might have been a heart attack but no permanent diagnosis obtain. Patient has a history of addiction to benzodiazepines so she does not want to to use them extensively but feels she needs something to help her get through the next 2 to 3 days. Discussed with patient alternatives to dealing with anxiety to benzodiazepines but patient would prefer a very short course of alprazolam to help her at this time. Patient reports that her and her mother are looking for some kind of grief counseling or a grief support group that they can attend to help them deal with this tragic loss. Patient reports that she is difficult with family and friends asking her repeatedly if sister's death was really due to suicide which seems to upset her further. Review of Systems   Constitutional: Negative. Respiratory: Negative. Cardiovascular: Negative. Gastrointestinal: Negative. Musculoskeletal: Negative. Neurological: Negative. Psychiatric/Behavioral: Positive for dysphoric mood.  Negative for self-injury, sleep disturbance and suicidal ideas. The patient is nervous/anxious. Current Outpatient Medications on File Prior to Visit   Medication Sig   • albuterol (PROVENTIL HFA,VENTOLIN HFA) 90 mcg/act inhaler Inhale 2 puffs every 6 (six) hours as needed for wheezing or shortness of breath   • baclofen 10 mg tablet Take 1 tablet (10 mg total) by mouth 3 (three) times a day as needed for muscle spasms   • buPROPion (WELLBUTRIN XL) 300 mg 24 hr tablet Take 1 tablet (300 mg total) by mouth every morning   • cyclobenzaprine (FLEXERIL) 10 mg tablet Take 1 tablet (10 mg total) by mouth 3 (three) times a day as needed for muscle spasms   • hyoscyamine (ANASPAZ,LEVSIN) 0.125 MG tablet Take 1 tablet (0.125 mg total) by mouth every 4 (four) hours as needed for cramping   • ibuprofen (MOTRIN) 200 mg tablet Take by mouth every 6 (six) hours as needed for mild pain   • meclizine (ANTIVERT) 12.5 MG tablet Take 1 tablet (12.5 mg total) by mouth 3 (three) times a day as needed for dizziness   • Milnacipran HCl (Savella) 50 MG TABS Take 1 tablet by mouth 2 (two) times a day   • Restasis 0.05 % ophthalmic emulsion    • fluocinolone acetonide (DERMOTIC) 0.01 % otic oil USE FIVE DROPS TO EACH EAR TWO TIMES A DAY (Patient not taking: Reported on 8/10/2023)   • methylPREDNISolone 4 MG tablet therapy pack Use as directed on package (Patient not taking: Reported on 8/10/2023)   • [DISCONTINUED] conjugated estrogens (PREMARIN) 0.625 mg tablet Take by mouth       Objective     /88 (BP Location: Right arm, Patient Position: Sitting)   Pulse 95   Temp (!) 97.3 °F (36.3 °C)   Ht 5' 1" (1.549 m)   SpO2 99%   BMI 27.78 kg/m²     Physical Exam  Vitals reviewed. Constitutional:       General: She is not in acute distress. Appearance: She is well-developed and well-groomed. She is not ill-appearing. HENT:      Head: Normocephalic and atraumatic. Eyes:      General: Lids are normal.      Extraocular Movements: Extraocular movements intact. Conjunctiva/sclera: Conjunctivae normal.      Pupils: Pupils are equal, round, and reactive to light. Neck:      Trachea: Trachea and phonation normal.   Cardiovascular:      Rate and Rhythm: Normal rate. Pulmonary:      Effort: Pulmonary effort is normal.   Skin:     General: Skin is warm and dry. Neurological:      Mental Status: She is alert and oriented to person, place, and time. Psychiatric:         Mood and Affect: Mood is depressed. Affect is tearful. Speech: Speech normal.         Behavior: Behavior normal. Behavior is cooperative. Thought Content: Thought content normal. Thought content does not include homicidal or suicidal ideation. Thought content does not include homicidal or suicidal plan.        [address] [facility]

## 2023-09-13 ENCOUNTER — HOSPITAL ENCOUNTER (OUTPATIENT)
Dept: NEUROLOGY | Facility: CLINIC | Age: 43
Discharge: HOME | End: 2023-09-13
Payer: COMMERCIAL

## 2023-09-13 ENCOUNTER — TELEPHONE (OUTPATIENT)
Age: 43
End: 2023-09-13

## 2023-09-13 ENCOUNTER — TELEPHONE (OUTPATIENT)
Dept: FAMILY MEDICINE CLINIC | Facility: CLINIC | Age: 43
End: 2023-09-13

## 2023-09-13 DIAGNOSIS — G57.93 NEUROPATHY INVOLVING BOTH LOWER EXTREMITIES: Chronic | ICD-10-CM

## 2023-09-13 PROCEDURE — 95886 MUSC TEST DONE W/N TEST COMP: CPT | Performed by: PSYCHIATRY & NEUROLOGY

## 2023-09-13 PROCEDURE — 95910 NRV CNDJ TEST 7-8 STUDIES: CPT | Performed by: PSYCHIATRY & NEUROLOGY

## 2023-09-13 NOTE — TELEPHONE ENCOUNTER
Left a detailed message as per release of health information, advising pt the same. C/B # provided for any questions.

## 2023-09-13 NOTE — TELEPHONE ENCOUNTER
----- Message from [address] [place] [address] sent at 9/13/2023  1:01 PM EDT -----  Impression:  Normal study. These electrodiagnostic findings do not provide any evidence to support a generalized  neuropathy versus myopathy affecting the peripheral nervous system. In addition, there was no  evidence to support a focal neuropathy versus radiculopathy in either lower extremity.

## 2023-11-04 DIAGNOSIS — F33.2 SEVERE EPISODE OF RECURRENT MAJOR DEPRESSIVE DISORDER, WITHOUT PSYCHOTIC FEATURES (HCC): ICD-10-CM

## 2023-11-04 DIAGNOSIS — M54.50 LUMBAR BACK PAIN: ICD-10-CM

## 2023-11-06 RX ORDER — BUPROPION HYDROCHLORIDE 300 MG/1
300 TABLET ORAL EVERY MORNING
Qty: 90 TABLET | Refills: 1 | Status: SHIPPED | OUTPATIENT
Start: 2023-11-06

## 2023-11-06 RX ORDER — BACLOFEN 10 MG/1
10 TABLET ORAL 3 TIMES DAILY PRN
Qty: 270 TABLET | Refills: 1 | Status: SHIPPED | OUTPATIENT
Start: 2023-11-06

## 2023-12-04 ENCOUNTER — TELEPHONE (OUTPATIENT)
Dept: FAMILY MEDICINE CLINIC | Facility: CLINIC | Age: 43
End: 2023-12-04

## 2023-12-04 ENCOUNTER — NURSE TRIAGE (OUTPATIENT)
Age: 43
End: 2023-12-04

## 2023-12-04 NOTE — TELEPHONE ENCOUNTER
Reason for Disposition  • [1] MODERATE pain (e.g., interferes with normal activities) AND [2] present > 3 days    Answer Assessment - Initial Assessment Questions  1. MECHANISM: "How did the injury happen?"      Unknown, was coughing and does a lot of lifting , sneezed   2. ONSET: "When did the injury happen?" (e.g., minutes, hours, days ago)      3 weeks   3. LOCATION: "Where on the chest is the injury located?" "Where does it hurt?"      Right lower breast area  4. CHEST OR RIB PAIN SEVERITY: "How bad is the pain?"  (e.g., Scale 1-10; mild, moderate, or severe)     - MILD (1-3): doesn't interfere with normal activities      - MODERATE (4-7): interferes with normal activities or awakens from sleep     - SEVERE (8-10): excruciating pain, unable to do any normal activities        moderate  5.  BREATHING DIFFICULTY: "Are you having any difficulty breathing?" If Yes, ask: "How bad is it?"  (e.g., none, mild, moderate, severe)   Denies SOB  6: OTHER SYMPTOMS (e.g., cough, fever, rash)       Hurts to take a breath    Protocols used: Chest Injury - Bending, Lifting, or Twisting-ADULT-

## 2023-12-04 NOTE — TELEPHONE ENCOUNTER
Patient called asking for an appointment for today. PEP who spoke to her stated that the patient was extremely tearful and explained that she is experiencing excruciating pain under her R breast. Patient states that the pain began about a week ago and has gotten much worse over time    Patient is also having a very difficult time dealing with the grief of losing her sister. Patient does have a f/u appointment with you on 12/12 for grief, but she does not want to wait that long.  She feels she needs medication before that     Please advise

## 2023-12-04 NOTE — TELEPHONE ENCOUNTER
Please call for appointment as per out conversation     Warm transfer to clinical . Spoke with MA. Will check with PCP for appropriate scheduling .

## 2023-12-04 NOTE — TELEPHONE ENCOUNTER
Unfortunately no one has openings today - recommend she go to urgent care for breast pain.  I can see her later this week for grief, 30 min (or 2:30 appt where that's the last of the day)

## 2023-12-05 ENCOUNTER — APPOINTMENT (EMERGENCY)
Dept: RADIOLOGY | Facility: HOSPITAL | Age: 43
End: 2023-12-05
Payer: COMMERCIAL

## 2023-12-05 ENCOUNTER — HOSPITAL ENCOUNTER (EMERGENCY)
Facility: HOSPITAL | Age: 43
Discharge: HOME | End: 2023-12-05
Attending: EMERGENCY MEDICINE
Payer: COMMERCIAL

## 2023-12-05 VITALS
OXYGEN SATURATION: 99 % | HEART RATE: 89 BPM | TEMPERATURE: 97.9 F | SYSTOLIC BLOOD PRESSURE: 139 MMHG | DIASTOLIC BLOOD PRESSURE: 75 MMHG | RESPIRATION RATE: 18 BRPM

## 2023-12-05 DIAGNOSIS — M94.0 COSTOCHONDRITIS: ICD-10-CM

## 2023-12-05 DIAGNOSIS — R07.81 RIB PAIN ON RIGHT SIDE: Primary | ICD-10-CM

## 2023-12-05 LAB
ATRIAL RATE: 71 BPM
P AXIS: 77 DEGREES
PR INTERVAL: 124 MS
QRS AXIS: 73 DEGREES
QRSD INTERVAL: 78 MS
QT INTERVAL: 368 MS
QTC INTERVAL: 399 MS
T WAVE AXIS: 76 DEGREES
VENTRICULAR RATE: 71 BPM

## 2023-12-05 PROCEDURE — 99285 EMERGENCY DEPT VISIT HI MDM: CPT | Performed by: EMERGENCY MEDICINE

## 2023-12-05 PROCEDURE — 71045 X-RAY EXAM CHEST 1 VIEW: CPT

## 2023-12-05 PROCEDURE — 99283 EMERGENCY DEPT VISIT LOW MDM: CPT

## 2023-12-05 PROCEDURE — 71101 X-RAY EXAM UNILAT RIBS/CHEST: CPT

## 2023-12-05 PROCEDURE — 93005 ELECTROCARDIOGRAM TRACING: CPT

## 2023-12-05 RX ORDER — LIDOCAINE 50 MG/G
1 PATCH TOPICAL DAILY
Qty: 7 PATCH | Refills: 0 | Status: SHIPPED | OUTPATIENT
Start: 2023-12-05

## 2023-12-05 RX ORDER — LIDOCAINE 50 MG/G
1 PATCH TOPICAL ONCE
Status: DISCONTINUED | OUTPATIENT
Start: 2023-12-05 | End: 2023-12-05 | Stop reason: HOSPADM

## 2023-12-05 RX ORDER — IBUPROFEN 600 MG/1
600 TABLET ORAL ONCE
Status: COMPLETED | OUTPATIENT
Start: 2023-12-05 | End: 2023-12-05

## 2023-12-05 RX ORDER — ACETAMINOPHEN 325 MG/1
650 TABLET ORAL ONCE
Status: COMPLETED | OUTPATIENT
Start: 2023-12-05 | End: 2023-12-05

## 2023-12-05 RX ADMIN — LIDOCAINE 1 PATCH: 50 PATCH CUTANEOUS at 13:31

## 2023-12-05 RX ADMIN — ACETAMINOPHEN 650 MG: 325 TABLET, FILM COATED ORAL at 13:31

## 2023-12-05 RX ADMIN — IBUPROFEN 600 MG: 600 TABLET, FILM COATED ORAL at 13:31

## 2023-12-05 NOTE — DISCHARGE INSTRUCTIONS
Tylenol or Motrin as needed for pain. Lidocaine patch on for 12 hours, then remove for 12 hours. Can use Salon-pas lidocaine patches over the counter if prescription is not covered by insurance. Keep your follow up appointment with family doctor tomorrow. Please return to the emergency department with any new or worsening chest pain, shortness of breath, coughing up blood, vomiting, abdominal pain, or any new symptoms.
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
## 2023-12-06 ENCOUNTER — OFFICE VISIT (OUTPATIENT)
Dept: FAMILY MEDICINE CLINIC | Facility: CLINIC | Age: 43
End: 2023-12-06
Payer: COMMERCIAL

## 2023-12-06 VITALS
BODY MASS INDEX: 27.75 KG/M2 | HEIGHT: 61 IN | HEART RATE: 77 BPM | DIASTOLIC BLOOD PRESSURE: 88 MMHG | OXYGEN SATURATION: 99 % | WEIGHT: 147 LBS | SYSTOLIC BLOOD PRESSURE: 140 MMHG | TEMPERATURE: 98 F

## 2023-12-06 DIAGNOSIS — F33.2 SEVERE EPISODE OF RECURRENT MAJOR DEPRESSIVE DISORDER, WITHOUT PSYCHOTIC FEATURES (HCC): Primary | ICD-10-CM

## 2023-12-06 DIAGNOSIS — M54.16 LUMBAR RADICULOPATHY: ICD-10-CM

## 2023-12-06 PROCEDURE — 99214 OFFICE O/P EST MOD 30 MIN: CPT | Performed by: FAMILY MEDICINE

## 2023-12-06 NOTE — PROGRESS NOTES
Assessment/Plan:    Mild episode of recurrent major depressive disorder (HCC)  Exacerbated  Continue chronic medications and add Lamictal  Ramp to 100mg, may need to increase further based on symptom response   Discussed adverse effects including rare SJS rash        Diagnoses and all orders for this visit:    Severe episode of recurrent major depressive disorder, without psychotic features (HCC)  -     lamoTRIgine (LaMICtal) 25 mg tablet; Take 1 tablet (25 mg total) by mouth daily for 14 days, THEN 2 tablets (50 mg total) daily for 14 days, THEN 4 tablets (100 mg total) daily. Lumbar radiculopathy  -     methylPREDNISolone 4 MG tablet therapy pack; Use as directed on package  -     meloxicam (Mobic) 15 mg tablet; Take 1 tablet (15 mg total) by mouth daily          Subjective:      Patient ID: Lachelle Torres is a 41 y.o. female. HPI  Patient presents with increased depression, anxiety, and pain in the setting of grief. Loss of her sister - suspected to be heart attack in her sleep (43's year old). She is not currently in counseling. She is complaint with medications but her depression is getting much worse. Worsening back pain and fibro, neuropathy. The following portions of the patient's history were reviewed and updated as appropriate: allergies, current medications, past medical history, past social history, and problem list.    Review of Systems   Constitutional:  Negative for activity change, appetite change, fatigue, fever and unexpected weight change. HENT:  Negative for congestion and trouble swallowing. Eyes:  Negative for visual disturbance. Respiratory:  Negative for chest tightness and shortness of breath. Cardiovascular:  Negative for palpitations. Gastrointestinal:  Negative for diarrhea and nausea. Genitourinary:  Negative for difficulty urinating. Skin:  Negative for rash. Neurological:  Negative for weakness, light-headedness and headaches.    Psychiatric/Behavioral: Positive for decreased concentration, dysphoric mood and sleep disturbance. Negative for self-injury and suicidal ideas. The patient is nervous/anxious. All other systems reviewed and are negative. Objective:      /88   Pulse 77   Temp 98 °F (36.7 °C)   Ht 5' 1" (1.549 m)   Wt 66.7 kg (147 lb)   SpO2 99%   BMI 27.78 kg/m²          Physical Exam  Vitals and nursing note reviewed. Constitutional:       General: She is not in acute distress. Appearance: Normal appearance. She is well-developed. She is not ill-appearing or diaphoretic. HENT:      Head: Normocephalic and atraumatic. Right Ear: External ear normal.      Left Ear: External ear normal.      Nose: Nose normal.   Eyes:      General: Lids are normal.      Conjunctiva/sclera: Conjunctivae normal.   Neck:      Vascular: No JVD. Trachea: No tracheal deviation. Pulmonary:      Effort: No accessory muscle usage or respiratory distress. Skin:     Findings: No rash. Neurological:      Mental Status: She is alert. Psychiatric:         Mood and Affect: Mood is depressed. Affect is tearful. Thought Content: Thought content does not include suicidal plan.

## 2023-12-07 PROBLEM — F33.2 SEVERE EPISODE OF RECURRENT MAJOR DEPRESSIVE DISORDER, WITHOUT PSYCHOTIC FEATURES (HCC): Status: ACTIVE | Noted: 2023-12-07

## 2023-12-07 RX ORDER — MELOXICAM 15 MG/1
15 TABLET ORAL DAILY
Qty: 30 TABLET | Refills: 1 | Status: SHIPPED | OUTPATIENT
Start: 2023-12-07

## 2023-12-07 RX ORDER — METHYLPREDNISOLONE 4 MG/1
TABLET ORAL
Qty: 21 EACH | Refills: 0 | Status: SHIPPED | OUTPATIENT
Start: 2023-12-07

## 2023-12-07 RX ORDER — LAMOTRIGINE 25 MG/1
TABLET ORAL
Qty: 162 TABLET | Refills: 0 | Status: SHIPPED | OUTPATIENT
Start: 2023-12-07 | End: 2024-02-03

## 2023-12-07 NOTE — ASSESSMENT & PLAN NOTE
Exacerbated  Continue chronic medications and add Lamictal  Ramp to 100mg, may need to increase further based on symptom response   Discussed adverse effects including rare SJS rash

## 2023-12-07 NOTE — TELEPHONE ENCOUNTER
Peyton Cook called was seen yesterday and says 3 meds were suppose to be sent to her pharmacy and when she called today they didn't have anything for her. Informed her meds haven't been sent yet and they are still pending:        ending      Disp Refills Start End   lamoTRIgine (LaMICtal) 25 mg tablet   12/6/2023    Sig - Route: Take 1 tablet (25 mg total) by mouth daily - Oral   Class: Normal   JAM: No   methylPREDNISolone 4 MG tablet therapy pack 21 each 0 12/6/2023    Sig: Use as directed on package   Class: Normal   meloxicam (Mobic) 15 mg tablet 30 tablet 1 12/6/2023    Sig - Route:  Take 1 tablet (15 mg total) by mouth daily - Oral   Class: Normal   JAM: No

## 2023-12-12 ENCOUNTER — PATIENT MESSAGE (OUTPATIENT)
Dept: FAMILY MEDICINE CLINIC | Facility: CLINIC | Age: 43
End: 2023-12-12

## 2023-12-12 DIAGNOSIS — M94.0 COSTOCHONDRITIS: ICD-10-CM

## 2023-12-12 DIAGNOSIS — R07.81 RIB PAIN ON RIGHT SIDE: ICD-10-CM

## 2023-12-14 RX ORDER — LIDOCAINE 50 MG/G
1 PATCH TOPICAL DAILY
Qty: 30 PATCH | Refills: 1 | Status: SHIPPED | OUTPATIENT
Start: 2023-12-14

## 2024-01-28 DIAGNOSIS — M79.7 FIBROMYALGIA: ICD-10-CM

## 2024-01-29 RX ORDER — MILNACIPRAN HYDROCHLORIDE 50 MG/1
50 TABLET, FILM COATED ORAL 2 TIMES DAILY
Qty: 180 TABLET | Refills: 0 | Status: SHIPPED | OUTPATIENT
Start: 2024-01-29

## 2024-02-27 DIAGNOSIS — F33.2 SEVERE EPISODE OF RECURRENT MAJOR DEPRESSIVE DISORDER, WITHOUT PSYCHOTIC FEATURES (HCC): ICD-10-CM

## 2024-02-28 DIAGNOSIS — F33.2 SEVERE EPISODE OF RECURRENT MAJOR DEPRESSIVE DISORDER, WITHOUT PSYCHOTIC FEATURES (HCC): ICD-10-CM

## 2024-02-28 RX ORDER — BUPROPION HYDROCHLORIDE 300 MG/1
300 TABLET ORAL EVERY MORNING
Qty: 90 TABLET | Refills: 0 | Status: SHIPPED | OUTPATIENT
Start: 2024-02-28

## 2024-02-28 RX ORDER — LAMOTRIGINE 200 MG/1
200 TABLET ORAL DAILY
Qty: 30 TABLET | Refills: 3 | Status: SHIPPED | OUTPATIENT
Start: 2024-02-28

## 2024-02-29 NOTE — TELEPHONE ENCOUNTER
Please call patient. Let her know that now that she's finished the ramp to increase Lamictal, the final dose increase will be up to 200mg. I sent that new tablet for her.   How is she feeling so far with her depression?

## 2024-03-07 DIAGNOSIS — F33.2 SEVERE EPISODE OF RECURRENT MAJOR DEPRESSIVE DISORDER, WITHOUT PSYCHOTIC FEATURES (HCC): ICD-10-CM

## 2024-03-07 RX ORDER — LAMOTRIGINE 200 MG/1
200 TABLET ORAL DAILY
Qty: 90 TABLET | Refills: 1 | Status: SHIPPED | OUTPATIENT
Start: 2024-03-07

## 2024-04-11 NOTE — TELEPHONE ENCOUNTER
Appointment made for [facility] Hello I have a STAT referral to Orthopedics. Would            be able to see this patient? Please advise.   DOI 4/10  DX:Closed avulsion fracture of medial malleolus of right tibia .  Thank you

## 2024-05-27 DIAGNOSIS — F33.2 SEVERE EPISODE OF RECURRENT MAJOR DEPRESSIVE DISORDER, WITHOUT PSYCHOTIC FEATURES (HCC): ICD-10-CM

## 2024-05-27 DIAGNOSIS — M54.50 LUMBAR BACK PAIN: ICD-10-CM

## 2024-05-28 RX ORDER — BUPROPION HYDROCHLORIDE 300 MG/1
300 TABLET ORAL EVERY MORNING
Qty: 90 TABLET | Refills: 1 | Status: SHIPPED | OUTPATIENT
Start: 2024-05-28

## 2024-05-29 RX ORDER — CYCLOBENZAPRINE HCL 10 MG
10 TABLET ORAL 3 TIMES DAILY PRN
Qty: 270 TABLET | Refills: 0 | Status: SHIPPED | OUTPATIENT
Start: 2024-05-29

## 2024-06-10 ENCOUNTER — OFFICE VISIT (OUTPATIENT)
Dept: FAMILY MEDICINE CLINIC | Facility: CLINIC | Age: 44
End: 2024-06-10
Payer: COMMERCIAL

## 2024-06-10 VITALS
TEMPERATURE: 97.7 F | DIASTOLIC BLOOD PRESSURE: 94 MMHG | SYSTOLIC BLOOD PRESSURE: 132 MMHG | OXYGEN SATURATION: 99 % | HEIGHT: 61 IN | WEIGHT: 143 LBS | BODY MASS INDEX: 27 KG/M2 | HEART RATE: 93 BPM

## 2024-06-10 DIAGNOSIS — R68.89 FLU-LIKE SYMPTOMS: ICD-10-CM

## 2024-06-10 DIAGNOSIS — U07.1 COVID: Primary | ICD-10-CM

## 2024-06-10 LAB
SARS-COV-2 AG UPPER RESP QL IA: POSITIVE
VALID CONTROL: ABNORMAL

## 2024-06-10 PROCEDURE — 99213 OFFICE O/P EST LOW 20 MIN: CPT | Performed by: FAMILY MEDICINE

## 2024-06-10 PROCEDURE — 87811 SARS-COV-2 COVID19 W/OPTIC: CPT | Performed by: FAMILY MEDICINE

## 2024-06-10 RX ORDER — NIRMATRELVIR AND RITONAVIR 300-100 MG
3 KIT ORAL 2 TIMES DAILY
Qty: 30 TABLET | Refills: 0 | Status: SHIPPED | OUTPATIENT
Start: 2024-06-10 | End: 2024-06-15

## 2024-06-10 NOTE — PROGRESS NOTES
"Ambulatory Visit  Name: Chaya Barrera      : 1980      MRN: [id number]  Encounter Provider: Marquita Lam MD  Encounter Date: 6/10/2024   Encounter department: [facility]    Assessment & Plan   1. COVID  -     nirmatrelvir & ritonavir (Paxlovid, 300/100,) tablet therapy pack; Take 3 tablets by mouth 2 (two) times a day for 5 days Take 2 nirmatrelvir tablets + 1 ritonavir tablet together per dose  2. Flu-like symptoms  -     POCT Rapid Covid Ag   Counseled the patient regarding supportive care.  They are to call or return to the office if not improving.    History of Present Illness     Patient started with sneezing Friday, then with GI symptoms overnight. Severe symptoms started Saturday.     Sinus Problem  Associated symptoms include congestion, coughing, headaches and sinus pressure. Pertinent negatives include no chills, shortness of breath or sore throat.       Review of Systems   Constitutional:  Positive for activity change, appetite change, fatigue and fever. Negative for chills.   HENT:  Positive for congestion and sinus pressure. Negative for rhinorrhea and sore throat.    Eyes:  Negative for visual disturbance.   Respiratory:  Positive for cough. Negative for shortness of breath and wheezing.    Cardiovascular:  Negative for chest pain and palpitations.   Gastrointestinal:  Negative for abdominal pain, blood in stool, constipation, diarrhea, nausea and vomiting.   Genitourinary:  Negative for dysuria.   Musculoskeletal:  Positive for myalgias. Negative for arthralgias.   Skin:  Negative for rash.   Neurological:  Positive for headaches. Negative for dizziness and weakness.   All other systems reviewed and are negative.    Medical History Reviewed by provider this encounter:       Objective     /94 (BP Location: Right arm, Patient Position: Sitting, Cuff Size: Adult)   Pulse 93   Temp 97.7 °F (36.5 °C)   Ht 5' 1\" (1.549 m)   Wt 64.9 kg (143 lb)   " SpO2 99%   BMI 27.02 kg/m²     Physical Exam  Vitals and nursing note reviewed.   Constitutional:       General: She is not in acute distress.     Appearance: She is well-developed. She is ill-appearing.   HENT:      Head: Normocephalic and atraumatic.      Right Ear: Tympanic membrane, ear canal and external ear normal.      Left Ear: Tympanic membrane, ear canal and external ear normal.      Nose: Nose normal.   Eyes:      Conjunctiva/sclera: Conjunctivae normal.   Neck:      Trachea: No tracheal deviation.   Cardiovascular:      Rate and Rhythm: Normal rate and regular rhythm.      Pulses: Normal pulses.      Heart sounds: Normal heart sounds. No murmur heard.  Pulmonary:      Effort: Pulmonary effort is normal.      Breath sounds: Rhonchi present. No wheezing or rales.   Abdominal:      Tenderness: There is no abdominal tenderness.   Skin:     General: Skin is warm and dry.      Capillary Refill: Capillary refill takes less than 2 seconds.      Findings: No rash.   Neurological:      Mental Status: She is alert.      Cranial Nerves: No cranial nerve deficit.       Administrative Statements

## 2024-06-14 ENCOUNTER — PATIENT MESSAGE (OUTPATIENT)
Dept: FAMILY MEDICINE CLINIC | Facility: CLINIC | Age: 44
End: 2024-06-14

## 2024-06-16 DIAGNOSIS — M79.7 FIBROMYALGIA: ICD-10-CM

## 2024-06-16 RX ORDER — MILNACIPRAN HYDROCHLORIDE 50 MG/1
50 TABLET, FILM COATED ORAL 2 TIMES DAILY
Qty: 180 TABLET | Refills: 1 | Status: SHIPPED | OUTPATIENT
Start: 2024-06-16

## 2024-08-13 ENCOUNTER — TELEPHONE (OUTPATIENT)
Dept: FAMILY MEDICINE CLINIC | Facility: CLINIC | Age: 44
End: 2024-08-13

## 2024-08-13 NOTE — TELEPHONE ENCOUNTER
Patient needs a prior auth for Savella. This was originally sent in June. Prior auth was closed because it could not be done electronically, but patient needs medication. Please call insurance and initiate auth

## 2024-08-15 ENCOUNTER — TELEPHONE (OUTPATIENT)
Dept: FAMILY MEDICINE CLINIC | Facility: CLINIC | Age: 44
End: 2024-08-15

## 2024-08-19 NOTE — TELEPHONE ENCOUNTER
PA for Milnacipran HCl (Savella) 50 MG TABS Denied    Reason:      Message sent to office clinical pool Yes    Denial letter scanned into Media Yes    Appeal started No     **Please follow up with your patient regarding denial and next steps**

## 2024-08-19 NOTE — TELEPHONE ENCOUNTER
Duplicate encounter created, please see telephone encounter from 8/13 regarding avel NORTON status. Please review patient's chart to see if there is already an encounter regarding the medication in question and to document anything regarding this medication in regards to anything regarding the authorization process etc before creating another encounter Thank You.

## 2024-08-19 NOTE — TELEPHONE ENCOUNTER
PA for Milnacipran HCL (savella) 50 mg SUBMITTED     via    []CMM-KEY:   [][employer] - how to arabia-Case ID #   [x]Faxed to [facility] [employer] [phone]  []Other website   []Phone call Case ID #     Office notes sent, clinical questions answered. Awaiting determination    Turnaround time for your insurance to make a decision on your Prior Authorization can take 7-21 business days.

## 2024-08-20 NOTE — TELEPHONE ENCOUNTER
Please submit this note to insurance:    Patient has a known documentation of fibromyalgia. She has been well controlled on Savella for several years. She has symptoms of brain fog, chronic muscle pain, trouble sleeping, fatigue, and exercise intolerance. Her symptoms have markedly improved while on the Savella.      I will do a [id number] if needed, just let me know.

## 2024-08-20 NOTE — TELEPHONE ENCOUNTER
PA for SAVELLA 50 MG appealed via     []CMM  []SS  [x]Letter sent to insurance via fax [employer] [phone]  []Other site or means     All necessary records sent. Will await response from insurance company    Turnaround time for a decision to be made on an appeal could take up to 30 business days

## 2024-08-21 NOTE — TELEPHONE ENCOUNTER
PA Appeal for Milnacipran HCl (Savella) 50 MG TABS Approved     Date(s) approved 6- - 8-      Patient advised by          [x] [employer] Message  [] Phone call   []LMOM  []L/M to call office as no active Communication consent on file  []Unable to leave detailed message as VM not approved on Communication consent       Pharmacy advised by    [x]Fax  []Phone call    Approval letter scanned into Media Yes

## 2024-08-27 DIAGNOSIS — M79.7 FIBROMYALGIA: ICD-10-CM

## 2024-08-27 DIAGNOSIS — F33.2 SEVERE EPISODE OF RECURRENT MAJOR DEPRESSIVE DISORDER, WITHOUT PSYCHOTIC FEATURES (HCC): ICD-10-CM

## 2024-08-28 RX ORDER — BUPROPION HYDROCHLORIDE 300 MG/1
300 TABLET ORAL EVERY MORNING
Qty: 90 TABLET | Refills: 1 | Status: SHIPPED | OUTPATIENT
Start: 2024-08-28

## 2024-08-28 RX ORDER — MILNACIPRAN HYDROCHLORIDE 50 MG/1
50 TABLET, FILM COATED ORAL 2 TIMES DAILY
Qty: 180 TABLET | Refills: 1 | Status: SHIPPED | OUTPATIENT
Start: 2024-08-28

## 2024-10-10 ENCOUNTER — TELEPHONE (OUTPATIENT)
Age: 44
End: 2024-10-10

## 2024-10-10 DIAGNOSIS — U07.1 COVID-19: Primary | ICD-10-CM

## 2024-10-10 RX ORDER — MOLNUPIRAVIR 200 MG/1
800 CAPSULE ORAL EVERY 12 HOURS
Qty: 40 CAPSULE | Refills: 0 | Status: SHIPPED | OUTPATIENT
Start: 2024-10-10 | End: 2024-10-15

## 2024-10-10 NOTE — TELEPHONE ENCOUNTER
Recommend 5 day quarantine, home care for covid symptoms. If she doesn't want Paxlovid she could have molnupirovir, another anti-viral.

## 2024-10-10 NOTE — TELEPHONE ENCOUNTER
Pt called with just an FYI that she has the same sxs as the kids, sore throat, head congestion, body aches, fatigue and headache. She has not Covid tested either. Pt is not interested in taking Paxlovid.

## 2024-10-10 NOTE — TELEPHONE ENCOUNTER
Spoke with patient. Son's covid test was positive. She does not want paxlovid  as it made her feel ill last time. Is there something else you can call in for symptoms?

## 2025-01-02 ENCOUNTER — PATIENT MESSAGE (OUTPATIENT)
Dept: FAMILY MEDICINE CLINIC | Facility: CLINIC | Age: 45
End: 2025-01-02

## 2025-01-29 ENCOUNTER — PATIENT MESSAGE (OUTPATIENT)
Dept: FAMILY MEDICINE CLINIC | Facility: CLINIC | Age: 45
End: 2025-01-29

## 2025-02-18 ENCOUNTER — PATIENT MESSAGE (OUTPATIENT)
Dept: FAMILY MEDICINE CLINIC | Facility: CLINIC | Age: 45
End: 2025-02-18

## 2025-02-19 DIAGNOSIS — F33.2 SEVERE EPISODE OF RECURRENT MAJOR DEPRESSIVE DISORDER, WITHOUT PSYCHOTIC FEATURES (HCC): ICD-10-CM

## 2025-02-19 DIAGNOSIS — M79.7 FIBROMYALGIA: ICD-10-CM

## 2025-02-21 RX ORDER — MILNACIPRAN HYDROCHLORIDE 50 MG/1
50 TABLET, FILM COATED ORAL 2 TIMES DAILY
Qty: 60 TABLET | Refills: 0 | Status: SHIPPED | OUTPATIENT
Start: 2025-02-21

## 2025-02-21 RX ORDER — BUPROPION HYDROCHLORIDE 300 MG/1
300 TABLET ORAL EVERY MORNING
Qty: 30 TABLET | Refills: 0 | Status: SHIPPED | OUTPATIENT
Start: 2025-02-21

## 2025-02-26 ENCOUNTER — OFFICE VISIT (OUTPATIENT)
Dept: FAMILY MEDICINE CLINIC | Facility: CLINIC | Age: 45
End: 2025-02-26
Payer: COMMERCIAL

## 2025-02-26 VITALS
WEIGHT: 156 LBS | SYSTOLIC BLOOD PRESSURE: 126 MMHG | BODY MASS INDEX: 29.45 KG/M2 | DIASTOLIC BLOOD PRESSURE: 80 MMHG | TEMPERATURE: 97.4 F | HEART RATE: 63 BPM | HEIGHT: 61 IN | OXYGEN SATURATION: 98 %

## 2025-02-26 DIAGNOSIS — Z00.00 ANNUAL PHYSICAL EXAM: Primary | ICD-10-CM

## 2025-02-26 DIAGNOSIS — S06.0X1A CONCUSSION WITH LOSS OF CONSCIOUSNESS OF 30 MINUTES OR LESS, INITIAL ENCOUNTER: ICD-10-CM

## 2025-02-26 DIAGNOSIS — N62 GYNECOMASTIA: ICD-10-CM

## 2025-02-26 DIAGNOSIS — J45.990 EXERCISE-INDUCED ASTHMA: ICD-10-CM

## 2025-02-26 DIAGNOSIS — Z12.31 ENCOUNTER FOR SCREENING MAMMOGRAM FOR BREAST CANCER: ICD-10-CM

## 2025-02-26 DIAGNOSIS — R63.5 WEIGHT GAIN: ICD-10-CM

## 2025-02-26 DIAGNOSIS — F33.2 SEVERE EPISODE OF RECURRENT MAJOR DEPRESSIVE DISORDER, WITHOUT PSYCHOTIC FEATURES (HCC): ICD-10-CM

## 2025-02-26 PROCEDURE — 99214 OFFICE O/P EST MOD 30 MIN: CPT | Performed by: FAMILY MEDICINE

## 2025-02-26 PROCEDURE — 99396 PREV VISIT EST AGE 40-64: CPT | Performed by: FAMILY MEDICINE

## 2025-02-26 NOTE — PATIENT INSTRUCTIONS
"Options for Private Nutritionist:  Angella Waterman  To schedule, please reach out to her at: [email or web address]     Promotion of Mental Health  Dr. Taylor recommends the following for the promotion of mental health:    Counseling/therapy may be of help to you  [facility]  [facility] counseling   If you are taking medication, you must take it as prescribed. Do not stop taking it or change doses without speaking to your doctor.  I encourage daily mindfulness habits including:  Go outside  Breathing exercises daily  Meditations or guided relaxation  Sleep hygiene (going to bed and waking up at the same time every day, even weekends)  Fueling your body and mind with water and nutritious food throughout the day  Let your friends and family know how you're feeling. Give them the opportunity to support you. Do not isolate yourself. Similarly, you may want to spend less time with people in your life who have bad habits you are trying to eliminate, or those who bring you down.  Be mindful of habits like smoking, drinking alcohol, and the use of other substances. I recommend a \"clean\" lifestyle to improve your mental health.  Use smart phone apps and "[employer]ube to find meditations and breathing exercises:  Free apps I like: Insight Timer, Woebot, Breethe, Breathe+, MyLife Meditation --> note some of these apps have free and paid sections, so you may not be able to access all features.  Paid apps I like: Headspace, Breathing Zone, [place], Calm  I am not sponsored by nor do I receive money from these apps, and they are not officially recommended by [facility]. I recommend them because I use them or my patients use them with success.   If you ever feel like you may hurt yourself or someone else, please call 9-1-1, text [phone], or go to the nearest emergency department    I also recommend signing up for My Chart if you haven't already, which is the [facility] kerry, so that you can send me messages to ask " "questions through the My Chart portal.     [email or web address]    National Suicide Prevention Hotline: 1-654.507.8644  If you or someone you know is suicidal or in emotional distress, contact the National Suicide Prevention Lifeline. Trained crisis workers are available to talk 24 hours a day, 7 days a week. Your confidential and toll-free call goes to the nearest crisis center in the Lifeline national network. These centers provide crisis counseling and mental health referrals. If you or someone you know is in immediate danger, please call 9-1-1.     [facility] Treatment Referral Helpline: 1-214.665.6373  Get general information on mental health and locate treatment services in your area. Speak to a live person, Monday through Friday from 8 a.m. to 8 p.m. EST.    Support Groups:  [facility] Behavioral Health Support Groups  Call intake to register: [phone]    [facility] Mental Illness:  [address] [place] [id number]  [phone]  [email or web address]  They provide multiple services including support groups for those with mental illness, as well as the family members of individuals with mental illness    Patient Education     Concussion in adults   The Basics   Written by the doctors and editors at [facility]   What is a concussion? -- A \"concussion\" is the medical term for a mild brain injury. It can cause confusion, memory loss, and headache.  A concussion usually happens after a person hits their head. But in some cases, it can happen after an injury or accident that causes violent shaking of the head.  Common causes of concussion include:   Car accidents   Falling down and other accidents that can happen from daily activities   Injuries from playing sports such as football, ice hockey, soccer, and boxing   Injuries that can happen to soldiers during combat. These include injuries from blasts and bullet wounds.  What are the symptoms of a concussion? -- People used to think " "that \"passing out\" or \"blacking out\" was an important feature of a concussion. But it is actually common to have a concussion without blacking out.  Symptoms that can happen immediately, or in the first minutes to hours after a concussion, include:   Memory loss - People sometimes forget what caused their injury, as well as what happened right before and after the injury.   Confusion   Headache   Dizziness or trouble with balance   Nausea or vomiting  Some people recover quickly from a concussion and have no further symptoms. But others have symptoms that persist or happen hours to days after a concussion. These might include:   Trouble walking or talking   Memory problems or problems paying attention   Trouble sleeping or feeling very tired or sleepy   Mood or behavior changes (for example, acting cranky, irritable, or not like themselves)   Being bothered by things like noise or light  Will I need tests? -- It depends on your injury and symptoms. To check if you have a concussion, your doctor will ask about your symptoms and do a physical exam. They will also ask you questions to check that you are thinking clearly.  If your doctor suspects a serious injury, they might order an imaging test of the brain, such as a CT or MRI scan. These tests create pictures of the skull and inside of the brain.  How is a concussion treated? -- If you think that you are having symptoms related to a head injury or concussion, see a doctor or nurse. This might be your regular doctor, or they might refer you to a different doctor.  Treatment of a concussion involves:   Preventing further injury - While you are healing, it's important not to do too much, especially activities that could lead to another head injury, like organized sports. Having a second injury while the brain is healing from a concussion can seriously damage the brain.   Physical rest - Rest your body, and get plenty of sleep. Avoid heavy exercise or too much physical " "activity if it makes you feel worse.   Mental rest - Doctors also call this \"cognitive rest.\" Avoid doing activities that need concentration or a lot of attention if they make you feel worse. Sometimes, activities using screens, especially videogames, can make people feel worse after a concussion. You can start doing these things again as you get better.   Avoiding alcohol and cannabis while you are still having symptoms of concussion   Treating headache - You can take an over-the-counter pain reliever if you have a headache. These include acetaminophen (sample brand name: Tylenol) and NSAIDs such as ibuprofen (sample brand names: Advil, Motrin) and naproxen (sample brand name: Aleve).  Most concussions get better on their own, but it can take time. For some people, the symptoms go away within minutes to hours. Other people have symptoms for weeks to months. When symptoms last a long time, doctors call it \"postconcussion syndrome.\"  When should I call for help? -- After a concussion, your doctor might recommend that someone stay with you for 12 to 24 hours. This person should watch for any new symptoms.  Someone should call for an ambulance (in the [place] and [place], call 9-1-1) if you:   Cannot be fully woken up   Are acting confused or disoriented   Have a sudden and persistent change in your behavior   Cannot walk normally   Have trouble speaking or slurred speech   Have severe weakness or cannot move an arm, leg, or 1 side of your face   Have a seizure or jerking of your arms or legs you cannot control  Someone should call the doctor or nurse for advice if you:   Have concussion symptoms that are not improving or are getting worse, even with physical and mental rest   Have blood or clear liquid draining from your ears or nose   Seem weak or have numbness in an arm, leg, or other body part   Have a stiff neck   Have a headache that is severe, gets worse, feels different, or does not get better with over-the-counter " medicines  If any of the above symptoms seem severe, or if you are concerned but cannot reach the doctor or nurse, seek emergency help. These things don't always mean that there is a serious problem, but seeing a doctor or nurse is the only way to know for sure.  When can I play sports or do my usual activities again? -- Ask your doctor when you can play sports or do your usual activities again. It will depend on your injury and symptoms, as well as the type of sport you play. Do not play sports on the same day as your injury.  When you are able to return to work also depends on your symptoms and the type of work you do.  It's important to let your brain heal completely after a concussion. Getting another concussion before your brain has healed can lead to serious brain problems.  How can I prevent another concussion? -- To help prevent another concussion, you can:   Wear a helmet when you ride a bike or motorcycle, or play certain sports.   Wear a seat belt when you drive or ride in a car.  If you have had a concussion, it's very important to try to prevent future concussions. Having many concussions might cause long-term brain damage and affect your thinking.  All topics are updated as new evidence becomes available and our peer review process is complete.  This topic retrieved from [employer] on: Feb 26, 2024.  Topic 76094 Version 11.0  Release: 32.2.4 - C32.56  © 2024 UpToDate, Inc. and/or its affiliates. All rights reserved.  Consumer Information Use and Disclaimer   Disclaimer: This generalized information is a limited summary of diagnosis, treatment, and/or medication information. It is not meant to be comprehensive and should be used as a tool to help the user understand and/or assess potential diagnostic and treatment options. It does NOT include all information about conditions, treatments, medications, side effects, or risks that may apply to a specific patient. It is not intended to be medical advice or a  "substitute for the medical advice, diagnosis, or treatment of a health care provider based on the health care provider's examination and assessment of a patient's specific and unique circumstances. Patients must speak with a health care provider for complete information about their health, medical questions, and treatment options, including any risks or benefits regarding use of medications. This information does not endorse any treatments or medications as safe, effective, or approved for treating a specific patient. UpToDate, Inc. and its affiliates disclaim any warranty or liability relating to this information or the use thereof.The use of this information is governed by the Terms of Use, available at https://www.Widgetbox.com/en/know/clinical-effectiveness-terms. 2024© UpToDate, Inc. and its affiliates and/or licensors. All rights reserved.  Copyright   © 2024 UpToDate, Inc. and/or its affiliates. All rights reserved.  Patient Education     Routine physical for adults   The Basics   Written by the doctors and editors at [employer]   What is a physical? -- A physical is a routine visit, or \"check-up,\" with your doctor. You might also hear it called a \"wellness visit\" or \"preventive visit.\"  During each visit, the doctor will:   Ask about your physical and mental health   Ask about your habits, behaviors, and lifestyle   Do an exam   Give you vaccines if needed   Talk to you about any medicines you take   Give advice about your health   Answer your questions  Getting regular check-ups is an important part of taking care of your health. It can help your doctor find and treat any problems you have. But it's also important for preventing health problems.  A routine physical is different from a \"sick visit.\" A sick visit is when you see a doctor because of a health concern or problem. Since physicals are scheduled ahead of time, you can think about what you want to ask the doctor.  How often should I get a " physical? -- It depends on your age and health. In general, for people age 21 years and older:   If you are younger than 50 years, you might be able to get a physical every 3 years.   If you are 50 years or older, your doctor might recommend a physical every year.  If you have an ongoing health condition, like diabetes or high blood pressure, your doctor will probably want to see you more often.  What happens during a physical? -- In general, each visit will include:   Physical exam - The doctor or nurse will check your height, weight, heart rate, and blood pressure. They will also look at your eyes and ears. They will ask about how you are feeling and whether you have any symptoms that bother you.   Medicines - It's a good idea to bring a list of all the medicines you take to each doctor visit. Your doctor will talk to you about your medicines and answer any questions. Tell them if you are having any side effects that bother you. You should also tell them if you are having trouble paying for any of your medicines.   Habits and behaviors - This includes:   Your diet   Your exercise habits   Whether you smoke, drink alcohol, or use drugs   Whether you are sexually active   Whether you feel safe at home  Your doctor will talk to you about things you can do to improve your health and lower your risk of health problems. They will also offer help and support. For example, if you want to quit smoking, they can give you advice and might prescribe medicines. If you want to improve your diet or get more physical activity, they can help you with this, too.   Lab tests, if needed - The tests you get will depend on your age and situation. For example, your doctor might want to check your:   Cholesterol   Blood sugar   Iron level   Vaccines - The recommended vaccines will depend on your age, health, and what vaccines you already had. Vaccines are very important because they can prevent certain serious or deadly infections.    "Discussion of screening - \"Screening\" means checking for diseases or other health problems before they cause symptoms. Your doctor can recommend screening based on your age, risk, and preferences. This might include tests to check for:   Cancer, such as breast, prostate, cervical, ovarian, colorectal, prostate, lung, or skin cancer   Sexually transmitted infections, such as chlamydia and gonorrhea   Mental health conditions like depression and anxiety  Your doctor will talk to you about the different types of screening tests. They can help you decide which screenings to have. They can also explain what the results might mean.   Answering questions - The physical is a good time to ask the doctor or nurse questions about your health. If needed, they can refer you to other doctors or specialists, too.  Adults older than 65 years often need other care, too. As you get older, your doctor will talk to you about:   How to prevent falling at home   Hearing or vision tests   Memory testing   How to take your medicines safely   Making sure that you have the help and support you need at home  All topics are updated as new evidence becomes available and our peer review process is complete.  This topic retrieved from [employer] on: May 02, 2024.  Topic 545753 Version 1.0  Release: 32.4.3 - C32.122  © 2024 UpToDate, Inc. and/or its affiliates. All rights reserved.  Consumer Information Use and Disclaimer   Disclaimer: This generalized information is a limited summary of diagnosis, treatment, and/or medication information. It is not meant to be comprehensive and should be used as a tool to help the user understand and/or assess potential diagnostic and treatment options. It does NOT include all information about conditions, treatments, medications, side effects, or risks that may apply to a specific patient. It is not intended to be medical advice or a substitute for the medical advice, diagnosis, or treatment of a health care " provider based on the health care provider's examination and assessment of a patient's specific and unique circumstances. Patients must speak with a health care provider for complete information about their health, medical questions, and treatment options, including any risks or benefits regarding use of medications. This information does not endorse any treatments or medications as safe, effective, or approved for treating a specific patient. UpToDate, Inc. and its affiliates disclaim any warranty or liability relating to this information or the use thereof.The use of this information is governed by the Terms of Use, available at https://www.EventSneaker.com/en/know/clinical-effectiveness-terms. 2024© UpToDate, Inc. and its affiliates and/or licensors. All rights reserved.  Copyright   © 2024 UpToDate, Inc. and/or its affiliates. All rights reserved.

## 2025-02-26 NOTE — ASSESSMENT & PLAN NOTE
Depression Screening Follow-up Plan: Patient's depression screening was positive with a PHQ-9 score of 5. Patient with underlying depression and was advised to continue current medications as prescribed.

## 2025-02-26 NOTE — PROGRESS NOTES
Adult Annual Physical  Name: Chaya Barrera      : 1980      MRN: [id number]  Encounter Provider: Marquita Lam MD  Encounter Date: 2025   Encounter department: [facility]    Assessment & Plan  Annual physical exam    Orders:  •  Lipid Panel with Direct LDL reflex; Future  •  CBC; Future  •  Comprehensive metabolic panel; Future  •  TSH, 3rd generation with Free T4 reflex; Future  •  Follicle stimulating hormone; Future  •  Luteinizing hormone; Future  •  Estrogens, total; Future    Concussion with loss of consciousness of 30 minutes or less, initial encounter  We discussed course of recovery in concussion can vary significantly from person to person. She experienced moderate concussion. Luckily no prior TBI Hx.   Recommend PT/neuro follow up   Orders:  •  Ambulatory referral to Neurology; Future  •  Ambulatory Referral to Physical Therapy; Future    Exercise-induced asthma         Gynecomastia  Obtain labs   Orders:  •  Follicle stimulating hormone; Future  •  Luteinizing hormone; Future  •  Estrogens, total; Future    Weight gain  Obtain labs, consider GLP-1.  Prior Authorization Clinical Questions for Weight Management Pharmacotherapy    1. Does the patient have a contrainidcation to medication prescribed for weight management?: No  2. Does the patient have a diagnosis of obesity, confirmed by a BMI greater than or equal to 30 kg/m^2?: No  3. Does the patient have a BMI of greater than or equal to 27 kg/m^2 with at least one weight-related comorbidity/risk factor/complication (e.g. diabetes, dyslipidemia, coronary artery disease)?: Yes  4. Weight-related co-morbidities/risk factors: dyslipidemia, depression  5. WEGOVY CVA Indication: Does patient have established documented cardiovascular disease (history of a prior heart attack (myocardial infarction), stroke, or symptomatic peripheral arterial disease (PAD)?: No  6. ZEPBOUND MIRANDA Indication: Does patient have  documented MIRANDA diagnosed via sleep study (insurance will require copy of sleep study results for approval)?: No  7. Has the patient been on a weight loss regimen of low-calorie diet, increased physical activity, and lifestyle modifications for a minimum of 6 months?: Yes  8. Has the patient completed a comprehensive weight loss program (ie, Weight Watchers, Noom, Bariatrics, other kerry on phone)? If so, what?: No  9. Does the patient have a history of type 2 diabetes?: No  10. Has the member tried and failed other weight loss medication within the past 12 months?: No  11. Will the member use requested medication in combination with another GLP agonist or weight loss drug?: No  12. Is the medication a controlled substance?: No     Baseline weight (in pounds): 156 lbs         Orders:  •  TSH, 3rd generation with Free T4 reflex; Future  •  Follicle stimulating hormone; Future  •  Luteinizing hormone; Future  •  Estrogens, total; Future    Encounter for screening mammogram for breast cancer    Orders:  •  Mammo screening bilateral w 3d and cad; Future    Severe episode of recurrent major depressive disorder, without psychotic features (HCC)    SW please assist with connecting patient to depression and grief support groups   Orders:  •  Ambulatory Referral to Social Work Care Management Program; Future    BMI 29.0-29.9,adult         Immunizations and preventive care screenings were discussed with patient today. Appropriate education was printed on patient's after visit summary.    Counseling:  Dental Health: discussed importance of regular tooth brushing, flossing, and dental visits.  Exercise: the importance of regular exercise/physical activity was discussed. Recommend exercise 3-5 times per week for at least 30 minutes.        History of Present Illness     Adult Annual Physical:  Patient presents for annual physical. Patient notes consistent post-concussive symptoms. Bothered by computer use, headaches, brain fog,.  "She had throbbing of the wound itself, but also a lot of pressure in the front of her forehead. Does not feel like sinus pressure. She notes some neck pressure. Denies nausea. Sometimes dizziness. 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     Patient has gained weight this year despite having consistent movement and nutrition. Mostly eats at home due to gluten allergies. She feels like her back pain has increased due to a lot of the weight gain located in her breasts.     Patient notes depression is worse due to spousal relationship. He gets mean when he drinks. Not in counseling. .     Diet and Physical Activity:  - Diet/Nutrition: well balanced diet and consuming 3-5 servings of fruits/vegetables daily.  - Exercise: walking and moderate cardiovascular exercise.    Depression Screening:    - PHQ-9 Score: 3    General Health:  - Sleep: sleeps well.  - Hearing: normal hearing bilateral ears.  - Vision: most recent eye exam > 1 year ago.  - Dental: regular dental visits.    /GYN Health:  - Follows with GYN: yes.   - Menopause: postmenopausal.     Review of Systems   Constitutional:  Negative for chills and fever.   HENT:  Negative for congestion and sore throat.    Eyes:  Negative for pain and visual disturbance.   Respiratory:  Negative for cough and shortness of breath.    Cardiovascular:  Negative for chest pain and palpitations.   Gastrointestinal:  Negative for abdominal pain and nausea.   Genitourinary:  Negative for dysuria.   Musculoskeletal:  Negative for arthralgias and myalgias.   Skin:  Negative for rash and wound.   Neurological:  Negative for dizziness and headaches.   All other systems reviewed and are negative.    Medical History Reviewed by provider this encounter:  Tobacco  Allergies  Meds  Problems  Med Hx  Surg Hx  Fam Hx     .    Objective   /80   Pulse 63   Temp (!) 97.4 °F (36.3 °C)   Ht 5' 1\" (1.549 m)   Wt 70.8 kg (156 lb)   " SpO2 98%   BMI 29.48 kg/m²     Physical Exam  Vitals and nursing note reviewed.   Constitutional:       General: She is not in acute distress.     Appearance: She is well-developed. She is not ill-appearing.   HENT:      Head: Normocephalic and atraumatic.      Right Ear: Tympanic membrane, ear canal and external ear normal. No middle ear effusion.      Left Ear: Tympanic membrane, ear canal and external ear normal.  No middle ear effusion.      Nose: Nose normal. No congestion or rhinorrhea.      Mouth/Throat:      Lips: Pink.      Mouth: Mucous membranes are moist.      Pharynx: Oropharynx is clear. Uvula midline. No oropharyngeal exudate.      Tonsils: No tonsillar exudate.   Eyes:      General: Lids are normal.      Extraocular Movements: Extraocular movements intact.      Conjunctiva/sclera: Conjunctivae normal.      Pupils: Pupils are equal, round, and reactive to light.   Neck:      Thyroid: No thyromegaly.      Trachea: No tracheal deviation.   Cardiovascular:      Rate and Rhythm: Normal rate and regular rhythm.      Pulses: Normal pulses.      Heart sounds: Normal heart sounds, S1 normal and S2 normal. No murmur heard.  Pulmonary:      Effort: Pulmonary effort is normal. No respiratory distress.      Breath sounds: Normal breath sounds. No decreased breath sounds, wheezing, rhonchi or rales.   Abdominal:      General: Bowel sounds are normal. There is no distension.      Palpations: Abdomen is soft.      Tenderness: There is no abdominal tenderness.   Musculoskeletal:      Right lower leg: No edema.      Left lower leg: No edema.   Lymphadenopathy:      Cervical: No cervical adenopathy.   Skin:     General: Skin is warm and dry.      Capillary Refill: Capillary refill takes less than 2 seconds.      Comments: Laceration well approximated.    Neurological:      Mental Status: She is alert and oriented to person, place, and time.      Deep Tendon Reflexes: Reflexes normal.      Reflex Scores:       Patellar  reflexes are 2+ on the right side and 2+ on the left side.  Psychiatric:         Attention and Perception: Attention normal.         Mood and Affect: Mood normal.         Thought Content: Thought content does not include suicidal ideation.

## 2025-02-26 NOTE — ASSESSMENT & PLAN NOTE
SW please assist with connecting patient to depression and grief support groups   Orders:  •  Ambulatory Referral to Social Work Care Management Program; Future

## 2025-02-27 ENCOUNTER — PATIENT OUTREACH (OUTPATIENT)
Dept: CASE MANAGEMENT | Facility: OTHER | Age: 45
End: 2025-02-27

## 2025-02-27 NOTE — PROGRESS NOTES
MONIKA SINCLAIR received referral to contact patient to offer support regarding mental health treatment.  Chart reviewed and call placed to patient to discuss and message left.  Will await return call and remain available to provide support.

## 2025-02-28 ENCOUNTER — APPOINTMENT (OUTPATIENT)
Dept: LAB | Facility: CLINIC | Age: 45
End: 2025-02-28
Payer: COMMERCIAL

## 2025-02-28 DIAGNOSIS — N62 GYNECOMASTIA: ICD-10-CM

## 2025-02-28 DIAGNOSIS — Z00.00 ANNUAL PHYSICAL EXAM: ICD-10-CM

## 2025-02-28 DIAGNOSIS — R63.5 WEIGHT GAIN: ICD-10-CM

## 2025-02-28 LAB
ALBUMIN SERPL BCG-MCNC: 4.7 G/DL (ref 3.5–5)
ALP SERPL-CCNC: 89 U/L (ref 34–104)
ALT SERPL W P-5'-P-CCNC: 35 U/L (ref 7–52)
ANION GAP SERPL CALCULATED.3IONS-SCNC: 7 MMOL/L (ref 4–13)
AST SERPL W P-5'-P-CCNC: 24 U/L (ref 13–39)
BILIRUB SERPL-MCNC: 0.47 MG/DL (ref 0.2–1)
BUN SERPL-MCNC: 10 MG/DL (ref 5–25)
CALCIUM SERPL-MCNC: 9.3 MG/DL (ref 8.4–10.2)
CHLORIDE SERPL-SCNC: 105 MMOL/L (ref 96–108)
CHOLEST SERPL-MCNC: 228 MG/DL (ref ?–200)
CO2 SERPL-SCNC: 28 MMOL/L (ref 21–32)
CREAT SERPL-MCNC: 0.87 MG/DL (ref 0.6–1.3)
ERYTHROCYTE [DISTWIDTH] IN BLOOD BY AUTOMATED COUNT: 12.6 % (ref 11.6–15.1)
FSH SERPL-ACNC: 73.5 MIU/ML
GFR SERPL CREATININE-BSD FRML MDRD: 82 ML/MIN/1.73SQ M
GLUCOSE P FAST SERPL-MCNC: 95 MG/DL (ref 65–99)
HCT VFR BLD AUTO: 42.4 % (ref 34.8–46.1)
HDLC SERPL-MCNC: 42 MG/DL
HGB BLD-MCNC: 13.9 G/DL (ref 11.5–15.4)
LDLC SERPL CALC-MCNC: 153 MG/DL (ref 0–100)
LH SERPL-ACNC: 44.5 MIU/ML
MCH RBC QN AUTO: 29.2 PG (ref 26.8–34.3)
MCHC RBC AUTO-ENTMCNC: 32.8 G/DL (ref 31.4–37.4)
MCV RBC AUTO: 89 FL (ref 82–98)
PLATELET # BLD AUTO: 353 THOUSANDS/UL (ref 149–390)
PMV BLD AUTO: 8.9 FL (ref 8.9–12.7)
POTASSIUM SERPL-SCNC: 4 MMOL/L (ref 3.5–5.3)
PROT SERPL-MCNC: 7.5 G/DL (ref 6.4–8.4)
RBC # BLD AUTO: 4.76 MILLION/UL (ref 3.81–5.12)
SODIUM SERPL-SCNC: 140 MMOL/L (ref 135–147)
TRIGL SERPL-MCNC: 166 MG/DL (ref ?–150)
TSH SERPL DL<=0.05 MIU/L-ACNC: 2.52 UIU/ML (ref 0.45–4.5)
WBC # BLD AUTO: 7.14 THOUSAND/UL (ref 4.31–10.16)

## 2025-02-28 PROCEDURE — 80061 LIPID PANEL: CPT

## 2025-02-28 PROCEDURE — 36415 COLL VENOUS BLD VENIPUNCTURE: CPT

## 2025-02-28 PROCEDURE — 84443 ASSAY THYROID STIM HORMONE: CPT

## 2025-02-28 PROCEDURE — 83002 ASSAY OF GONADOTROPIN (LH): CPT

## 2025-02-28 PROCEDURE — 82672 ASSAY OF ESTROGEN: CPT

## 2025-02-28 PROCEDURE — 83001 ASSAY OF GONADOTROPIN (FSH): CPT

## 2025-02-28 PROCEDURE — 85027 COMPLETE CBC AUTOMATED: CPT

## 2025-02-28 PROCEDURE — 80053 COMPREHEN METABOLIC PANEL: CPT

## 2025-03-04 ENCOUNTER — RESULTS FOLLOW-UP (OUTPATIENT)
Dept: FAMILY MEDICINE CLINIC | Facility: CLINIC | Age: 45
End: 2025-03-04

## 2025-03-04 LAB — ESTROGEN SERPL-MCNC: 33 PG/ML

## 2025-03-06 ENCOUNTER — PATIENT OUTREACH (OUTPATIENT)
Dept: CASE MANAGEMENT | Facility: OTHER | Age: 45
End: 2025-03-06

## 2025-03-06 DIAGNOSIS — E78.00 HYPERCHOLESTEREMIA: ICD-10-CM

## 2025-03-06 RX ORDER — TIRZEPATIDE 12.5 MG/.5ML
12.5 INJECTION, SOLUTION SUBCUTANEOUS WEEKLY
Qty: 2 ML | Refills: 0 | Status: SHIPPED | OUTPATIENT
Start: 2025-06-26 | End: 2025-07-24

## 2025-03-06 RX ORDER — TIRZEPATIDE 5 MG/.5ML
5 INJECTION, SOLUTION SUBCUTANEOUS WEEKLY
Qty: 2 ML | Refills: 0 | Status: SHIPPED | OUTPATIENT
Start: 2025-04-03 | End: 2025-05-01

## 2025-03-06 RX ORDER — TIRZEPATIDE 10 MG/.5ML
10 INJECTION, SOLUTION SUBCUTANEOUS WEEKLY
Qty: 2 ML | Refills: 0 | Status: SHIPPED | OUTPATIENT
Start: 2025-05-29 | End: 2025-06-26

## 2025-03-06 RX ORDER — TIRZEPATIDE 7.5 MG/.5ML
7.5 INJECTION, SOLUTION SUBCUTANEOUS WEEKLY
Qty: 2 ML | Refills: 0 | Status: SHIPPED | OUTPATIENT
Start: 2025-05-01 | End: 2025-05-29

## 2025-03-06 RX ORDER — TIRZEPATIDE 15 MG/.5ML
15 INJECTION, SOLUTION SUBCUTANEOUS WEEKLY
Qty: 6 ML | Refills: 0 | Status: SHIPPED | OUTPATIENT
Start: 2025-07-24

## 2025-03-06 RX ORDER — TIRZEPATIDE 2.5 MG/.5ML
2.5 INJECTION, SOLUTION SUBCUTANEOUS WEEKLY
Qty: 2 ML | Refills: 0 | Status: SHIPPED | OUTPATIENT
Start: 2025-03-06

## 2025-03-06 NOTE — PROGRESS NOTES
MONIKA SINCLAIR has not received return call from patient to date and second call placed to patient to offer support regarding outpatient mental health treatment. Second call placed to patient today and message left.  Unable to reach letter has been sent to patient today as well via My Chart.  Will close referral and remain available to provide support.

## 2025-03-06 NOTE — LETTER
[address] [facility] [place] [address]  [phone]    Re: Care Management   3/6/2025       Dear Chaya,    I was asked by your Medical Provider to contact you to offer you Psychosocial support.  I have tried to contact you on two occasions however I have been unable to reach you.  Please feel free to contact me at #[phone]. Thank you.     Sincerely,         Doretha Bee MSW

## 2025-03-07 ENCOUNTER — TELEPHONE (OUTPATIENT)
Age: 45
End: 2025-03-07

## 2025-03-07 NOTE — TELEPHONE ENCOUNTER
PA Zepbound 2.5 MG/0.5ML SUBMITTED     to [employer]     via    [x]CMM-KEY: [id number]  []Surescripts-Case ID #     []Availity-Auth ID #  NDC #    []Faxed to plan   []Other website    []Phone call Case ID #      []PA sent as URGENT    All office notes, labs and other pertaining documents and studies sent. Clinical questions answered. Awaiting determination from insurance company.     Turnaround time for your insurance to make a decision on your Prior Authorization can take 7-21 business days.

## 2025-03-09 DIAGNOSIS — M54.50 LUMBAR BACK PAIN: ICD-10-CM

## 2025-03-10 RX ORDER — CYCLOBENZAPRINE HCL 10 MG
10 TABLET ORAL 3 TIMES DAILY PRN
Qty: 270 TABLET | Refills: 0 | Status: SHIPPED | OUTPATIENT
Start: 2025-03-10

## 2025-03-11 NOTE — TELEPHONE ENCOUNTER
PA for ZEPBOUND 2.5MG DENIED    CALLED NUMBER PROVIDED [phone]- REP STATED THAT THERE WAS AN ERROR WITH THE FAX TRANSMISSION.    Reason: FOR DENIAL IS PATIENTS BMI BASE LINE MUST BE 40 OR GREATER. OTHERWISE- MEDICATION IS NOT COVERED        Message sent to office clinical pool Yes    Denial letter scanned into Media No WAITING FOR FAX    Appeal started No (Provider will need to decide if appeal is warranted and send clinical documentation to Prior Authorization Team for initiation.)    **Please follow up with your patient regarding denial and next steps**

## 2025-03-17 DIAGNOSIS — F33.2 SEVERE EPISODE OF RECURRENT MAJOR DEPRESSIVE DISORDER, WITHOUT PSYCHOTIC FEATURES (HCC): ICD-10-CM

## 2025-03-18 RX ORDER — BUPROPION HYDROCHLORIDE 300 MG/1
300 TABLET ORAL EVERY MORNING
Qty: 90 TABLET | Refills: 1 | Status: SHIPPED | OUTPATIENT
Start: 2025-03-18

## 2025-03-27 DIAGNOSIS — M79.7 FIBROMYALGIA: ICD-10-CM

## 2025-03-28 RX ORDER — MILNACIPRAN HYDROCHLORIDE 50 MG/1
50 TABLET, FILM COATED ORAL 2 TIMES DAILY
Qty: 180 TABLET | Refills: 1 | Status: SHIPPED | OUTPATIENT
Start: 2025-03-28

## 2025-04-18 ENCOUNTER — ESTABLISHED COMPREHENSIVE EXAM (OUTPATIENT)
Dept: URBAN - METROPOLITAN AREA CLINIC 6 | Facility: CLINIC | Age: 45
End: 2025-04-18

## 2025-04-18 DIAGNOSIS — H52.223: ICD-10-CM

## 2025-04-18 DIAGNOSIS — H52.13: ICD-10-CM

## 2025-04-18 PROCEDURE — S0621 ROUTINE OPHTHALMOLOGICAL EXA: HCPCS

## 2025-04-18 PROCEDURE — 92015 DETERMINE REFRACTIVE STATE: CPT

## 2025-04-18 ASSESSMENT — TONOMETRY
OD_IOP_MMHG: 14
OS_IOP_MMHG: 14

## 2025-04-18 ASSESSMENT — VISUAL ACUITY
OS_CC: 20/25+1
OD_CC: 20/20-1

## 2025-04-18 ASSESSMENT — KERATOMETRY
OD_K2POWER_DIOPTERS: 44.25
OS_K1POWER_DIOPTERS: 45.00
OS_K2POWER_DIOPTERS: 45.50
OD_AXISANGLE2_DEGREES: 147
OS_AXISANGLE2_DEGREES: 36
OD_K1POWER_DIOPTERS: 44.00
OS_AXISANGLE_DEGREES: 126
OD_AXISANGLE_DEGREES: 57

## 2025-04-30 ENCOUNTER — HOSPITAL ENCOUNTER (OUTPATIENT)
Facility: HOSPITAL | Age: 45
Discharge: HOME | End: 2025-04-30
Payer: COMMERCIAL

## 2025-04-30 VITALS — HEIGHT: 61 IN | WEIGHT: 156 LBS | BODY MASS INDEX: 29.45 KG/M2

## 2025-04-30 DIAGNOSIS — Z12.31 ENCOUNTER FOR SCREENING MAMMOGRAM FOR BREAST CANCER: ICD-10-CM

## 2025-04-30 PROCEDURE — 77067 SCR MAMMO BI INCL CAD: CPT

## 2025-04-30 PROCEDURE — 77063 BREAST TOMOSYNTHESIS BI: CPT

## 2025-05-05 ENCOUNTER — PATIENT MESSAGE (OUTPATIENT)
Dept: FAMILY MEDICINE CLINIC | Facility: CLINIC | Age: 45
End: 2025-05-05

## 2025-05-06 RX ORDER — PHENTERMINE HYDROCHLORIDE 30 MG/1
30 CAPSULE ORAL EVERY MORNING
Qty: 30 CAPSULE | Refills: 0 | Status: SHIPPED | OUTPATIENT
Start: 2025-05-06

## 2025-05-30 RX ORDER — PHENTERMINE HYDROCHLORIDE 30 MG/1
30 CAPSULE ORAL EVERY MORNING
Qty: 30 CAPSULE | Refills: 0 | Status: SHIPPED | OUTPATIENT
Start: 2025-05-30

## 2025-06-04 ENCOUNTER — OFFICE VISIT (OUTPATIENT)
Dept: FAMILY MEDICINE CLINIC | Facility: CLINIC | Age: 45
End: 2025-06-04
Payer: COMMERCIAL

## 2025-06-04 VITALS
HEIGHT: 61 IN | SYSTOLIC BLOOD PRESSURE: 124 MMHG | TEMPERATURE: 97.8 F | BODY MASS INDEX: 29.83 KG/M2 | WEIGHT: 158 LBS | HEART RATE: 88 BPM | OXYGEN SATURATION: 98 % | RESPIRATION RATE: 16 BRPM | DIASTOLIC BLOOD PRESSURE: 86 MMHG

## 2025-06-04 DIAGNOSIS — F41.1 GENERALIZED ANXIETY DISORDER: ICD-10-CM

## 2025-06-04 DIAGNOSIS — F33.2 SEVERE EPISODE OF RECURRENT MAJOR DEPRESSIVE DISORDER, WITHOUT PSYCHOTIC FEATURES (HCC): Primary | ICD-10-CM

## 2025-06-04 PROCEDURE — 99214 OFFICE O/P EST MOD 30 MIN: CPT | Performed by: FAMILY MEDICINE

## 2025-06-04 RX ORDER — LAMOTRIGINE 25 MG/1
TABLET ORAL
Qty: 42 TABLET | Refills: 0 | Status: SHIPPED | OUTPATIENT
Start: 2025-06-04

## 2025-06-04 NOTE — PROGRESS NOTES
Name: Chaya Barrera      : 1980      MRN: [id number]  Encounter Provider: Marquita Lam MD  Encounter Date: 2025   Encounter department: [facility]  :  Assessment & Plan  Severe episode of recurrent major depressive disorder, without psychotic features (HCC)  Chronic, not at goal  Recommend starting counseling  Resume/ramp up Lamictal. Once on 100mg for 2 weeks, increase to 200mg - will need new Rx  - We discussed the benefits of counseling/therapy support  - Offered referrals as appropriate  - Counseled regarding lifestyle changes, sleep hygiene, breathing exercises, utilizing social supports, and CBT/breathing phone applications. Handout was provided on the same.  - ED precautions reviewed for suicidal ideation/plan  Orders:  •  lamoTRIgine (LaMICtal) 25 mg tablet; Take 1 tablet (25 mg total) by mouth daily for 14 days, THEN 2 tablets (50 mg total) daily for 14 days, THEN 4 tablets (100 mg total) daily.    BMI 29.0-29.9,adult  Chronic, not at goal  Start phentermine 30mg qd  If no progress in 30 days with that, would stop phentermine, and start topamax in conjunction with Wellbutrin that she's already on for piecemeal Contrave   If that fails to work or is not tolerated would refer to weight         Generalized anxiety disorder  Chronic not at goal  Recommend counseling  Continue current medication               History of Present Illness   Chief Complaint   Patient presents with   • Anxiety   • Depression   • Weight Check      HPI Patient presents for weight management. She is frustrated by the lack of weight loss. She is exercising 3x weekly and trying to eat optimal nutrition. She states it's really affecting her mental health due to poor self-image. She is not seeing a counselor right now.     PHQ-2/9 Depression Screening    Little interest or pleasure in doing things: 1 - several days  Feeling down, depressed, or hopeless: 2 - more than  half the days  Trouble falling or staying asleep, or sleeping too much: 2 - more than half the days  Feeling tired or having little energy: 1 - several days  Poor appetite or overeatin - several days  Feeling bad about yourself - or that you are a failure or have let yourself or your family down: 2 - more than half the days  Trouble concentrating on things, such as reading the newspaper or watching television: 1 - several days  Moving or speaking so slowly that other people could have noticed. Or the opposite - being so fidgety or restless that you have been moving around a lot more than usual: 1 - several days  Thoughts that you would be better off dead, or of hurting yourself in some way: 0 - not at all  PHQ-9 Score: 11  PHQ-9 Interpretation: Moderate depression       FREDI-7 Flowsheet Screening    Flowsheet Row Most Recent Value   Over the last two weeks, how often have you been bothered by the following problems?     Feeling nervous, anxious, or on edge 2   Not being able to stop or control worrying 2   Worrying too much about different things 2   Trouble relaxing  2   Being so restless that it's hard to sit still 2   Becoming easily annoyed or irritable  2   Feeling afraid as if something awful might happen 0   How difficult have these problems made it for you to do your work, take care of things at home, or get along with other people?  Somewhat difficult   FREDI Score  12           Review of Systems   Constitutional:  Negative for chills and fever.   HENT:  Negative for congestion and sore throat.    Eyes:  Negative for pain and visual disturbance.   Respiratory:  Negative for cough and shortness of breath.    Cardiovascular:  Negative for chest pain and palpitations.   Gastrointestinal:  Negative for abdominal pain and nausea.   Genitourinary:  Negative for dysuria.   Musculoskeletal:  Negative for arthralgias and myalgias.   Skin:  Negative for rash and wound.   Neurological:  Negative for dizziness and  "headaches.   Psychiatric/Behavioral:  Positive for dysphoric mood. The patient is nervous/anxious.    All other systems reviewed and are negative.      Objective   /86   Pulse 88   Temp 97.8 °F (36.6 °C)   Resp 16   Ht 5' 1\" (1.549 m)   Wt 71.7 kg (158 lb)   SpO2 98%   BMI 29.85 kg/m²      Physical Exam  Vitals and nursing note reviewed.   Constitutional:       General: She is not in acute distress.     Appearance: She is well-developed.   HENT:      Head: Normocephalic and atraumatic.      Right Ear: External ear normal.      Left Ear: External ear normal.      Nose: Nose normal.     Eyes:      Conjunctiva/sclera: Conjunctivae normal.     Neck:      Trachea: No tracheal deviation.   Pulmonary:      Effort: Pulmonary effort is normal.   Abdominal:      Tenderness: There is no abdominal tenderness.     Skin:     General: Skin is warm and dry.      Capillary Refill: Capillary refill takes less than 2 seconds.      Findings: No rash.     Neurological:      Mental Status: She is alert.      Cranial Nerves: No cranial nerve deficit.     Psychiatric:         Mood and Affect: Affect is tearful.       "

## 2025-06-04 NOTE — ASSESSMENT & PLAN NOTE
Chronic, not at goal  Recommend starting counseling  Resume/ramp up Lamictal. Once on 100mg for 2 weeks, increase to 200mg - will need new Rx  - We discussed the benefits of counseling/therapy support  - Offered referrals as appropriate  - Counseled regarding lifestyle changes, sleep hygiene, breathing exercises, utilizing social supports, and CBT/breathing phone applications. Handout was provided on the same.  - ED precautions reviewed for suicidal ideation/plan  Orders:  •  lamoTRIgine (LaMICtal) 25 mg tablet; Take 1 tablet (25 mg total) by mouth daily for 14 days, THEN 2 tablets (50 mg total) daily for 14 days, THEN 4 tablets (100 mg total) daily.

## 2025-06-04 NOTE — PATIENT INSTRUCTIONS
"Promotion of Mental Health  Dr. Taylor recommends the following for the promotion of mental health:    Counseling/therapy may be of help to you  Micki Burleson  [facility]  [facility]   If you are taking medication, you must take it as prescribed. Do not stop taking it or change doses without speaking to your doctor.  I encourage daily mindfulness habits including:  Go outside  Breathing exercises daily  Meditations or guided relaxation  Sleep hygiene (going to bed and waking up at the same time every day, even weekends)  Fueling your body and mind with water and nutritious food throughout the day  Let your friends and family know how you're feeling. Give them the opportunity to support you. Do not isolate yourself. Similarly, you may want to spend less time with people in your life who have bad habits you are trying to eliminate, or those who bring you down.  Be mindful of habits like smoking, drinking alcohol, and the use of other substances. I recommend a \"clean\" lifestyle to improve your mental health.  Use smart phone apps and YouCRE[employer]pointube to find meditations and breathing exercises:  Free apps I like: Insight Timer, Woebot, Breethe, Breathe+, MyLife Meditation --> note some of these apps have free and paid sections, so you may not be able to access all features.  Paid apps I like: Headspace, Breathing Zone, [place], Calm  I am not sponsored by nor do I receive money from these apps, and they are not officially recommended by [facility]. I recommend them because I use them or my patients use them with success.   If you ever feel like you may hurt yourself or someone else, please call 9-1-1, text [phone], or go to the nearest emergency department    I also recommend signing up for My Chart if you haven't already, which is the [employer] kerry, so that you can send me messages to ask questions through the My Chart portal.     [email or web address]    National Suicide " Prevention Hotline: 1-326.359.2968  If you or someone you know is suicidal or in emotional distress, contact the National Suicide Prevention Lifeline. Trained crisis workers are available to talk 24 hours a day, 7 days a week. Your confidential and toll-free call goes to the nearest crisis center in the Lifeline national network. These centers provide crisis counseling and mental health referrals. If you or someone you know is in immediate danger, please call 9-1-1.     [facility] Treatment Referral Helpline: 1-700.268.2151  Get general information on mental health and locate treatment services in your area. Speak to a live person, Monday through Friday from 8 a.m. to 8 p.m. EST.    Support Groups:  [facility] Behavioral Health Support Groups  Call intake to register: [phone]    [employer] on Mental Illness:  [address] [place] [address]  [phone]  http://www.markell-lv.org/  They provide multiple services including support groups for those with mental illness, as well as the family members of individuals with mental illness

## 2025-07-21 RX ORDER — PHENTERMINE HYDROCHLORIDE 30 MG/1
30 CAPSULE ORAL EVERY MORNING
Qty: 30 CAPSULE | Refills: 0 | Status: SHIPPED | OUTPATIENT
Start: 2025-07-21

## 2025-07-21 NOTE — TELEPHONE ENCOUNTER
1 [id number] ** 05/30/2025 05/30/2025 Phentermine Hcl (Capsule) 30.0 30 30 MG NA [facility] Commercial Insurance 0 / 0 [place]    1 [id number] ** 03/26/2025 03/26/2025 Phentermine Hcl (Capsule) 30.0 30 15 MG [facility] Commercial [facility] 0 / 0

## (undated) DEVICE — PLASTIC ADHESIVE BANDAGE: Brand: CURITY

## (undated) DEVICE — IV SET EXT SM BORE CARESITE 8IN

## (undated) DEVICE — CHLORAPREP APPLICATOR TINTED 10.5ML ONE-STEP

## (undated) DEVICE — RADIOLOGY STERILE LABELS: Brand: CENTURION

## (undated) DEVICE — SYRINGE EPI 8ML LUER SLIP LOSS OF RESISTANCE PLASTIC PERFIX

## (undated) DEVICE — SYRINGE 5ML LL

## (undated) DEVICE — TRAY PAIN SUPPORT

## (undated) DEVICE — GLOVE SRG BIOGEL 7.5

## (undated) DEVICE — TRAY EPIDURAL PERIFIX 20GA X 3.5IN TUOHY 8ML

## (undated) DEVICE — TOWEL SET X-RAY

## (undated) RX ORDER — NEOMYCIN SULFATE, POLYMYXIN B SULFATE AND DEXAMETHASONE 3.5; 10000; 1 MG/ML; [USP'U]/ML; MG/ML
1 SUSPENSION OPHTHALMIC
Start: 2022-04-01